# Patient Record
Sex: FEMALE | Race: BLACK OR AFRICAN AMERICAN | Employment: UNEMPLOYED | URBAN - METROPOLITAN AREA
[De-identification: names, ages, dates, MRNs, and addresses within clinical notes are randomized per-mention and may not be internally consistent; named-entity substitution may affect disease eponyms.]

---

## 2017-01-05 ENCOUNTER — TELEPHONE (OUTPATIENT)
Dept: PRIMARY CARE CLINIC | Age: 57
End: 2017-01-05

## 2017-02-03 ENCOUNTER — OFFICE VISIT (OUTPATIENT)
Dept: PRIMARY CARE CLINIC | Age: 57
End: 2017-02-03

## 2017-02-03 VITALS
DIASTOLIC BLOOD PRESSURE: 94 MMHG | OXYGEN SATURATION: 98 % | SYSTOLIC BLOOD PRESSURE: 156 MMHG | RESPIRATION RATE: 18 BRPM | HEART RATE: 78 BPM | TEMPERATURE: 98 F

## 2017-02-03 DIAGNOSIS — R04.0 EPISTAXIS: ICD-10-CM

## 2017-02-03 DIAGNOSIS — A04.8 H. PYLORI INFECTION: ICD-10-CM

## 2017-02-03 DIAGNOSIS — Z12.31 ENCOUNTER FOR SCREENING MAMMOGRAM FOR BREAST CANCER: ICD-10-CM

## 2017-02-03 DIAGNOSIS — I10 ESSENTIAL HYPERTENSION: Primary | ICD-10-CM

## 2017-02-03 LAB
A/G RATIO: 1.4 (ref 1.1–2.2)
ALBUMIN SERPL-MCNC: 4.6 G/DL (ref 3.4–5)
ALP BLD-CCNC: 86 U/L (ref 40–129)
ALT SERPL-CCNC: 10 U/L (ref 10–40)
ANION GAP SERPL CALCULATED.3IONS-SCNC: 12 MMOL/L (ref 3–16)
AST SERPL-CCNC: 13 U/L (ref 15–37)
BASOPHILS ABSOLUTE: 0.1 K/UL (ref 0–0.2)
BASOPHILS RELATIVE PERCENT: 2.4 %
BILIRUB SERPL-MCNC: 0.4 MG/DL (ref 0–1)
BUN BLDV-MCNC: 16 MG/DL (ref 7–20)
CALCIUM SERPL-MCNC: 9.6 MG/DL (ref 8.3–10.6)
CHLORIDE BLD-SCNC: 102 MMOL/L (ref 99–110)
CO2: 27 MMOL/L (ref 21–32)
CREAT SERPL-MCNC: 1 MG/DL (ref 0.6–1.1)
EOSINOPHILS ABSOLUTE: 0.2 K/UL (ref 0–0.6)
EOSINOPHILS RELATIVE PERCENT: 3 %
GFR AFRICAN AMERICAN: >60
GFR NON-AFRICAN AMERICAN: 58
GLOBULIN: 3.2 G/DL
GLUCOSE BLD-MCNC: 89 MG/DL (ref 70–99)
HCT VFR BLD CALC: 41 % (ref 36–48)
HEMOGLOBIN: 13.3 G/DL (ref 12–16)
LYMPHOCYTES ABSOLUTE: 1.9 K/UL (ref 1–5.1)
LYMPHOCYTES RELATIVE PERCENT: 32.4 %
MCH RBC QN AUTO: 28.1 PG (ref 26–34)
MCHC RBC AUTO-ENTMCNC: 32.4 G/DL (ref 31–36)
MCV RBC AUTO: 86.7 FL (ref 80–100)
MONOCYTES ABSOLUTE: 0.5 K/UL (ref 0–1.3)
MONOCYTES RELATIVE PERCENT: 9.3 %
NEUTROPHILS ABSOLUTE: 3 K/UL (ref 1.7–7.7)
NEUTROPHILS RELATIVE PERCENT: 52.9 %
PDW BLD-RTO: 14.1 % (ref 12.4–15.4)
PLATELET # BLD: 259 K/UL (ref 135–450)
PMV BLD AUTO: 9.3 FL (ref 5–10.5)
POTASSIUM SERPL-SCNC: 5 MMOL/L (ref 3.5–5.1)
RBC # BLD: 4.73 M/UL (ref 4–5.2)
SODIUM BLD-SCNC: 141 MMOL/L (ref 136–145)
TOTAL PROTEIN: 7.8 G/DL (ref 6.4–8.2)
TSH REFLEX FT4: 1.87 UIU/ML (ref 0.27–4.2)
WBC # BLD: 5.7 K/UL (ref 4–11)

## 2017-02-03 PROCEDURE — 99214 OFFICE O/P EST MOD 30 MIN: CPT | Performed by: INTERNAL MEDICINE

## 2017-02-03 RX ORDER — AMOXICILLIN 500 MG/1
1000 CAPSULE ORAL 2 TIMES DAILY
Qty: 40 CAPSULE | Refills: 0 | Status: SHIPPED | OUTPATIENT
Start: 2017-02-03 | End: 2017-02-13

## 2017-02-03 RX ORDER — CLARITHROMYCIN 500 MG/1
500 TABLET, COATED ORAL 2 TIMES DAILY
Qty: 20 TABLET | Refills: 0 | Status: SHIPPED | OUTPATIENT
Start: 2017-02-03 | End: 2017-02-13

## 2017-02-03 RX ORDER — OLMESARTAN MEDOXOMIL / AMLODIPINE BESYLATE / HYDROCHLOROTHIAZIDE 40; 10; 12.5 MG/1; MG/1; MG/1
1 TABLET, FILM COATED ORAL DAILY
Qty: 90 TABLET | Refills: 1 | Status: SHIPPED | OUTPATIENT
Start: 2017-02-03 | End: 2017-05-26 | Stop reason: SDUPTHER

## 2017-02-04 ASSESSMENT — ENCOUNTER SYMPTOMS
ABDOMINAL PAIN: 0
EYES NEGATIVE: 1
RESPIRATORY NEGATIVE: 1
SHORTNESS OF BREATH: 0
RECTAL PAIN: 0
DIARRHEA: 0
ABDOMINAL DISTENTION: 0
CONSTIPATION: 0
VOMITING: 0
NAUSEA: 0

## 2017-03-02 ENCOUNTER — OFFICE VISIT (OUTPATIENT)
Dept: PRIMARY CARE CLINIC | Age: 57
End: 2017-03-02

## 2017-03-02 VITALS
HEART RATE: 76 BPM | WEIGHT: 177.8 LBS | DIASTOLIC BLOOD PRESSURE: 82 MMHG | BODY MASS INDEX: 28.7 KG/M2 | OXYGEN SATURATION: 99 % | SYSTOLIC BLOOD PRESSURE: 120 MMHG

## 2017-03-02 DIAGNOSIS — R04.0 EPISTAXIS: ICD-10-CM

## 2017-03-02 DIAGNOSIS — B35.1 NAIL FUNGUS: ICD-10-CM

## 2017-03-02 DIAGNOSIS — I10 ESSENTIAL HYPERTENSION: ICD-10-CM

## 2017-03-02 DIAGNOSIS — K21.00 GASTROESOPHAGEAL REFLUX DISEASE WITH ESOPHAGITIS: Primary | ICD-10-CM

## 2017-03-02 PROCEDURE — 99213 OFFICE O/P EST LOW 20 MIN: CPT | Performed by: INTERNAL MEDICINE

## 2017-03-02 ASSESSMENT — ENCOUNTER SYMPTOMS
DIARRHEA: 0
ABDOMINAL PAIN: 0
RECTAL PAIN: 0
CONSTIPATION: 0
VOMITING: 0
RESPIRATORY NEGATIVE: 1
EYES NEGATIVE: 1
ABDOMINAL DISTENTION: 0
NAUSEA: 0
SHORTNESS OF BREATH: 0

## 2017-03-02 ASSESSMENT — PATIENT HEALTH QUESTIONNAIRE - PHQ9
1. LITTLE INTEREST OR PLEASURE IN DOING THINGS: 0
SUM OF ALL RESPONSES TO PHQ QUESTIONS 1-9: 0
SUM OF ALL RESPONSES TO PHQ9 QUESTIONS 1 & 2: 0
2. FEELING DOWN, DEPRESSED OR HOPELESS: 0

## 2017-03-17 ENCOUNTER — TELEPHONE (OUTPATIENT)
Dept: ENT CLINIC | Age: 57
End: 2017-03-17

## 2017-03-17 ENCOUNTER — OFFICE VISIT (OUTPATIENT)
Dept: PRIMARY CARE CLINIC | Age: 57
End: 2017-03-17

## 2017-03-17 ENCOUNTER — TELEPHONE (OUTPATIENT)
Dept: PRIMARY CARE CLINIC | Age: 57
End: 2017-03-17

## 2017-03-17 VITALS
HEIGHT: 66 IN | DIASTOLIC BLOOD PRESSURE: 79 MMHG | TEMPERATURE: 97.8 F | HEART RATE: 89 BPM | SYSTOLIC BLOOD PRESSURE: 125 MMHG | RESPIRATION RATE: 17 BRPM | WEIGHT: 180 LBS | BODY MASS INDEX: 28.93 KG/M2

## 2017-03-17 DIAGNOSIS — R04.0 BLEEDING NOSE: Primary | ICD-10-CM

## 2017-03-17 DIAGNOSIS — R51.9 SEVERE FRONTAL HEADACHES: ICD-10-CM

## 2017-03-17 PROCEDURE — 99213 OFFICE O/P EST LOW 20 MIN: CPT | Performed by: INTERNAL MEDICINE

## 2017-03-17 RX ORDER — AMOXICILLIN 500 MG/1
1 TABLET, FILM COATED ORAL 3 TIMES DAILY
Qty: 30 TABLET | Refills: 0 | Status: SHIPPED | OUTPATIENT
Start: 2017-03-17 | End: 2017-10-20

## 2017-03-17 ASSESSMENT — ENCOUNTER SYMPTOMS
ABDOMINAL PAIN: 0
CONSTIPATION: 0
EYES NEGATIVE: 1
NAUSEA: 0
RESPIRATORY NEGATIVE: 1
RECTAL PAIN: 0
DIARRHEA: 0
SHORTNESS OF BREATH: 0
VOMITING: 0
ABDOMINAL DISTENTION: 0

## 2017-03-17 ASSESSMENT — PATIENT HEALTH QUESTIONNAIRE - PHQ9
2. FEELING DOWN, DEPRESSED OR HOPELESS: 0
SUM OF ALL RESPONSES TO PHQ QUESTIONS 1-9: 0
1. LITTLE INTEREST OR PLEASURE IN DOING THINGS: 0
SUM OF ALL RESPONSES TO PHQ9 QUESTIONS 1 & 2: 0

## 2017-03-18 RX ORDER — ECHINACEA PURPUREA EXTRACT 125 MG
1 TABLET ORAL 4 TIMES DAILY
Qty: 1 BOTTLE | Refills: 3 | Status: SHIPPED | OUTPATIENT
Start: 2017-03-18 | End: 2017-10-20

## 2017-04-10 ENCOUNTER — TELEPHONE (OUTPATIENT)
Dept: PRIMARY CARE CLINIC | Age: 57
End: 2017-04-10

## 2017-05-16 ENCOUNTER — TELEPHONE (OUTPATIENT)
Dept: PRIMARY CARE CLINIC | Age: 57
End: 2017-05-16

## 2017-05-26 ENCOUNTER — OFFICE VISIT (OUTPATIENT)
Dept: PRIMARY CARE CLINIC | Age: 57
End: 2017-05-26

## 2017-05-26 VITALS
WEIGHT: 179 LBS | OXYGEN SATURATION: 96 % | HEART RATE: 85 BPM | SYSTOLIC BLOOD PRESSURE: 135 MMHG | RESPIRATION RATE: 18 BRPM | TEMPERATURE: 98.8 F | DIASTOLIC BLOOD PRESSURE: 82 MMHG | BODY MASS INDEX: 28.89 KG/M2

## 2017-05-26 DIAGNOSIS — Z12.11 COLON CANCER SCREENING: Primary | ICD-10-CM

## 2017-05-26 DIAGNOSIS — K27.9 PUD (PEPTIC ULCER DISEASE): ICD-10-CM

## 2017-05-26 DIAGNOSIS — Z11.59 NEED FOR HEPATITIS C SCREENING TEST: ICD-10-CM

## 2017-05-26 DIAGNOSIS — Z11.4 SCREENING FOR HIV (HUMAN IMMUNODEFICIENCY VIRUS): ICD-10-CM

## 2017-05-26 DIAGNOSIS — S33.5XXA LOW BACK SPRAIN, INITIAL ENCOUNTER: ICD-10-CM

## 2017-05-26 DIAGNOSIS — Z23 NEED FOR TDAP VACCINATION: ICD-10-CM

## 2017-05-26 DIAGNOSIS — E55.9 VITAMIN D DEFICIENCY: ICD-10-CM

## 2017-05-26 DIAGNOSIS — I10 ESSENTIAL HYPERTENSION: ICD-10-CM

## 2017-05-26 DIAGNOSIS — S80.01XD CONTUSION OF RIGHT KNEE, SUBSEQUENT ENCOUNTER: ICD-10-CM

## 2017-05-26 LAB
A/G RATIO: 1.4 (ref 1.1–2.2)
ALBUMIN SERPL-MCNC: 4.4 G/DL (ref 3.4–5)
ALP BLD-CCNC: 83 U/L (ref 40–129)
ALT SERPL-CCNC: 16 U/L (ref 10–40)
ANION GAP SERPL CALCULATED.3IONS-SCNC: 15 MMOL/L (ref 3–16)
AST SERPL-CCNC: 21 U/L (ref 15–37)
BASOPHILS ABSOLUTE: 0.1 K/UL (ref 0–0.2)
BASOPHILS RELATIVE PERCENT: 1.4 %
BILIRUB SERPL-MCNC: 0.5 MG/DL (ref 0–1)
BUN BLDV-MCNC: 15 MG/DL (ref 7–20)
CALCIUM SERPL-MCNC: 9.6 MG/DL (ref 8.3–10.6)
CHLORIDE BLD-SCNC: 102 MMOL/L (ref 99–110)
CO2: 24 MMOL/L (ref 21–32)
CREAT SERPL-MCNC: 0.9 MG/DL (ref 0.6–1.1)
EOSINOPHILS ABSOLUTE: 0.1 K/UL (ref 0–0.6)
EOSINOPHILS RELATIVE PERCENT: 2.4 %
GFR AFRICAN AMERICAN: >60
GFR NON-AFRICAN AMERICAN: >60
GLOBULIN: 3.2 G/DL
GLUCOSE BLD-MCNC: 97 MG/DL (ref 70–99)
HCT VFR BLD CALC: 42 % (ref 36–48)
HEMOGLOBIN: 13.2 G/DL (ref 12–16)
LYMPHOCYTES ABSOLUTE: 1.4 K/UL (ref 1–5.1)
LYMPHOCYTES RELATIVE PERCENT: 29.9 %
MCH RBC QN AUTO: 27.8 PG (ref 26–34)
MCHC RBC AUTO-ENTMCNC: 31.4 G/DL (ref 31–36)
MCV RBC AUTO: 88.5 FL (ref 80–100)
MONOCYTES ABSOLUTE: 0.5 K/UL (ref 0–1.3)
MONOCYTES RELATIVE PERCENT: 9.8 %
NEUTROPHILS ABSOLUTE: 2.6 K/UL (ref 1.7–7.7)
NEUTROPHILS RELATIVE PERCENT: 56.5 %
PDW BLD-RTO: 14.8 % (ref 12.4–15.4)
PLATELET # BLD: 298 K/UL (ref 135–450)
PMV BLD AUTO: 8.4 FL (ref 5–10.5)
POTASSIUM SERPL-SCNC: 4.7 MMOL/L (ref 3.5–5.1)
RBC # BLD: 4.74 M/UL (ref 4–5.2)
SODIUM BLD-SCNC: 141 MMOL/L (ref 136–145)
TOTAL PROTEIN: 7.6 G/DL (ref 6.4–8.2)
TSH REFLEX FT4: 1.36 UIU/ML (ref 0.27–4.2)
VITAMIN D 25-HYDROXY: 14.5 NG/ML
WBC # BLD: 4.6 K/UL (ref 4–11)

## 2017-05-26 PROCEDURE — 99213 OFFICE O/P EST LOW 20 MIN: CPT | Performed by: INTERNAL MEDICINE

## 2017-05-26 RX ORDER — IBUPROFEN 800 MG/1
800 TABLET ORAL
COMMUNITY
Start: 2017-05-25 | End: 2017-10-20

## 2017-05-26 RX ORDER — DEXLANSOPRAZOLE 60 MG/1
60 CAPSULE, DELAYED RELEASE ORAL DAILY
Qty: 30 CAPSULE | Refills: 2 | Status: SHIPPED | OUTPATIENT
Start: 2017-05-26 | End: 2017-10-20 | Stop reason: SDUPTHER

## 2017-05-26 RX ORDER — TRAMADOL HYDROCHLORIDE 50 MG/1
50 TABLET ORAL
COMMUNITY
Start: 2017-05-25 | End: 2017-10-20

## 2017-05-26 RX ORDER — OLMESARTAN MEDOXOMIL / AMLODIPINE BESYLATE / HYDROCHLOROTHIAZIDE 40; 10; 12.5 MG/1; MG/1; MG/1
1 TABLET, FILM COATED ORAL DAILY
Qty: 90 TABLET | Refills: 1 | Status: SHIPPED | OUTPATIENT
Start: 2017-05-26 | End: 2017-10-20

## 2017-05-26 RX ORDER — NEBIVOLOL 10 MG/1
10 TABLET ORAL DAILY
Qty: 30 TABLET | Refills: 5 | Status: SHIPPED | OUTPATIENT
Start: 2017-05-26 | End: 2017-10-20 | Stop reason: SDUPTHER

## 2017-05-26 ASSESSMENT — ENCOUNTER SYMPTOMS
VOMITING: 0
BOWEL INCONTINENCE: 0
ABDOMINAL DISTENTION: 0
RECTAL PAIN: 0
DIARRHEA: 0
ABDOMINAL PAIN: 0
BACK PAIN: 1
VISUAL CHANGE: 0
RESPIRATORY NEGATIVE: 1
SHORTNESS OF BREATH: 0
NAUSEA: 0
EYES NEGATIVE: 1
CONSTIPATION: 0

## 2017-05-27 DIAGNOSIS — E55.9 VITAMIN D DEFICIENCY: Primary | ICD-10-CM

## 2017-05-27 LAB — HEPATITIS C ANTIBODY INTERPRETATION: NORMAL

## 2017-05-29 LAB — HIV-1 AND HIV-2 ANTIBODIES: NEGATIVE

## 2017-06-08 ENCOUNTER — TELEPHONE (OUTPATIENT)
Dept: PRIMARY CARE CLINIC | Age: 57
End: 2017-06-08

## 2017-06-29 ENCOUNTER — TELEPHONE (OUTPATIENT)
Dept: PRIMARY CARE CLINIC | Age: 57
End: 2017-06-29

## 2017-10-20 ENCOUNTER — OFFICE VISIT (OUTPATIENT)
Dept: PRIMARY CARE CLINIC | Age: 57
End: 2017-10-20

## 2017-10-20 VITALS
HEART RATE: 70 BPM | DIASTOLIC BLOOD PRESSURE: 78 MMHG | BODY MASS INDEX: 26.07 KG/M2 | OXYGEN SATURATION: 100 % | TEMPERATURE: 98 F | RESPIRATION RATE: 18 BRPM | WEIGHT: 172 LBS | SYSTOLIC BLOOD PRESSURE: 120 MMHG | HEIGHT: 68 IN

## 2017-10-20 DIAGNOSIS — I10 ESSENTIAL HYPERTENSION: ICD-10-CM

## 2017-10-20 DIAGNOSIS — K27.9 PUD (PEPTIC ULCER DISEASE): ICD-10-CM

## 2017-10-20 DIAGNOSIS — E55.9 VITAMIN D DEFICIENCY: ICD-10-CM

## 2017-10-20 DIAGNOSIS — K21.00 GASTROESOPHAGEAL REFLUX DISEASE WITH ESOPHAGITIS: Primary | ICD-10-CM

## 2017-10-20 PROCEDURE — 99213 OFFICE O/P EST LOW 20 MIN: CPT | Performed by: INTERNAL MEDICINE

## 2017-10-20 RX ORDER — OLMESARTAN MEDOXOMIL AND HYDROCHLOROTHIAZIDE 20/12.5 20; 12.5 MG/1; MG/1
1 TABLET ORAL DAILY
Qty: 30 TABLET | Refills: 3 | Status: SHIPPED | OUTPATIENT
Start: 2017-10-20 | End: 2018-06-06 | Stop reason: SDUPTHER

## 2017-10-20 RX ORDER — NEBIVOLOL 5 MG/1
5 TABLET ORAL DAILY
Qty: 30 TABLET | Refills: 5 | Status: SHIPPED | OUTPATIENT
Start: 2017-10-20 | End: 2018-06-06 | Stop reason: SDUPTHER

## 2017-10-20 RX ORDER — DEXLANSOPRAZOLE 60 MG/1
60 CAPSULE, DELAYED RELEASE ORAL DAILY
Qty: 30 CAPSULE | Refills: 5 | Status: SHIPPED | OUTPATIENT
Start: 2017-10-20 | End: 2018-08-24 | Stop reason: SDUPTHER

## 2017-10-20 RX ORDER — ONDANSETRON 4 MG/1
4 TABLET, FILM COATED ORAL
COMMUNITY
Start: 2017-10-11 | End: 2017-10-20

## 2017-10-20 NOTE — PROGRESS NOTES
Subjective:      Patient ID: Zoey Fink is a 56 y.o. female. HPI  Patient presents for evaluation of the followin. Gastroesophageal reflux disease with esophagitis  and     PUD (peptic ulcer disease) history. Had been out of Dexalant for over a month and symptoms of epigastric burning daily returned. No melena or hematocheizia. 3. Essential hypertension Bllod pressure is controlled but has not had medication for a few days. She has has some episodes of dizziness with standing. Will need to reduce dosage of medication. BP Readings from Last 3 Encounters:   10/20/17 120/78   10/10/17 137/87   17 135/82       No associated CHF or renal disease. No RANKIN or shortness of breath. 4. Vitamin D deficiency not taking supplement. Will restart and monitor level in three months. Current Outpatient Prescriptions on File Prior to Visit   Medication Sig Dispense Refill    Cholecalciferol (VITAMIN D3) 5000 UNITS TBDP Take 1 each by mouth daily 30 tablet 5    cetirizine (ZYRTEC) 10 MG tablet Take 1 tablet by mouth daily 30 tablet 2    Blood Pressure Monitoring (BLOOD PRESSURE CUFF) MISC by Does not apply route. 1 each 0     No current facility-administered medications on file prior to visit. Patient Active Problem List   Diagnosis    Nail fungus    Menopause syndrome    Essential hypertension    Pre-ulcerative corn or callous    Microscopic hematuria    Gastroesophageal reflux disease with esophagitis     No Known Allergies    Review of Systems   Constitutional: Negative. Negative for fever. HENT: Negative for nosebleeds. Patient reports use over-the-counter nasal spray and has not had any other nosebleeds. Eyes: Negative. Respiratory: Negative. Negative for shortness of breath. Cardiovascular: Negative. Negative for chest pain and palpitations. Hypertension.    Gastrointestinal: Negative for abdominal distention, abdominal pain, constipation, diarrhea, nausea, rectal pain and vomiting. GERD symptoms resolved on Dexilant. Genitourinary: Negative. Negative for dysuria and hematuria. Musculoskeletal: Positive for back pain. Negative for arthralgias, myalgias and neck pain. Skin: Negative. Neurological: Negative. Negative for numbness and headaches. Psychiatric/Behavioral: Negative. Objective:   Physical Exam   Constitutional: She is oriented to person, place, and time. She appears well-developed and well-nourished. No distress. HENT:   Head: Normocephalic and atraumatic. Right Ear: External ear normal.   Left Ear: External ear normal.   Nose: Nose normal.   Mouth/Throat: Oropharynx is clear and moist. No oropharyngeal exudate. Eyes: Conjunctivae and EOM are normal. Pupils are equal, round, and reactive to light. Right eye exhibits no discharge. Left eye exhibits no discharge. No scleral icterus. Neck: Normal range of motion. Neck supple. No JVD present. No tracheal deviation present. No thyromegaly present. Cardiovascular: Normal rate, regular rhythm, normal heart sounds and intact distal pulses. Pulmonary/Chest: Effort normal and breath sounds normal. No respiratory distress. She has no wheezes. She has no rales. She exhibits no tenderness. Breast exam without masses. Abdominal: Soft. Bowel sounds are normal. She exhibits no distension and no mass. There is no tenderness. There is no rebound and no guarding. Musculoskeletal: She exhibits no edema or tenderness. Mild swelling and crepitus of right knee. Lymphadenopathy:     She has no cervical adenopathy. Neurological: She is alert and oriented to person, place, and time. She has normal reflexes. No cranial nerve deficit. She exhibits normal muscle tone. Coordination normal.   Skin: Skin is warm and dry. No rash noted. She is not diaphoretic. No erythema. No pallor. Psychiatric: She has a normal mood and affect.  Her behavior is normal. Judgment Patient is taking over the counter meds and discussed as to how they interact with prescription medications.

## 2017-10-29 ASSESSMENT — ENCOUNTER SYMPTOMS
ABDOMINAL DISTENTION: 0
CONSTIPATION: 0
VOMITING: 0
BACK PAIN: 1
SHORTNESS OF BREATH: 0
ABDOMINAL PAIN: 0
NAUSEA: 0
EYES NEGATIVE: 1
DIARRHEA: 0
RESPIRATORY NEGATIVE: 1
RECTAL PAIN: 0

## 2017-11-03 ENCOUNTER — TELEPHONE (OUTPATIENT)
Dept: PRIMARY CARE CLINIC | Age: 57
End: 2017-11-03

## 2017-11-09 ENCOUNTER — OFFICE VISIT (OUTPATIENT)
Dept: PRIMARY CARE CLINIC | Age: 57
End: 2017-11-09

## 2017-11-09 VITALS
HEIGHT: 68 IN | DIASTOLIC BLOOD PRESSURE: 88 MMHG | TEMPERATURE: 98.2 F | WEIGHT: 171.6 LBS | HEART RATE: 77 BPM | OXYGEN SATURATION: 97 % | SYSTOLIC BLOOD PRESSURE: 136 MMHG | BODY MASS INDEX: 26.01 KG/M2

## 2017-11-09 DIAGNOSIS — M17.11 ARTHRITIS OF RIGHT KNEE: ICD-10-CM

## 2017-11-09 DIAGNOSIS — M54.9 MUSCULOSKELETAL BACK PAIN: Primary | ICD-10-CM

## 2017-11-09 PROCEDURE — 99213 OFFICE O/P EST LOW 20 MIN: CPT | Performed by: INTERNAL MEDICINE

## 2017-11-09 RX ORDER — BACLOFEN 10 MG/1
10 TABLET ORAL 2 TIMES DAILY
Qty: 60 TABLET | Refills: 5 | Status: SHIPPED | OUTPATIENT
Start: 2017-11-09 | End: 2017-11-20 | Stop reason: SDUPTHER

## 2017-11-09 NOTE — PROGRESS NOTES
Subjective:      Patient ID: Michelle Barajas is a 56 y.o. female. HPI  Patient presents for evaluation of followin. Musculoskeletal back pain bilateral lower back. No radiation into the legs. Complain of back muscle spasm spasm noted on exam.  Pain is a 5 out of 10 with flexion and extension standing for more than 15 minutes. No complaint of fecal incontinence but does have urinary urge incontinence intermittently. Pain was initially radiating to the leg but no longer. Pain is improved 50% and patient feels she is able to return to work on the 15 as a . States that she can. Patient has been going to physical therapy. External Referral To Physical Therapy    diclofenac (VOLTAREN) 50 MG EC tablet    External Referral To Orthopedic Surgery   2. Arthritis of right knee with swelling and pain with ambulation. Sharp shooting aching pain and relieved with sitting down and nonweightbearing activities. Arthritis pain for the past several months. External Referral To Orthopedic Surgery     Current Outpatient Prescriptions on File Prior to Visit   Medication Sig Dispense Refill    dexlansoprazole (DEXILANT) 60 MG CPDR delayed release capsule Take 1 capsule by mouth daily Discontinue omeprazole 30 capsule 5    nebivolol (BYSTOLIC) 5 MG tablet Take 1 tablet by mouth daily 30 tablet 5    olmesartan-hydrochlorothiazide (BENICAR HCT) 20-12.5 MG per tablet Take 1 tablet by mouth daily Stop amlodipine/olmesartan/hactz 30 tablet 3    Cholecalciferol (VITAMIN D3) 5000 UNITS TBDP Take 1 each by mouth daily 30 tablet 5    cetirizine (ZYRTEC) 10 MG tablet Take 1 tablet by mouth daily 30 tablet 2    Blood Pressure Monitoring (BLOOD PRESSURE CUFF) MISC by Does not apply route. 1 each 0     No current facility-administered medications on file prior to visit.       No Known Allergies  Patient Active Problem List   Diagnosis    Nail fungus    Menopause syndrome    Essential hypertension    Pre-ulcerative corn or callous    Microscopic hematuria    Gastroesophageal reflux disease with esophagitis       Review of Systems   Constitutional: Negative. Negative for fever. HENT: Negative for nosebleeds. Patient reports use over-the-counter nasal spray and has not had any other nosebleeds. Eyes: Negative. Respiratory: Negative. Negative for shortness of breath. Cardiovascular: Negative. Negative for chest pain and palpitations. Hypertension. Gastrointestinal: Negative for abdominal distention, abdominal pain, constipation, diarrhea, nausea, rectal pain and vomiting. GERD symptoms resolved on Dexilant. Genitourinary: Negative. Negative for dysuria and hematuria. Musculoskeletal: Positive for back pain. Negative for arthralgias, myalgias and neck pain. Skin: Negative. Neurological: Negative. Negative for numbness and headaches. Psychiatric/Behavioral: Negative. Objective:   Physical Exam   Constitutional: She is oriented to person, place, and time. She appears well-developed and well-nourished. No distress. HENT:   Head: Normocephalic and atraumatic. Right Ear: External ear normal.   Left Ear: External ear normal.   Nose: Nose normal.   Mouth/Throat: Oropharynx is clear and moist. No oropharyngeal exudate. Eyes: Conjunctivae and EOM are normal. Pupils are equal, round, and reactive to light. Right eye exhibits no discharge. Left eye exhibits no discharge. No scleral icterus. Neck: Normal range of motion. Neck supple. No JVD present. No tracheal deviation present. No thyromegaly present. Cardiovascular: Normal rate, regular rhythm, normal heart sounds and intact distal pulses. Pulmonary/Chest: Effort normal and breath sounds normal. No respiratory distress. She has no wheezes. She has no rales. She exhibits no tenderness. Breast exam without masses. Abdominal: Soft. Bowel sounds are normal. She exhibits no distension and no mass.  There is no

## 2017-11-09 NOTE — LETTER
[address]  [address]  Phone: [phone]  Fax: [phone]    Deena Herman MD        November 9, 2017     Patient: Autumn Schultz   YOB: 1960   Date of Visit: 11/9/2017       To Whom It May Concern: It is my medical opinion that Katharine Barrientos was brought to office today for a sick visit by her  Veena Maza. Mr. Veena Maza will be late to work today . [place] If you have any questions or concerns, please don't hesitate to call.     Sincerely,        Deena Herman MD

## 2017-11-10 ASSESSMENT — ENCOUNTER SYMPTOMS
RESPIRATORY NEGATIVE: 1
DIARRHEA: 0
NAUSEA: 0
BACK PAIN: 1
RECTAL PAIN: 0
ABDOMINAL DISTENTION: 0
EYES NEGATIVE: 1
VOMITING: 0
ABDOMINAL PAIN: 0
CONSTIPATION: 0
SHORTNESS OF BREATH: 0

## 2017-11-20 ENCOUNTER — OFFICE VISIT (OUTPATIENT)
Dept: PRIMARY CARE CLINIC | Age: 57
End: 2017-11-20

## 2017-11-20 VITALS
WEIGHT: 170 LBS | TEMPERATURE: 98 F | BODY MASS INDEX: 25.85 KG/M2 | SYSTOLIC BLOOD PRESSURE: 130 MMHG | RESPIRATION RATE: 18 BRPM | OXYGEN SATURATION: 97 % | DIASTOLIC BLOOD PRESSURE: 80 MMHG | HEART RATE: 78 BPM

## 2017-11-20 DIAGNOSIS — M54.9 MUSCULOSKELETAL BACK PAIN: ICD-10-CM

## 2017-11-20 DIAGNOSIS — M25.561 RIGHT KNEE PAIN, UNSPECIFIED CHRONICITY: Primary | ICD-10-CM

## 2017-11-20 PROCEDURE — 99213 OFFICE O/P EST LOW 20 MIN: CPT | Performed by: INTERNAL MEDICINE

## 2017-11-20 RX ORDER — BACLOFEN 10 MG/1
10 TABLET ORAL 3 TIMES DAILY
Qty: 90 TABLET | Refills: 5 | Status: SHIPPED | OUTPATIENT
Start: 2017-11-20 | End: 2018-03-12

## 2017-11-20 ASSESSMENT — ENCOUNTER SYMPTOMS
NAUSEA: 0
ABDOMINAL DISTENTION: 0
ABDOMINAL PAIN: 0
VOMITING: 0
RESPIRATORY NEGATIVE: 1
EYES NEGATIVE: 1
DIARRHEA: 0
RECTAL PAIN: 0
BACK PAIN: 1
CONSTIPATION: 0
SHORTNESS OF BREATH: 0

## 2017-11-27 ENCOUNTER — TELEPHONE (OUTPATIENT)
Dept: PRIMARY CARE CLINIC | Age: 57
End: 2017-11-27

## 2017-11-27 NOTE — TELEPHONE ENCOUNTER
Pt has an open referral to see the orthopedic surgeon, Dr. Nathaneil Phoenix but the patient would like a referral to see an orthopedic surgeon at [facility] for her R knee. Pt would like to get this completed ASAP. Please advise. Thanks!   Pt's cb#: [phone]

## 2017-12-06 ENCOUNTER — TELEPHONE (OUTPATIENT)
Dept: PRIMARY CARE CLINIC | Age: 57
End: 2017-12-06

## 2017-12-14 ENCOUNTER — OFFICE VISIT (OUTPATIENT)
Dept: ORTHOPEDIC SURGERY | Age: 57
End: 2017-12-14

## 2017-12-14 VITALS
TEMPERATURE: 98 F | WEIGHT: 170 LBS | BODY MASS INDEX: 25.76 KG/M2 | SYSTOLIC BLOOD PRESSURE: 124 MMHG | RESPIRATION RATE: 16 BRPM | HEIGHT: 68 IN | DIASTOLIC BLOOD PRESSURE: 76 MMHG | HEART RATE: 67 BPM

## 2017-12-14 DIAGNOSIS — G89.29 CHRONIC PAIN OF RIGHT KNEE: Primary | ICD-10-CM

## 2017-12-14 DIAGNOSIS — M25.561 CHRONIC PAIN OF RIGHT KNEE: Primary | ICD-10-CM

## 2017-12-14 DIAGNOSIS — M17.11 PRIMARY LOCALIZED OSTEOARTHROSIS OF RIGHT LOWER LEG: ICD-10-CM

## 2017-12-14 PROCEDURE — 99203 OFFICE O/P NEW LOW 30 MIN: CPT | Performed by: PHYSICIAN ASSISTANT

## 2017-12-14 PROCEDURE — 20610 DRAIN/INJ JOINT/BURSA W/O US: CPT | Performed by: PHYSICIAN ASSISTANT

## 2017-12-14 RX ORDER — NAPROXEN 500 MG/1
500 TABLET ORAL 2 TIMES DAILY WITH MEALS
Qty: 60 TABLET | Refills: 3 | Status: CANCELLED | OUTPATIENT
Start: 2017-12-14

## 2017-12-14 NOTE — PATIENT INSTRUCTIONS
Patient Education   Patient Education          cortisone  Pronunciation:  DOMENICA landis  Brand:  Cortone Acetate  What is the most important information I should know about cortisone? You should not use this medication if you are allergic to cortisone, or if you have a fungal infection anywhere in your body. Before taking cortisone, tell your doctor about all of your medical conditions, and about all other medicines you are using. There are many other diseases that can be affected by steroid use, and many other medicines that can interact with steroids. Your dosage needs may change if you have surgery, are ill, are under stress, or have a fever or infection. Do not change your medication dose or schedule without your doctor's advice. Tell your doctor about any illness or infection you have had within the past several weeks. Avoid being near people who are sick or have infections. Call your doctor for preventive treatment if you are exposed to chicken pox or measles. These conditions can be serious or even fatal in people who are using a steroid. Do not receive a \"live\" vaccine while using cortisone. The vaccine may not work as well during this time, and may not fully protect you from disease. Do not stop using cortisone suddenly, or you could have unpleasant withdrawal symptoms. Talk to your doctor about how to avoid withdrawal symptoms when stopping the medication. Wear a medical alert tag or carry an ID card stating that you take cortisone. Any medical care provider who treats you should know that you take steroid medication. What is cortisone? Cortisone is a steroid that prevents the release of substances in the body that cause inflammation. Cortisone is used to treat many different conditions such as allergic disorders, skin conditions, ulcerative colitis, arthritis, lupus, psoriasis, or breathing disorders. Cortisone may also be used for purposes not listed in this medication guide.   What should I or infection you have had within the past several weeks. This medication can cause unusual results with certain medical tests. Tell any doctor who treats you that you are using cortisone. Do not stop using cortisone suddenly, or you could have unpleasant withdrawal symptoms. Talk to your doctor about how to avoid withdrawal symptoms when stopping the medication. Wear a medical alert tag or carry an ID card stating that you take cortisone. Any medical care provider who treats you should know that you take steroid medication. Store at room temperature away from moisture, heat, and light. What happens if I miss a dose? Take the missed dose as soon as you remember. Skip the missed dose if it is almost time for your next scheduled dose. Do not take extra medicine to make up the missed dose. What happens if I overdose? Seek emergency medical attention or call the [facility] Help line at [phone]. An overdose of cortisone is not expected to produce life threatening symptoms. However, long term use of high steroid doses can lead to symptoms such as thinning skin, easy bruising, changes in the shape or location of body fat (especially in your face, neck, back, and waist), increased acne or facial hair, menstrual problems, impotence, or loss of interest in sex. What should I avoid while taking cortisone? Avoid being near people who are sick or have infections. Call your doctor for preventive treatment if you are exposed to chicken pox or measles. These conditions can be serious or even fatal in people who are using a steroid. Do not receive a \"live\" vaccine while using cortisone. The vaccine may not work as well during this time, and may not fully protect you from disease. Live vaccines include measles, mumps, rubella (MMR), Bacillus Calmette-Guérin (BCG), oral polio, rotavirus, smallpox, typhoid, yellow fever, varicella (chickenpox), H1N1 influenza, and nasal flu vaccine.   Avoid drinking alcohol while you are Rifamate, Rimactane); or  · seizure medications such as phenytoin (Dilantin) or phenobarbital (Luminal, Solfoton). This list is not complete and there are many other drugs that can interact with cortisone. Tell your doctor about all medications you use. This includes prescription, over-the-counter, vitamin, and herbal products. Do not start a new medication without telling your doctor. Keep a list of all your medicines and show it to any healthcare provider who treats you. Where can I get more information? Your pharmacist can provide more information about cortisone. Remember, keep this and all other medicines out of the reach of children, never share your medicines with others, and use this medication only for the indication prescribed. Every effort has been made to ensure that the information provided by [address] is accurate, up-to-date, and complete, but no guarantee is made to that effect. Drug information contained herein may be time sensitive. [facility] information has been compiled for use by healthcare practitioners and consumers in the [place] and therefore [facility] does not warrant that uses outside of the [place] are appropriate, unless specifically indicated otherwise. [facility]'s drug information does not endorse drugs, diagnose patients or recommend therapy. [facility] drug information is an informational resource designed to assist licensed healthcare practitioners in caring for their patients and/or to serve consumers viewing this service as a supplement to, and not a substitute for, the expertise, skill, knowledge and judgment of healthcare practitioners. The absence of a warning for a given drug or drug combination in no way should be construed to indicate that the drug or drug combination is safe, effective or appropriate for any given patient.  [facility] does not assume any responsibility for any aspect of healthcare administered with the aid of information [facility] provides. The information contained herein is not intended to cover all possible uses, directions, precautions, warnings, drug interactions, allergic reactions, or adverse effects. If you have questions about the drugs you are taking, check with your doctor, nurse or pharmacist.  Copyright 9830-8947 [address] [place] [address] Avenue: 7.01. Revision date: 6/7/2011. Care instructions adapted under license by [facility] ([facility]). If you have questions about a medical condition or this instruction, always ask your healthcare professional. [place] [address] any warranty or liability for your use of this information. Meniscus Tear: Care Instructions  Your Care Instructions    The meniscus is rubbery tissue in the knee that acts as a shock absorber between the upper and lower leg bones. The meniscus also keeps your knee stable by spreading weight across it. Each knee has two menisci (plural of meniscus). You can tear a meniscus if you plant your foot and twist, or pivot. The meniscus also can wear down as you age, and it can tear from squatting or kneeling. Small tears may heal on their own with rest and some physical therapy. But a more serious tear may need surgery to repair it or to remove part of the meniscus. Your doctor may want you to see a doctor who specializes in bones and sports injuries. Follow-up care is a key part of your treatment and safety. Be sure to make and go to all appointments, and call your doctor if you are having problems. It's also a good idea to know your test results and keep a list of the medicines you take. How can you care for yourself at home? · Rest your knee when possible. · Do not squat or kneel. · Take pain medicines exactly as directed. ¨ If the doctor gave you a prescription medicine for pain, take it as prescribed. ¨ If you are not taking a prescription pain medicine, ask your doctor if you can take an over-the-counter medicine.   · Put ice or a cold pack on your knee for 10 to 20 minutes at a time. Try to do this every 1 to 2 hours for the next 3 days (when you are awake) or until the swelling goes down. Put a thin cloth between the ice and your skin. · Prop up the sore leg on a pillow when you ice your knee or any time you sit or lie down during the next 3 days. Try to keep your leg above the level of your heart. This will help reduce swelling. · Follow your doctor's directions for using crutches or a knee brace, if suggested. · Follow your doctor's directions for exercises to keep your knee mobile and your leg muscles strong. Here are a few exercises you can try if your doctor says it is okay. ¨ Quad sets: Lie down on the floor or the bed with your injured leg straight. Fully extend your leg-there should be no or little bend in your knee. Tighten the thigh (quadriceps) of your injured leg for 6 seconds. Do not lift your heel up. Relax your quadriceps for 10 seconds. Repeat this exercise 8 to 12 times several times during the day. ¨ Straight-leg raises: Lie down on the floor or the bed with your injured leg flat and your uninjured leg bent so that the bottom of your foot is on the floor or bed. Tighten the quadriceps of your injured leg. Keeping your knee as straight as possible, lift your injured leg off the bed until it is about 18 inches above the bed or floor. Lower your leg back down and relax for 5 seconds. Do 3 sets of 20 repetitions, or if you tire quickly, 3 sets of 8 to 12 repetitions. ¨ Heel raises: Stand with your feet a few inches apart. Rest your hands lightly on a counter or chair in front of you. Slowly raise your heels off the floor while keeping your knees straight. Hold for 3 seconds, then slowly lower your heels to the floor. Do 3 sets of 8 to 12 repetitions. ¨ Heel slides: Lie down on the floor or the bed with your leg flat. Slowly begin to slide your heel toward your rear end (buttocks), keeping your heel on the floor.  Your knee will begin to bend. Slide your heel and bend your knee until it becomes a little sore and you can feel a small amount of pressure inside your knee. Hold this position for 10 seconds. Slide your heel back down until your leg is straight on the floor. Relax for 10 seconds. Repeat this exercise 20 times. When should you call for help? Watch closely for changes in your health, and be sure to contact your doctor if:  ? · You have increasing knee pain or swelling or both. ? · Your knee is so sore or stiff that you cannot walk on it. ? · You do not get better as expected. Where can you learn more? Go to https://[employer]lerte[employer].skedge.me. org and sign in to your [employer] account. Enter Q279 in the [facility] box to learn more about \"Meniscus Tear: Care Instructions. \"     If you do not have an account, please click on the \"Sign Up Now\" link. Current as of: March 21, 2017  Content Version: 11.4  © 7431-0819 [employer]. Care instructions adapted under license by [facility] ([facility]). If you have questions about a medical condition or this instruction, always ask your healthcare professional. [place] [address] any warranty or liability for your use of this information.

## 2017-12-14 NOTE — PROGRESS NOTES
This dictation was done with [employer] dictation and may contain mechanical errors related to translation. Subjective:  Shyam Bernstein is a 56 y.o. who is here complaining of pain in her right knee. She was referred here by Dr. Jose Clark. She works in [employer] up at [employer] and hasn't been able to work for at least a month, mainly due to her back and knee. Combination of pain, swelling, restriction of motion. She comes here for evaluation of her knees. I sent her for x-rays, including standing AP and lateral. [place] view      Patient Active Problem List   Diagnosis    Nail fungus    Menopause syndrome    Essential hypertension    Pre-ulcerative corn or callous    Microscopic hematuria    Gastroesophageal reflux disease with esophagitis           Current Outpatient Prescriptions on File Prior to Visit   Medication Sig Dispense Refill    diclofenac (VOLTAREN) 50 MG EC tablet Take 1 tablet by mouth Daily with supper 30 tablet 0    baclofen (LIORESAL) 10 MG tablet Take 1 tablet by mouth 3 times daily 90 tablet 5    dexlansoprazole (DEXILANT) 60 MG CPDR delayed release capsule Take 1 capsule by mouth daily Discontinue omeprazole 30 capsule 5    nebivolol (BYSTOLIC) 5 MG tablet Take 1 tablet by mouth daily 30 tablet 5    olmesartan-hydrochlorothiazide (BENICAR HCT) 20-12.5 MG per tablet Take 1 tablet by mouth daily Stop amlodipine/olmesartan/hactz 30 tablet 3    Cholecalciferol (VITAMIN D3) 5000 UNITS TBDP Take 1 each by mouth daily 30 tablet 5    cetirizine (ZYRTEC) 10 MG tablet Take 1 tablet by mouth daily 30 tablet 2    Blood Pressure Monitoring (BLOOD PRESSURE CUFF) MISC by Does not apply route. 1 each 0     No current facility-administered medications on file prior to visit. Objective:   Blood pressure 124/76, pulse 67, temperature 98 °F (36.7 °C), temperature source Temporal, resp. rate 16, height 5' 8\" (1.727 m), weight 170 lb (77.1 kg), not currently breastfeeding.     On examination, is a very pleasant 61-year-old female stress. She is alert and oriented ×3. She has a decreased valgus deformity on the right compared to the left at approximate 16° Q angle. She is a negative for Lockman negative for varus valgus laxity but she does have a mildly positive Edin with crepitus during flexion and extension through the patellofemoral joint. Her distal pulses are intact with good dorsiflexion and plantar flexion strength. She is negative for straight leg raise. Has some tightness to her hamstrings and piriformis muscle, but decent quad tone good hip flexion and abduction strength. Good range of motion of 0-140° bilaterally  Neuro exam grossly intact both lower extremities. Intact sensation to light touch. Motor exam 4+ to 5/5 in all major motor groups. Negative Bazan's sign. Skin is warm, dry and intact with out erythema or significant increased temperature around the knee joint(s). There are no cutaneous lesions or lymphadenopathy present. X-RAYS:  X-rays taken the office today show subchondral sclerosis and joint space narrowing osteoarthritis in all 3 compartments. There is no fractures or significant bone abnormalities are noted. There is a slight valgus alignment seen in this right knee      Assessment:  Right knee osteoarthritis/medial meniscus injury    Plan:  During today's visit, there was approximately 30 minutes of face-to-face discussion in regards to the patient's current condition and treatment options. More than 50 % of the time was counseling and coordination of care. We talked about short and long-term options for treatment. She will need physical therapy. I do not think she is capable of working full retirement duties. Right now she states that there is no light duty available. Therefore, with her consent she was injected with cortisone.  I will get her into formal physical therapy and see her back in 2-3 weeks hoping to return her to work early next year      PROCEDURE NOTE:  After a discussion of the multiple options, they consented to a cortisone shot. 1 ml of 40mg/ml Kenalog and 2 ml's of 0.25%Marcaine were injected into the right knee joint space. The leg was slightly flexed and injected just lateral to the patella tendon to under the patella. This was done with sterile technique and they tolerated it well.           They will schedule a follow up in 6-8 weeks

## 2017-12-14 NOTE — LETTER
[facility]  [address]  [address] [facility] [address]  Phone: [phone]  Fax: [phone]    Theron Meier MD        December 14, 2017     Patient: Autumn Schultz   YOB: 1960   Date of Visit: 12/14/2017       To Whom It May Concern: It is my medical opinion that Katharine Barrientos may return to work on [employer] 4th 2017. If you have any questions or concerns, please don't hesitate to call.     Sincerely,        Theron Meier MD

## 2018-01-09 ENCOUNTER — TELEPHONE (OUTPATIENT)
Dept: ORTHOPEDIC SURGERY | Age: 58
End: 2018-01-09

## 2018-01-10 ENCOUNTER — OFFICE VISIT (OUTPATIENT)
Dept: PRIMARY CARE CLINIC | Age: 58
End: 2018-01-10

## 2018-01-10 VITALS
BODY MASS INDEX: 26 KG/M2 | RESPIRATION RATE: 18 BRPM | OXYGEN SATURATION: 100 % | TEMPERATURE: 98.6 F | WEIGHT: 171 LBS | SYSTOLIC BLOOD PRESSURE: 109 MMHG | DIASTOLIC BLOOD PRESSURE: 69 MMHG | HEART RATE: 70 BPM

## 2018-01-10 DIAGNOSIS — M54.31 SCIATICA, RIGHT SIDE: ICD-10-CM

## 2018-01-10 DIAGNOSIS — M54.9 MUSCULOSKELETAL BACK PAIN: ICD-10-CM

## 2018-01-10 DIAGNOSIS — M17.11 PRIMARY OSTEOARTHRITIS OF RIGHT KNEE: Primary | ICD-10-CM

## 2018-01-10 DIAGNOSIS — S83.241S ACUTE MEDIAL MENISCUS TEAR, RIGHT, SEQUELA: ICD-10-CM

## 2018-01-10 PROCEDURE — 99213 OFFICE O/P EST LOW 20 MIN: CPT | Performed by: INTERNAL MEDICINE

## 2018-01-11 ENCOUNTER — OFFICE VISIT (OUTPATIENT)
Dept: ORTHOPEDIC SURGERY | Age: 58
End: 2018-01-11

## 2018-01-11 VITALS
DIASTOLIC BLOOD PRESSURE: 74 MMHG | WEIGHT: 170 LBS | SYSTOLIC BLOOD PRESSURE: 126 MMHG | BODY MASS INDEX: 25.76 KG/M2 | HEART RATE: 81 BPM | TEMPERATURE: 97.8 F | HEIGHT: 68 IN

## 2018-01-11 DIAGNOSIS — M17.11 PRIMARY LOCALIZED OSTEOARTHROSIS OF RIGHT LOWER LEG: Primary | ICD-10-CM

## 2018-01-11 DIAGNOSIS — S83.241A ACUTE MEDIAL MENISCAL TEAR, RIGHT, INITIAL ENCOUNTER: ICD-10-CM

## 2018-01-11 PROCEDURE — 99213 OFFICE O/P EST LOW 20 MIN: CPT | Performed by: PHYSICIAN ASSISTANT

## 2018-01-12 ENCOUNTER — TELEPHONE (OUTPATIENT)
Dept: ORTHOPEDIC SURGERY | Age: 58
End: 2018-01-12

## 2018-01-12 NOTE — TELEPHONE ENCOUNTER
Pt having MRI R knee on 1-16-18 at 11:00am at [facility].    Pt informed/order faxed/f/u appt made

## 2018-01-12 NOTE — PROGRESS NOTES
Subjective:      Patient ID: Karina Garcia is a 56 y.o.  female. Chief Complaint   Patient presents with    Knee Pain     Right knee        HPI: She is here for follow up on right knee(s). She states symptoms had improved 50% with the recent cortisone injection performed on 17. Pain is on average 6/10. Pain is worse with increased activity/ weight bearing. Pain improves with rest/ elevation. She is experiencing some mechanical symptoms of locking and catching. Pain is worse with squatting or twisting. Review of Systems:   Negative for fever or chills. Negative for significant numbness or tingling in lower extremity. Past Medical History:   Diagnosis Date    Depression 2011    Headache(784.0) 6-7 months    pain in forehead    HTN (hypertension) 10/10/2011    Musculoskeletal back pain 1/10/2018    Primary osteoarthritis of right knee 1/10/2018       Family History   Problem Relation Age of Onset    Cancer Father     Diabetes Mother     High Blood Pressure Mother     Stroke Mother        Past Surgical History:   Procedure Laterality Date     SECTION  1990    COLONOSCOPY      problems with duodenum    HERNIA REPAIR      TUBAL LIGATION      VENTRAL HERNIA REPAIR  2012    VENTRAL HERNIA REPAIR WITH MESH              Social History     Occupational History    Not on file.      Social History Main Topics    Smoking status: Former Smoker     Packs/day: 0.25     Years: 37.00     Types: Cigarettes     Quit date: 2012    Smokeless tobacco: Never Used      Comment: smoke when nerves get bad    Alcohol use 0.0 oz/week      Comment: 2x a week    Drug use: No    Sexual activity: Yes     Partners: Male       Current Outpatient Prescriptions   Medication Sig Dispense Refill    diclofenac (VOLTAREN) 50 MG EC tablet Take 1 tablet by mouth Daily with supper 30 tablet 0    baclofen (LIORESAL) 10 MG tablet Take 1 tablet by mouth 3 times daily 90 tablet

## 2018-01-16 ENCOUNTER — TELEPHONE (OUTPATIENT)
Dept: ORTHOPEDIC SURGERY | Age: 58
End: 2018-01-16

## 2018-01-17 ASSESSMENT — ENCOUNTER SYMPTOMS
VOMITING: 0
SHORTNESS OF BREATH: 0
DIARRHEA: 0
RESPIRATORY NEGATIVE: 1
BACK PAIN: 1
NAUSEA: 0
RECTAL PAIN: 0
ABDOMINAL PAIN: 0
EYES NEGATIVE: 1
CONSTIPATION: 0
ABDOMINAL DISTENTION: 0

## 2018-01-17 ASSESSMENT — PATIENT HEALTH QUESTIONNAIRE - PHQ9
2. FEELING DOWN, DEPRESSED OR HOPELESS: 1
SUM OF ALL RESPONSES TO PHQ QUESTIONS 1-9: 2
SUM OF ALL RESPONSES TO PHQ9 QUESTIONS 1 & 2: 2
1. LITTLE INTEREST OR PLEASURE IN DOING THINGS: 1

## 2018-01-17 NOTE — PROGRESS NOTES
cetirizine (ZYRTEC) 10 MG tablet Take 1 tablet by mouth daily 30 tablet 2    Blood Pressure Monitoring (BLOOD PRESSURE CUFF) MISC by Does not apply route. 1 each 0     No current facility-administered medications on file prior to visit. Patient Active Problem List   Diagnosis    Nail fungus    Menopause syndrome    Essential hypertension    Pre-ulcerative corn or callous    Microscopic hematuria    Gastroesophageal reflux disease with esophagitis    Acute medial meniscal tear, right, initial encounter    Primary osteoarthritis of right knee    Musculoskeletal back pain     No Known Allergies    Review of Systems   Constitutional: Negative. Negative for fever. HENT: Negative for nosebleeds. Patient reports use over-the-counter nasal spray and has not had any other nosebleeds. Eyes: Negative. Respiratory: Negative. Negative for shortness of breath. Cardiovascular: Negative. Negative for chest pain and palpitations. Hypertension. Gastrointestinal: Negative for abdominal distention, abdominal pain, constipation, diarrhea, nausea, rectal pain and vomiting. GERD symptoms resolved on Dexilant. Genitourinary: Negative. Negative for dysuria and hematuria. Musculoskeletal: Positive for back pain. Negative for arthralgias, myalgias and neck pain. Bilateral knee pain. Skin: Negative. Neurological: Negative. Negative for numbness and headaches. Psychiatric/Behavioral: Negative. Objective:   Physical Exam   Constitutional: She is oriented to person, place, and time. She appears well-developed and well-nourished. No distress. HENT:   Head: Normocephalic and atraumatic. Right Ear: External ear normal.   Left Ear: External ear normal.   Nose: Nose normal.   Mouth/Throat: Oropharynx is clear and moist. No oropharyngeal exudate. Eyes: Conjunctivae and EOM are normal. Pupils are equal, round, and reactive to light. Right eye exhibits no discharge.

## 2018-01-25 ENCOUNTER — TELEPHONE (OUTPATIENT)
Dept: PRIMARY CARE CLINIC | Age: 58
End: 2018-01-25

## 2018-01-25 NOTE — TELEPHONE ENCOUNTER
Patient was supposed to be having a MRI done of the right knee at [facility] in [facility] [address] they were trying to charge her 400 dollars out of pocket.  'Patient called to request a release  to go back to work because she cant afford the 400 out of pocket   Phone: [phone]

## 2018-02-01 ENCOUNTER — TELEPHONE (OUTPATIENT)
Dept: PRIMARY CARE CLINIC | Age: 58
End: 2018-02-01

## 2018-02-01 NOTE — TELEPHONE ENCOUNTER
Patient in the office wanting to have paperwork completed. Patient unable to pay copay. And wants to reschedule her appt, patient requesting a phone call regarding returning to work and knee pain.     Call back number: [phone]  Please advise

## 2018-02-21 ENCOUNTER — TELEPHONE (OUTPATIENT)
Dept: PRIMARY CARE CLINIC | Age: 58
End: 2018-02-21

## 2018-02-21 NOTE — TELEPHONE ENCOUNTER
Patient has a scheduled appointment on 03/09/18 with Dr. Lita Richter. Patient is requesting to see Dr. Lita Richter as soon as possible for FMLA, knee pain, go over MRI results, discuss returning to work. Please follow up with the patient.

## 2018-02-23 ENCOUNTER — TELEPHONE (OUTPATIENT)
Dept: PRIMARY CARE CLINIC | Age: 58
End: 2018-02-23

## 2018-02-23 ENCOUNTER — OFFICE VISIT (OUTPATIENT)
Dept: PRIMARY CARE CLINIC | Age: 58
End: 2018-02-23

## 2018-02-23 VITALS
SYSTOLIC BLOOD PRESSURE: 118 MMHG | RESPIRATION RATE: 14 BRPM | OXYGEN SATURATION: 93 % | HEART RATE: 85 BPM | HEIGHT: 66 IN | BODY MASS INDEX: 28.28 KG/M2 | WEIGHT: 176 LBS | TEMPERATURE: 97.9 F | DIASTOLIC BLOOD PRESSURE: 77 MMHG

## 2018-02-23 DIAGNOSIS — M51.36 LUMBAR DEGENERATIVE DISC DISEASE: ICD-10-CM

## 2018-02-23 DIAGNOSIS — Z02.89 ENCOUNTER FOR COMPLETION OF FORM WITH PATIENT: Primary | ICD-10-CM

## 2018-02-23 DIAGNOSIS — M17.11 PRIMARY OSTEOARTHRITIS OF RIGHT KNEE: ICD-10-CM

## 2018-02-23 PROBLEM — M51.369 LUMBAR DEGENERATIVE DISC DISEASE: Status: ACTIVE | Noted: 2018-02-23

## 2018-02-23 PROCEDURE — 99213 OFFICE O/P EST LOW 20 MIN: CPT | Performed by: INTERNAL MEDICINE

## 2018-02-23 ASSESSMENT — ENCOUNTER SYMPTOMS
RECTAL PAIN: 0
SHORTNESS OF BREATH: 0
ABDOMINAL PAIN: 0
CONSTIPATION: 0
BACK PAIN: 1
ABDOMINAL DISTENTION: 0
EYES NEGATIVE: 1
DIARRHEA: 0
NAUSEA: 0
VOMITING: 0
RESPIRATORY NEGATIVE: 1

## 2018-02-23 NOTE — LETTER
[facility]  [address]  [address]  Phone: [phone]  Fax: [phone]    Caryle Iron, MD        February 23, 2018     Patient: Jake Alvarez   YOB: 1960   Date of Visit: 2/23/2018       To [facility]: Thank you for evaluating Minh Michaud for back pain and sciatica. Please fax me copy of MRI report and consultation report. Please direct patient concerning her limitations for work. Orders for back therapy and braces will need to come from your office, because you have the most current information and would know the best treatment. If you have any questions or concerns, please don't hesitate to call.     Sincerely,        Caryle Iron, MD

## 2018-02-23 NOTE — PROGRESS NOTES
disease     3. Primary osteoarthritis of right knee           Plan:      Alban Plascencia received counseling on the following healthy behaviors: medication adherence    Patient given educational materials on after visit summary with diagnosis and treatment plan    I have instructed Alban Plascencia to complete a self tracking handout on Blood Pressures  and Weights and instructed them to bring it with them to her next appointment. Discussed use, benefit, and side effects of prescribed medications. Barriers to medication compliance addressed. All patient questions answered. Pt voiced understanding.

## 2018-03-06 ENCOUNTER — TELEPHONE (OUTPATIENT)
Dept: PRIMARY CARE CLINIC | Age: 58
End: 2018-03-06

## 2018-03-06 NOTE — TELEPHONE ENCOUNTER
NOTE:   Patient called me back and I told her Dr. Kimber Savage was not here today. She made an appt to come in tomorrow for the work excuse.

## 2018-03-12 ENCOUNTER — OFFICE VISIT (OUTPATIENT)
Dept: PRIMARY CARE CLINIC | Age: 58
End: 2018-03-12

## 2018-03-12 VITALS
WEIGHT: 176.2 LBS | TEMPERATURE: 97.3 F | HEART RATE: 72 BPM | DIASTOLIC BLOOD PRESSURE: 95 MMHG | BODY MASS INDEX: 28.32 KG/M2 | SYSTOLIC BLOOD PRESSURE: 180 MMHG | OXYGEN SATURATION: 100 % | HEIGHT: 66 IN

## 2018-03-12 DIAGNOSIS — M25.561 RIGHT KNEE PAIN, UNSPECIFIED CHRONICITY: ICD-10-CM

## 2018-03-12 DIAGNOSIS — Z02.89 ENCOUNTER FOR COMPLETION OF FORM WITH PATIENT: ICD-10-CM

## 2018-03-12 DIAGNOSIS — M48.061 NEURAL FORAMINAL STENOSIS OF LUMBAR SPINE: Primary | ICD-10-CM

## 2018-03-12 PROCEDURE — 99213 OFFICE O/P EST LOW 20 MIN: CPT | Performed by: INTERNAL MEDICINE

## 2018-03-12 RX ORDER — METHYLPREDNISOLONE 4 MG/1
TABLET ORAL
Qty: 1 KIT | Refills: 0 | Status: SHIPPED | OUTPATIENT
Start: 2018-03-12 | End: 2018-03-18

## 2018-03-12 RX ORDER — GABAPENTIN 300 MG/1
300 CAPSULE ORAL 3 TIMES DAILY
Qty: 90 CAPSULE | Refills: 3 | Status: SHIPPED | OUTPATIENT
Start: 2018-03-12 | End: 2018-05-14 | Stop reason: SDUPTHER

## 2018-03-12 ASSESSMENT — ENCOUNTER SYMPTOMS
ABDOMINAL DISTENTION: 0
RESPIRATORY NEGATIVE: 1
EYES NEGATIVE: 1
DIARRHEA: 0
NAUSEA: 0
VOMITING: 0
CONSTIPATION: 0
SHORTNESS OF BREATH: 0
BOWEL INCONTINENCE: 0
ABDOMINAL PAIN: 0
BACK PAIN: 1
RECTAL PAIN: 0

## 2018-04-17 ENCOUNTER — OFFICE VISIT (OUTPATIENT)
Dept: ORTHOPEDIC SURGERY | Age: 58
End: 2018-04-17

## 2018-04-17 VITALS
WEIGHT: 176.15 LBS | SYSTOLIC BLOOD PRESSURE: 139 MMHG | DIASTOLIC BLOOD PRESSURE: 89 MMHG | HEIGHT: 66 IN | HEART RATE: 80 BPM | BODY MASS INDEX: 28.31 KG/M2

## 2018-04-17 DIAGNOSIS — M51.36 DDD (DEGENERATIVE DISC DISEASE), LUMBAR: Primary | ICD-10-CM

## 2018-04-17 PROCEDURE — 99214 OFFICE O/P EST MOD 30 MIN: CPT | Performed by: PHYSICIAN ASSISTANT

## 2018-04-23 ENCOUNTER — TELEPHONE (OUTPATIENT)
Dept: ORTHOPEDIC SURGERY | Age: 58
End: 2018-04-23

## 2018-05-14 ENCOUNTER — OFFICE VISIT (OUTPATIENT)
Dept: PRIMARY CARE CLINIC | Age: 58
End: 2018-05-14

## 2018-05-14 VITALS
WEIGHT: 178 LBS | TEMPERATURE: 98 F | RESPIRATION RATE: 18 BRPM | OXYGEN SATURATION: 97 % | DIASTOLIC BLOOD PRESSURE: 80 MMHG | BODY MASS INDEX: 28.74 KG/M2 | HEART RATE: 79 BPM | SYSTOLIC BLOOD PRESSURE: 130 MMHG

## 2018-05-14 DIAGNOSIS — I10 ESSENTIAL HYPERTENSION: Primary | ICD-10-CM

## 2018-05-14 DIAGNOSIS — M51.36 LUMBAR DEGENERATIVE DISC DISEASE: ICD-10-CM

## 2018-05-14 DIAGNOSIS — M54.50 LOW BACK PAIN AT MULTIPLE SITES: ICD-10-CM

## 2018-05-14 DIAGNOSIS — M48.061 NEURAL FORAMINAL STENOSIS OF LUMBAR SPINE: ICD-10-CM

## 2018-05-14 PROCEDURE — 99213 OFFICE O/P EST LOW 20 MIN: CPT | Performed by: INTERNAL MEDICINE

## 2018-05-14 RX ORDER — GABAPENTIN 300 MG/1
300 CAPSULE ORAL 3 TIMES DAILY
Qty: 90 CAPSULE | Refills: 3 | Status: SHIPPED | OUTPATIENT
Start: 2018-05-14 | End: 2018-06-06 | Stop reason: SDUPTHER

## 2018-05-14 RX ORDER — DULOXETIN HYDROCHLORIDE 30 MG/1
30 CAPSULE, DELAYED RELEASE ORAL DAILY
Qty: 30 CAPSULE | Refills: 3 | Status: SHIPPED | OUTPATIENT
Start: 2018-05-14 | End: 2018-06-06 | Stop reason: SDUPTHER

## 2018-05-15 ASSESSMENT — ENCOUNTER SYMPTOMS
RESPIRATORY NEGATIVE: 1
SHORTNESS OF BREATH: 0
NAUSEA: 0
DIARRHEA: 0
ABDOMINAL PAIN: 0
CONSTIPATION: 0
VOMITING: 0
RECTAL PAIN: 0
ABDOMINAL DISTENTION: 0
BACK PAIN: 1
EYES NEGATIVE: 1

## 2018-05-29 ENCOUNTER — TELEPHONE (OUTPATIENT)
Dept: PRIMARY CARE CLINIC | Age: 58
End: 2018-05-29

## 2018-05-31 ENCOUNTER — TELEPHONE (OUTPATIENT)
Dept: PRIMARY CARE CLINIC | Age: 58
End: 2018-05-31

## 2018-06-06 ENCOUNTER — OFFICE VISIT (OUTPATIENT)
Dept: PRIMARY CARE CLINIC | Age: 58
End: 2018-06-06

## 2018-06-06 VITALS
DIASTOLIC BLOOD PRESSURE: 94 MMHG | WEIGHT: 180 LBS | HEART RATE: 69 BPM | TEMPERATURE: 98 F | SYSTOLIC BLOOD PRESSURE: 150 MMHG | BODY MASS INDEX: 29.07 KG/M2 | RESPIRATION RATE: 18 BRPM | OXYGEN SATURATION: 100 %

## 2018-06-06 DIAGNOSIS — I10 ESSENTIAL HYPERTENSION: Primary | ICD-10-CM

## 2018-06-06 DIAGNOSIS — M54.32 SCIATICA, LEFT SIDE: ICD-10-CM

## 2018-06-06 PROCEDURE — 99213 OFFICE O/P EST LOW 20 MIN: CPT | Performed by: INTERNAL MEDICINE

## 2018-06-06 RX ORDER — NEBIVOLOL 5 MG/1
5 TABLET ORAL DAILY
Qty: 30 TABLET | Refills: 5 | Status: SHIPPED | OUTPATIENT
Start: 2018-06-06 | End: 2018-08-24 | Stop reason: SDUPTHER

## 2018-06-06 RX ORDER — METHYLPREDNISOLONE 4 MG/1
TABLET ORAL
Qty: 1 KIT | Refills: 0 | Status: SHIPPED | OUTPATIENT
Start: 2018-06-06 | End: 2018-06-12

## 2018-06-06 RX ORDER — DULOXETINE 40 MG/1
40 CAPSULE, DELAYED RELEASE ORAL DAILY
Qty: 30 CAPSULE | Refills: 5 | Status: SHIPPED | OUTPATIENT
Start: 2018-06-06 | End: 2020-02-14

## 2018-06-06 RX ORDER — OLMESARTAN MEDOXOMIL AND HYDROCHLOROTHIAZIDE 20/12.5 20; 12.5 MG/1; MG/1
1 TABLET ORAL DAILY
Qty: 30 TABLET | Refills: 3 | Status: SHIPPED | OUTPATIENT
Start: 2018-06-06 | End: 2018-08-24 | Stop reason: SDUPTHER

## 2018-06-06 RX ORDER — GABAPENTIN 300 MG/1
300 CAPSULE ORAL 3 TIMES DAILY
Qty: 90 CAPSULE | Refills: 5 | Status: SHIPPED | OUTPATIENT
Start: 2018-06-06 | End: 2018-08-24 | Stop reason: SDUPTHER

## 2018-06-06 ASSESSMENT — PATIENT HEALTH QUESTIONNAIRE - PHQ9
1. LITTLE INTEREST OR PLEASURE IN DOING THINGS: 1
SUM OF ALL RESPONSES TO PHQ QUESTIONS 1-9: 2
2. FEELING DOWN, DEPRESSED OR HOPELESS: 1
SUM OF ALL RESPONSES TO PHQ9 QUESTIONS 1 & 2: 2

## 2018-06-10 ASSESSMENT — ENCOUNTER SYMPTOMS
BACK PAIN: 1
ABDOMINAL DISTENTION: 0
BLURRED VISION: 0
NAUSEA: 0
BOWEL INCONTINENCE: 0
RESPIRATORY NEGATIVE: 1
SHORTNESS OF BREATH: 0
RECTAL PAIN: 0
CONSTIPATION: 0
DIARRHEA: 0
EYES NEGATIVE: 1
ABDOMINAL PAIN: 0
VOMITING: 0

## 2018-07-03 ENCOUNTER — OFFICE VISIT (OUTPATIENT)
Dept: ORTHOPEDIC SURGERY | Age: 58
End: 2018-07-03

## 2018-07-03 VITALS — BODY MASS INDEX: 29.32 KG/M2 | HEIGHT: 65 IN | WEIGHT: 176 LBS

## 2018-07-03 DIAGNOSIS — M51.36 LUMBAR DEGENERATIVE DISC DISEASE: Primary | ICD-10-CM

## 2018-07-03 PROCEDURE — 99213 OFFICE O/P EST LOW 20 MIN: CPT | Performed by: ORTHOPAEDIC SURGERY

## 2018-07-03 NOTE — PROGRESS NOTES
History of present illness:   Ms. Chaz Balderas  is a pleasant 66 y.o. female with a PMH of HTN and depression kindly referred by Dr. Luisito Daigle for consultation regarding her LBP and infrequent right leg pain. She states her pain began insidiously about 1 year ago. Her pain has steadily increased since then. She rates her back pain 4/10 and leg pain 8/10. She describes the pain as aching and stabbing. The leg pain infrequently radiates down the anterior aspect of her leg to her right shin. She notes intermittent numbness and tingling in her right anterior leg. She denies weakness of her legs and denies bowel or bladder dysfunction. She states she can sit as long as she likes and stand for a maximum of 10 minutes. The pain moderately disrupts her sleep. She sleeps in a recliner. She has tried oral steroids with mild relief and a right hip injection. She takes Gabapentin. Past medical history:  Her past medical history has been reviewed and is significant for HTN and depression. Past surgical history:  Her past surgical history has been reviewed and is non-contributory to her present illness. Her medications and allergies were reviewed. Social history:  Her social history has been reviewed and she is a former smoker. Review of symptoms:  Patient's review of symptoms was reviewed and is significant for back pain and negative for recent weight loss, fatigue, chills, visual disturbances, blood in stool or urine, recent infection, chest pain, or shortness of breath. All other ROS were negative. Physical examination:    General exam:  She is well-developed and well-nourished, is in obvious pain and alert and oriented to person, place, and time. She demonstrates appropriate mood and affect. Her skin is warm and dry. Her gait is normal and she walks heel to toe without limp or instability. Back:  She stands with slight lumbar flexion.  Her lumbar flexion, extension and lateral bending are moderately reduced with pain. She has mild tenderness over her lumbar spine without obvious muscle spasm. The skin over her lumbar spine is normal without a surgical scar. Lower extremities:  She has 5/5 motor strength of her left lower extremity and 5-/5 pain related weakness of the muscle groups of her right lower extremity . She has a negative straight leg raise, bilaterally. Deep tendon reflexes at knees and achilles are 2+. Sensation is intact to light touch L3 to S1 bilaterally. She has no clonus. Hip range of motion painless. Abdomen:  Non-tender and non-distended. Abdomen is not obese. No rash. Imaging:  Lumbar radiology report from 6/26/17 showing AP and lateral views notes moderate degenerative disc changes at L4-5. I reviewed MRI images of her lumbar spine the office today. They show degenerative disc disease from L3 to the sacrum with a left paracentral disc herniation L4-L5. Assessment:  Lumbar spondylosis with radiculopathy    Plan:  We discussed treatment options including observation, additional oral steroids, physical therapy, and epidural injections. I recommended she see Dr. Do Ballesteros  for possible injections.

## 2018-07-16 ENCOUNTER — TELEPHONE (OUTPATIENT)
Dept: ADMINISTRATIVE | Age: 58
End: 2018-07-16

## 2018-07-20 ENCOUNTER — OFFICE VISIT (OUTPATIENT)
Dept: ORTHOPEDIC SURGERY | Age: 58
End: 2018-07-20

## 2018-07-20 VITALS
WEIGHT: 176 LBS | SYSTOLIC BLOOD PRESSURE: 135 MMHG | DIASTOLIC BLOOD PRESSURE: 74 MMHG | HEIGHT: 65 IN | BODY MASS INDEX: 29.32 KG/M2

## 2018-07-20 DIAGNOSIS — M51.36 DDD (DEGENERATIVE DISC DISEASE), LUMBAR: ICD-10-CM

## 2018-07-20 DIAGNOSIS — M51.26 LUMBAR DISC HERNIATION: Primary | ICD-10-CM

## 2018-07-20 DIAGNOSIS — M54.16 LUMBAR RADICULOPATHY: ICD-10-CM

## 2018-07-20 PROCEDURE — 99243 OFF/OP CNSLTJ NEW/EST LOW 30: CPT | Performed by: PHYSICAL MEDICINE & REHABILITATION

## 2018-07-20 RX ORDER — METHYLPREDNISOLONE 4 MG/1
TABLET ORAL
Qty: 1 KIT | Refills: 0 | Status: SHIPPED | OUTPATIENT
Start: 2018-07-20 | End: 2018-07-26

## 2018-07-20 NOTE — PROGRESS NOTES
New Patient: SPINE    Referring Provider:  Monika Mckenna MD    CHIEF COMPLAINT:    Chief Complaint   Patient presents with    Lower Back Pain     intermittent pain x6-8 months, no recent injury, states her current job duites include a lot of bending / lifting / walking which increase her symptoms    Leg Pain     right, down to the foot, also has a numbness and burning sensation       HISTORY OF PRESENT ILLNESS:      · The patient is being sent at the request of Monika Mckenna MD in consultation as a new spine patient for low back pain and right leg pain. The patient is a 66 y.o. female whom reports he has had low back pain for the past 6 months. She can identify any inciting event or any injury. 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 She has also developed pain radiating to her right foot. She was seen 1st at the [facility] and decided she does not want surgery. She then saw Dr. Panda Jamison who recommended maximizing her nonsurgical options. She states she is nervous about proceeding with any type of interventions.     Pain Assessment  Location of Pain: Back  Location Modifiers: Posterior, Right  Severity of Pain: 6  Quality of Pain: Aching, Sharp (Numbness / Burning)  Duration of Pain: Persistent  Frequency of Pain: Intermittent  Aggravating Factors: Bending, Walking (Lifting)  Limiting Behavior: Yes  Relieving Factors: Rest  Result of Injury: No  Work-Related Injury: No  Are there other pain locations you wish to document?: No      Associated signs and symptoms:   Neurogenic bowel or bladder symptoms:  no   Perceived weakness:  no   Difficulty walking:  yes    Recent Imaging (within past one year)   Xrays: yes   MRI or CT of spine: yes    Current/Past Treatment:   · Physical Therapy:  none  · Chiropractic:  none  · Injection:  none  · Medications:   NSAIDS:  yes   Muscle relaxer:  none   Steriods:  none   Neuropathic medications: neurontin   Opioids:  none  · Previous surgery:  no  · Previous surgical consult:  yes                Past Medical History:   Past Medical History:   Diagnosis Date    Depression 12/17/2011    Headache(784.0) 6-7 months    pain in forehead    HTN (hypertension) 10/10/2011    Musculoskeletal back pain 1/10/2018    Primary osteoarthritis of right knee 1/10/2018      Past Surgical History:     Past Surgical History:   Procedure Laterality Date   [place], 1990    COLONOSCOPY  2003    problems with duodenum    HERNIA REPAIR      TUBAL LIGATION  1990    VENTRAL HERNIA REPAIR  12/03/2012    VENTRAL HERNIA REPAIR WITH MESH            Current Medications:     Current Outpatient Prescriptions:     methylPREDNISolone (MEDROL, SONI,) 4 MG tablet, Take by mouth., Disp: 1 kit, Rfl: 0    nebivolol (BYSTOLIC) 5 MG tablet, Take 1 tablet by mouth daily, Disp: 30 tablet, Rfl: 5    olmesartan-hydrochlorothiazide (BENICAR HCT) 20-12.5 MG per tablet, Take 1 tablet by mouth daily Stop amlodipine/olmesartan/hactz, Disp: 30 tablet, Rfl: 3    DULoxetine 40 MG CPEP, Take 40 mg by mouth daily, Disp: 30 capsule, Rfl: 5    gabapentin (NEURONTIN) 300 MG capsule, Take 1 capsule by mouth 3 times daily. ., Disp: 90 capsule, Rfl: 5    dexlansoprazole (DEXILANT) 60 MG CPDR delayed release capsule, Take 1 capsule by mouth daily Discontinue omeprazole, Disp: 30 capsule, Rfl: 5    Cholecalciferol (VITAMIN D3) 5000 UNITS TBDP, Take 1 each by mouth daily, Disp: 30 tablet, Rfl: 5    cetirizine (ZYRTEC) 10 MG tablet, Take 1 tablet by mouth daily, Disp: 30 tablet, Rfl: 2    Blood Pressure Monitoring (BLOOD PRESSURE CUFF) MISC, by Does not apply route., Disp: 1 each, Rfl: 0  Allergies:  Patient has no known allergies. Social History:    reports that she quit smoking about 6 years ago. Her smoking use included Cigarettes. She has a 9.25 pack-year smoking history.  She has never used smokeless tobacco. She reports that she drinks alcohol. She reports that she does not use drugs. Family History:   Family History   Problem Relation Age of Onset    Cancer Father     Diabetes Mother     High Blood Pressure Mother     Stroke Mother          REVIEW OF SYSTEMS: Full ROS noted & scanned          PHYSICAL EXAM:    Vitals: Blood pressure 135/74, height 5' 5\" (1.651 m), weight 176 lb (79.8 kg), not currently breastfeeding. GENERAL EXAM:  · General Apparence: Patient is adequately groomed with no evidence of malnutrition. · Psychiatric: Orientation: The patient is oriented to time, place and person. The patient's mood and affect are appropriate   · Vascular: Examination reveals no swelling and palpation reveals no tenderness in upper or lower extremities. Good capillary refill. Sensation is intact without deficit in the upper and lower extremities to light touch and pinprick  · Coordination of the upper and lower extremities are normal.      LUMBAR/SACRAL EXAMINATION:  · Inspection: Local inspection shows no step-off or bruising. Lumbar alignment is normal. No instability is noted. · Palpation:   No evidence of tenderness at the midline. Positive for tenderness over the L5-S1 paraspinals  There is no paraspinal spasm. · Range of Motion: limited by 50% in all planes due to pain  · Strength:   Strength testing is 5/5 in all muscle groups tested. · Special Tests:   Straight leg raise is positive on the right side and crossed SLR negative. Ian's testing is negative bilaterally. FADIR's testing is negative bilaterally. · Skin: There are no rashes, ulcerations or lesions. · Reflexes: Reflexes are symmetrically 1+ at the patellar and ankle tendons. Clonus absent bilaterally at the feet. · Gait & station: antalgic on the right  · Additional Examinations:  · RIGHT LOWER EXTREMITY: Inspection/examination of the right lower extremity does not show any tenderness, deformity or injury. Range of motion is unremarkable.  There is no gross instability. There are no rashes, ulcerations or lesions. Strength and tone are normal. No atrophy or abnormal movements are noted. · LEFT LOWER EXTREMITY:  Inspection/examination of the left lower extremity does not show any tenderness, deformity or injury. Range of motion is unremarkable. There is no gross instability. There are no rashes, ulcerations or lesions. Strength and tone are normal. No atrophy or abnormal movements are noted. Diagnostic Testing:    Xrays:   Lumbar spine films from 6/26/2017 show L4 5 moderate degenerative disc disease  MRI or CT:  MRI lumbar spine to 2/21/2018 show multilevel degenerative disc disease with disc bulging L2 through S1  EMG:  None  Results for orders placed or performed in visit on 05/26/17   Vitamin D 25 Hydroxy   Result Value Ref Range    Vit D, 25-Hydroxy 14.5 (L) >=30 ng/mL       Impression (Medical Decision Making):       1. Lumbar disc herniation    2. DDD (degenerative disc disease), lumbar    3. Lumbar radiculopathy        Plan (Medical Decision Making):    1. Medications:  I will prescribe a medrol dose pack to help with the inflammatory component of pain. Risks, benefits and alternatives were discussed. Recommended to stop any NSAIDs to reduce risk of GI bleed during the course of the dose pack. 2. PT:  I will start the patient on a trial of PT to work on a lumbar stabilization program to focus on core strengthening, core stabilizing, lumbar stretches, hamstring flexibility, modalities as indicated for 6-8 visits over the next 4-6 weeks. 3. Further studies:  No further studies at this time  4. Interventional:  At this point she is very nervous about proceeding with any interventions. We will hold off  5. Healthy Lifestyle Measures:  Patient education material - Anatomic drawings, healthy lifestyle education, osteoporosis prevention, back and neck pain educational information, and advanced imaging preparedness were distributed to the patient.  Posture

## 2018-08-24 ENCOUNTER — TELEPHONE (OUTPATIENT)
Dept: PRIMARY CARE CLINIC | Age: 58
End: 2018-08-24

## 2018-08-24 ENCOUNTER — OFFICE VISIT (OUTPATIENT)
Dept: PRIMARY CARE CLINIC | Age: 58
End: 2018-08-24

## 2018-08-24 VITALS
RESPIRATION RATE: 18 BRPM | DIASTOLIC BLOOD PRESSURE: 80 MMHG | OXYGEN SATURATION: 99 % | SYSTOLIC BLOOD PRESSURE: 130 MMHG | HEART RATE: 82 BPM | BODY MASS INDEX: 29.45 KG/M2 | TEMPERATURE: 97.5 F | WEIGHT: 177 LBS

## 2018-08-24 DIAGNOSIS — M25.512 ACUTE PAIN OF LEFT SHOULDER: ICD-10-CM

## 2018-08-24 DIAGNOSIS — M54.32 SCIATICA, LEFT SIDE: ICD-10-CM

## 2018-08-24 DIAGNOSIS — S20.212A CONTUSION OF LEFT CHEST WALL, INITIAL ENCOUNTER: ICD-10-CM

## 2018-08-24 DIAGNOSIS — S80.02XA CONTUSION OF LEFT KNEE, INITIAL ENCOUNTER: ICD-10-CM

## 2018-08-24 DIAGNOSIS — I10 ESSENTIAL HYPERTENSION: ICD-10-CM

## 2018-08-24 DIAGNOSIS — K27.9 PUD (PEPTIC ULCER DISEASE): ICD-10-CM

## 2018-08-24 DIAGNOSIS — E55.9 VITAMIN D DEFICIENCY: ICD-10-CM
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
## 2018-08-24 PROCEDURE — 99213 OFFICE O/P EST LOW 20 MIN: CPT | Performed by: INTERNAL MEDICINE

## 2018-08-24 RX ORDER — DEXLANSOPRAZOLE 60 MG/1
60 CAPSULE, DELAYED RELEASE ORAL DAILY
Qty: 30 CAPSULE | Refills: 5 | Status: SHIPPED | OUTPATIENT
Start: 2018-08-24 | End: 2020-02-14 | Stop reason: SDUPTHER

## 2018-08-24 RX ORDER — NEBIVOLOL 5 MG/1
5 TABLET ORAL DAILY
Qty: 30 TABLET | Refills: 5 | Status: SHIPPED | OUTPATIENT
Start: 2018-08-24 | End: 2020-02-14

## 2018-08-24 RX ORDER — ONDANSETRON 4 MG/1
4 TABLET, ORALLY DISINTEGRATING ORAL
COMMUNITY
Start: 2018-07-11 | End: 2020-02-14

## 2018-08-24 RX ORDER — GABAPENTIN 300 MG/1
300 CAPSULE ORAL 3 TIMES DAILY
Qty: 90 CAPSULE | Refills: 5 | Status: SHIPPED | OUTPATIENT
Start: 2018-08-24 | End: 2020-02-14 | Stop reason: SDUPTHER

## 2018-08-24 RX ORDER — OLMESARTAN MEDOXOMIL AND HYDROCHLOROTHIAZIDE 20/12.5 20; 12.5 MG/1; MG/1
1 TABLET ORAL DAILY
Qty: 30 TABLET | Refills: 3 | Status: SHIPPED | OUTPATIENT
Start: 2018-08-24 | End: 2020-02-14 | Stop reason: SDUPTHER

## 2018-08-24 ASSESSMENT — ENCOUNTER SYMPTOMS
ABDOMINAL PAIN: 0
CONSTIPATION: 0
DIARRHEA: 0
RECTAL PAIN: 0
EYES NEGATIVE: 1
SHORTNESS OF BREATH: 0
BOWEL INCONTINENCE: 0
VOMITING: 0
RESPIRATORY NEGATIVE: 1
BACK PAIN: 1
NAUSEA: 0
VISUAL CHANGE: 0
ABDOMINAL DISTENTION: 0

## 2018-08-24 NOTE — TELEPHONE ENCOUNTER
[facility] does not have disintegrating Vitamin D. Will regular tabs be OK?     Radha Caldera:  [phone]

## 2018-08-24 NOTE — LETTER
[address]  [address]  Phone: [phone]  Fax: [phone]    Farzad Banegas MD        August 24, 2018     Patient: Silvia Pickett   YOB: 1960   Date of Visit: 8/24/2018       To Whom It May Concern: It is my medical opinion that Carlie David is unable to work from 8/21/18 until 9/2/18. May return on 9/3/18 without restrictions. .    If you have any questions or concerns, please don't hesitate to call.     Sincerely,          Farzad Banegas MD

## 2018-08-28 ENCOUNTER — TELEPHONE (OUTPATIENT)
Dept: PRIMARY CARE CLINIC | Age: 58
End: 2018-08-28

## 2018-08-28 ASSESSMENT — PATIENT HEALTH QUESTIONNAIRE - PHQ9
SUM OF ALL RESPONSES TO PHQ QUESTIONS 1-9: 0
SUM OF ALL RESPONSES TO PHQ9 QUESTIONS 1 & 2: 0
2. FEELING DOWN, DEPRESSED OR HOPELESS: 0
1. LITTLE INTEREST OR PLEASURE IN DOING THINGS: 0
SUM OF ALL RESPONSES TO PHQ QUESTIONS 1-9: 0

## 2018-08-29 ENCOUNTER — TELEPHONE (OUTPATIENT)
Dept: PRIMARY CARE CLINIC | Age: 58
End: 2018-08-29

## 2018-08-29 NOTE — TELEPHONE ENCOUNTER
Jonathan Gonzalez wanted to stop by today to  a return to work form as she is feeling better and wants to return to work today. She works at 4:00 PM today. Please call Jonathan Gonzalez when ready.     [phone]

## 2018-09-12 ENCOUNTER — HOSPITAL ENCOUNTER (OUTPATIENT)
Dept: OTHER | Age: 58
Discharge: HOME | End: 2018-09-12
Attending: INTERNAL MEDICINE | Admitting: INTERNAL MEDICINE

## 2018-09-12 DIAGNOSIS — M25.512 ACUTE PAIN OF LEFT SHOULDER: ICD-10-CM

## 2018-09-12 DIAGNOSIS — S20.212A CONTUSION OF LEFT CHEST WALL, INITIAL ENCOUNTER: ICD-10-CM

## 2018-09-12 DIAGNOSIS — S80.02XA CONTUSION OF LEFT KNEE, INITIAL ENCOUNTER: ICD-10-CM

## 2018-10-22 ENCOUNTER — TELEPHONE (OUTPATIENT)
Dept: PRIMARY CARE CLINIC | Age: 58
End: 2018-10-22

## 2018-10-23 ENCOUNTER — TELEPHONE (OUTPATIENT)
Dept: PRIMARY CARE CLINIC | Age: 58
End: 2018-10-23

## 2019-03-25 ENCOUNTER — OFFICE VISIT (OUTPATIENT)
Dept: PRIMARY CARE CLINIC | Age: 59
End: 2019-03-25
Payer: COMMERCIAL

## 2019-03-25 VITALS
BODY MASS INDEX: 27.94 KG/M2 | HEIGHT: 67 IN | SYSTOLIC BLOOD PRESSURE: 122 MMHG | DIASTOLIC BLOOD PRESSURE: 69 MMHG | HEART RATE: 78 BPM | TEMPERATURE: 98.4 F | RESPIRATION RATE: 16 BRPM | OXYGEN SATURATION: 98 % | WEIGHT: 178 LBS

## 2019-03-25 DIAGNOSIS — J02.9 SORE THROAT: Primary | ICD-10-CM

## 2019-03-25 DIAGNOSIS — J06.9 VIRAL URI WITH COUGH: ICD-10-CM

## 2019-03-25 DIAGNOSIS — R60.0 PEDAL EDEMA: ICD-10-CM

## 2019-03-25 LAB — S PYO AG THROAT QL: NORMAL

## 2019-03-25 PROCEDURE — 87880 STREP A ASSAY W/OPTIC: CPT | Performed by: FAMILY MEDICINE

## 2019-03-25 PROCEDURE — 99213 OFFICE O/P EST LOW 20 MIN: CPT | Performed by: FAMILY MEDICINE

## 2019-03-25 ASSESSMENT — PATIENT HEALTH QUESTIONNAIRE - PHQ9
SUM OF ALL RESPONSES TO PHQ QUESTIONS 1-9: 0
SUM OF ALL RESPONSES TO PHQ9 QUESTIONS 1 & 2: 0
SUM OF ALL RESPONSES TO PHQ QUESTIONS 1-9: 0
1. LITTLE INTEREST OR PLEASURE IN DOING THINGS: 0
SUM OF ALL RESPONSES TO PHQ9 QUESTIONS 1 & 2: 0
1. LITTLE INTEREST OR PLEASURE IN DOING THINGS: 0
SUM OF ALL RESPONSES TO PHQ QUESTIONS 1-9: 0
SUM OF ALL RESPONSES TO PHQ QUESTIONS 1-9: 0
2. FEELING DOWN, DEPRESSED OR HOPELESS: 0
2. FEELING DOWN, DEPRESSED OR HOPELESS: 0

## 2020-02-14 ENCOUNTER — OFFICE VISIT (OUTPATIENT)
Dept: PRIMARY CARE CLINIC | Age: 60
End: 2020-02-14
Payer: COMMERCIAL

## 2020-02-14 VITALS
HEART RATE: 63 BPM | WEIGHT: 169 LBS | DIASTOLIC BLOOD PRESSURE: 88 MMHG | RESPIRATION RATE: 18 BRPM | TEMPERATURE: 97.9 F | BODY MASS INDEX: 26.53 KG/M2 | OXYGEN SATURATION: 97 % | HEIGHT: 67 IN | SYSTOLIC BLOOD PRESSURE: 136 MMHG

## 2020-02-14 PROCEDURE — 99396 PREV VISIT EST AGE 40-64: CPT | Performed by: INTERNAL MEDICINE

## 2020-02-14 RX ORDER — DEXLANSOPRAZOLE 60 MG/1
60 CAPSULE, DELAYED RELEASE ORAL DAILY
Qty: 30 CAPSULE | Refills: 5 | Status: SHIPPED | OUTPATIENT
Start: 2020-02-14 | End: 2020-04-09 | Stop reason: SDUPTHER

## 2020-02-14 RX ORDER — OLMESARTAN MEDOXOMIL AND HYDROCHLOROTHIAZIDE 20/12.5 20; 12.5 MG/1; MG/1
1 TABLET ORAL DAILY
Qty: 30 TABLET | Refills: 11 | Status: SHIPPED | OUTPATIENT
Start: 2020-02-14 | End: 2020-04-09

## 2020-02-14 RX ORDER — METHYLPREDNISOLONE 4 MG/1
TABLET ORAL
Qty: 1 KIT | Refills: 0 | Status: SHIPPED | OUTPATIENT
Start: 2020-02-14 | End: 2020-02-20

## 2020-02-14 RX ORDER — GABAPENTIN 300 MG/1
300 CAPSULE ORAL 3 TIMES DAILY
Qty: 90 CAPSULE | Refills: 5 | Status: SHIPPED | OUTPATIENT
Start: 2020-02-14 | End: 2020-07-20 | Stop reason: SDUPTHER

## 2020-02-14 ASSESSMENT — ENCOUNTER SYMPTOMS
NAUSEA: 0
EYES NEGATIVE: 1
VOMITING: 0
BACK PAIN: 1
RESPIRATORY NEGATIVE: 1
ABDOMINAL DISTENTION: 0
RECTAL PAIN: 0
SHORTNESS OF BREATH: 0
BOWEL INCONTINENCE: 0
DIARRHEA: 0
ABDOMINAL PAIN: 0
CONSTIPATION: 0

## 2020-02-14 ASSESSMENT — PATIENT HEALTH QUESTIONNAIRE - PHQ9
2. FEELING DOWN, DEPRESSED OR HOPELESS: 0
DEPRESSION UNABLE TO ASSESS: FUNCTIONAL CAPACITY MOTIVATION LIMITS ACCURACY
SUM OF ALL RESPONSES TO PHQ9 QUESTIONS 1 & 2: 0
SUM OF ALL RESPONSES TO PHQ QUESTIONS 1-9: 0
SUM OF ALL RESPONSES TO PHQ QUESTIONS 1-9: 0
1. LITTLE INTEREST OR PLEASURE IN DOING THINGS: 0

## 2020-02-14 NOTE — PROGRESS NOTES
2020    Jonas Schmitz (:  1960) is a 64 y.o. female, here for a preventive medicine evaluation. Back Pain   The current episode started more than 1 month ago. The problem occurs constantly. The quality of the pain is described as aching. The pain radiates to the left thigh and left foot. The pain is severe. The symptoms are aggravated by bending and standing. Stiffness is present all day. Associated symptoms include bladder incontinence, leg pain and tingling. Pertinent negatives include no abdominal pain, bowel incontinence, chest pain, dysuria, fever, headaches, numbness, paresis, paresthesias, pelvic pain, perianal numbness, weakness or weight loss. (Intermittent bladder incontinence) Risk factors: no recent trauma or history of steroids. She has tried nothing for the symptoms. The treatment provided no relief. 2017  X-ray Lumbar spine 2 or 3-views2017   Health  Result Impression   IMPRESSION:  Moderate degenerative disc disease at L4-5 as described. Report Verified by: Divya Mathew M.D. at 2017 3:07 PM EDT   Result Narrative   EXAM: XR LUMBAR SPINE 2 OR 3-VIEWS dated 2017 1:08 PM EDT    CLINICAL HISTORY: Low back pain;     COMPARISON: None    FINDINGS:  5 lumbar type vertebral bodies are identified. The vertebral alignment is intact. Vertebral body heights are maintained. Moderate disc space narrowing is present at L4-5 with vacuum phenomena. The remainder of the disc spaces are preserved. Anterior hypertrophic spurring is present at several levels. Pelvic phleboliths are present. Other Result Information   Interface, Results In - 2017  3:09 PM EDT  EXAM: XR LUMBAR SPINE 2 OR 3-VIEWS dated 2017 1:08 PM EDT    CLINICAL HISTORY: Low back pain;     COMPARISON: None    FINDINGS:  5 lumbar type vertebral bodies are identified. The vertebral alignment is intact. Vertebral body heights are maintained.     Moderate disc space narrowing is Content: Thought content normal.         Judgment: Judgment normal.         No flowsheet data found. Lab Results   Component Value Date    CHOL 206 11/29/2012    CHOL 186 12/16/2011    TRIG 86 12/16/2011    HDL 72 12/16/2011    LDLCALC 97 12/16/2011    GLUCOSE 97 05/26/2017    LABA1C 5.3 10/20/2011       The ASCVD Risk score (Krista Ruiz, et al., 2013) failed to calculate for the following reasons:    Cannot find a previous HDL lab    Cannot find a previous total cholesterol lab    Immunization History   Administered Date(s) Administered    Influenza, Intradermal, Preservative free 10/03/2016       Health Maintenance   Topic Date Due    DTaP/Tdap/Td vaccine (1 - Tdap) 03/21/1973    Shingles Vaccine (1 of 2) 03/21/2012    Colon cancer screen colonoscopy  03/21/2012    Cervical cancer screen  12/23/2014    Lipid screen  11/29/2017    Potassium monitoring  05/26/2018    Creatinine monitoring  05/26/2018    Breast cancer screen  02/24/2019    Flu vaccine (1) 09/01/2019    Hepatitis C screen  Completed    HIV screen  Completed    Hepatitis A vaccine  Aged Out    Hepatitis B vaccine  Aged Out    Hib vaccine  Aged Out    Meningococcal (ACWY) vaccine  Aged Out    Pneumococcal 0-64 years Vaccine  Aged Out       ASSESSMENT/PLAN:   Diagnosis Orders   1. Encounter for preventative adult health care exam with abnormal findings  TSH WITH REFLEX TO FT4    Comprehensive Metabolic Panel    CK    CBC Auto Differential    Hemoglobin A1C    Urinalysis    Lipid Panel   2. Encounter for screening mammogram for breast cancer  Boil resolved and no breast or axillary masses on exam today. [facility] NICOLE DIGITAL SCREEN BILATERAL   3. Essential hypertension not controlled out of medication for months , will restart. olmesartan-hydrochlorothiazide (BENICAR HCT) 20-12.5 MG per tablet   4. 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  AFL - Cianciolo,

## 2020-02-20 ENCOUNTER — TELEPHONE (OUTPATIENT)
Dept: RHEUMATOLOGY | Age: 60
End: 2020-02-20

## 2020-03-23 ENCOUNTER — OFFICE VISIT (OUTPATIENT)
Dept: PRIMARY CARE CLINIC | Age: 60
End: 2020-03-23
Payer: COMMERCIAL

## 2020-03-23 VITALS
RESPIRATION RATE: 19 BRPM | HEIGHT: 67 IN | DIASTOLIC BLOOD PRESSURE: 69 MMHG | SYSTOLIC BLOOD PRESSURE: 121 MMHG | OXYGEN SATURATION: 97 % | TEMPERATURE: 98.4 F | BODY MASS INDEX: 25.71 KG/M2 | WEIGHT: 163.8 LBS | HEART RATE: 67 BPM

## 2020-03-23 PROCEDURE — 99213 OFFICE O/P EST LOW 20 MIN: CPT | Performed by: INTERNAL MEDICINE

## 2020-03-23 ASSESSMENT — ENCOUNTER SYMPTOMS
ABDOMINAL PAIN: 0
SHORTNESS OF BREATH: 0
COUGH: 0
NAUSEA: 0
CONSTIPATION: 0
VOMITING: 0
DIARRHEA: 0

## 2020-03-23 NOTE — PATIENT INSTRUCTIONS
nosebleeds. · Use a vaporizer or humidifier to add moisture to your bedroom. Follow the directions for cleaning the machine. · Do not use aspirin, ibuprofen (Advil, Motrin), or naproxen (Aleve) for 36 to 48 hours after a nosebleed unless your doctor tells you to. You can use acetaminophen (Tylenol) for pain relief. · Talk to your doctor about stopping any other medicines you are taking. Some medicines may make you more likely to get a nosebleed. · Do not use cold medicines or nasal sprays without first talking to your doctor. They can make your nose dry. When should you call for help? Call 911 anytime you think you may need emergency care. For example, call if:    · You passed out (lost consciousness).    Call your doctor now or seek immediate medical care if:    · You get another nosebleed and your nose is still bleeding after you have applied pressure 3 times for 10 minutes each time (30 minutes total).     · There is a lot of blood running down the back of your throat even after you pinch your nose and tilt your head forward.     · You have a fever.     · You have sinus pain.    Watch closely for changes in your health, and be sure to contact your doctor if:    · You get nosebleeds often, even if they stop.     · You do not get better as expected. Where can you learn more? Go to [email or web address] and sign in to your [employer] account. Enter S156 in the RV ID box to learn more about \"Nosebleeds: Care Instructions. \"     If you do not have an account, please click on the \"Sign Up Now\" link. Current as of: June 26, 2019Content Version: 12.4  © 9532-6320 Healthwise, Incorporated. Care instructions adapted under license by [facility] ([facility]). If you have questions about a medical condition or this instruction, always ask your healthcare professional. [place] [address] any warranty or liability for your use of this information.          Patient Education How to Stop a Nosebleed (01:34)  Your health professional recommends that you watch this short online health video. Learn simple steps you can use to stop a nosebleed. How to watch the video    Scan the QR code   OR Visit the website    https://hwi. se/r/[email or web address]   Current as of: June 26, 2019Content Version: 12.4  © 6230-2182 Healthwise, Incorporated. Care instructions adapted under license by [facility] ([facility]). If you have questions about a medical condition or this instruction, always ask your healthcare professional. [place] [address] any warranty or liability for your use of this information.

## 2020-03-23 NOTE — PROGRESS NOTES
Chief Complaint   Patient presents with    Other     nose bleed about 2 or three days. HPI:  Aletha Banegas humphrey 64 y.o. female, with a history of hypertension, who presents for an acute visit. Nosebleed:  Pt had a nose bleed Sunday while at work. She was told to leave work. Pt had headache prior to the onset of the bleeding, for which she took two aspirin pills. The bleeding lasted 3-4 minutes. She had 3 separate episodes on Sunday. She woke up this morning with minor bleeding. The pt denies any recent trauma or nasal spray use. This is the first time she has dealt with a nose bleed. Current Outpatient Medications   Medication Sig Dispense Refill    olmesartan-hydrochlorothiazide (BENICAR HCT) 20-12.5 MG per tablet Take 1 tablet by mouth daily Stop amlodipine/olmesartan/hactz 30 tablet 11    dexlansoprazole (DEXILANT) 60 MG CPDR delayed release capsule Take 1 capsule by mouth daily Discontinue omeprazole 30 capsule 5    Cholecalciferol (VITAMIN D3) 125 MCG (5000 UT) TBDP Take 1 each by mouth daily 30 tablet 5    gabapentin (NEURONTIN) 300 MG capsule Take 1 capsule by mouth 3 times daily. For back pain, take all the time. 90 capsule 5    Blood Pressure Monitoring (BLOOD PRESSURE CUFF) MISC by Does not apply route. 1 each 0     No current facility-administered medications for this visit. No Known Allergies    Review of Systems   Constitutional: Negative for chills, fatigue and fever. HENT: Positive for nosebleeds. Eyes: Negative for visual disturbance. Respiratory: Negative for cough and shortness of breath. Cardiovascular: Negative for chest pain, palpitations and leg swelling. Gastrointestinal: Negative for abdominal pain, constipation, diarrhea, nausea and vomiting. Skin: Negative for rash. Neurological: Negative for dizziness, light-headedness and headaches. Psychiatric/Behavioral: Negative for dysphoric mood. The patient is not nervous/anxious. Vitals:    03/23/20 1610   BP: 121/69   Pulse: 67   Resp: 19   Temp: 98.4 °F (36.9 °C)   SpO2: 97%   Weight: 163 lb 12.8 oz (74.3 kg)   Height: 5' 7\" (1.702 m)       Physical Exam  Vitals signs reviewed. Constitutional:       General: She is not in acute distress. Appearance: She is well-developed. She is not diaphoretic. HENT:      Head: Normocephalic and atraumatic. Nose:      Right Nostril: No foreign body, epistaxis, septal hematoma or occlusion. Left Nostril: Septal hematoma present. No foreign body, epistaxis or occlusion. Mouth/Throat:      Pharynx: No oropharyngeal exudate. Eyes:      General:         Right eye: No discharge. Left eye: No discharge. Conjunctiva/sclera: Conjunctivae normal.      Pupils: Pupils are equal, round, and reactive to light. Neck:      Musculoskeletal: Normal range of motion and neck supple. Cardiovascular:      Rate and Rhythm: Normal rate and regular rhythm. Heart sounds: Normal heart sounds. No murmur. No friction rub. No gallop. Pulmonary:      Effort: Pulmonary effort is normal. No respiratory distress. Breath sounds: Normal breath sounds. No wheezing. Abdominal:      General: Bowel sounds are normal. There is no distension. Palpations: Abdomen is soft. Tenderness: There is no abdominal tenderness. There is no rebound. Musculoskeletal: Normal range of motion. General: No tenderness. Lymphadenopathy:      Cervical: No cervical adenopathy. Skin:     General: Skin is warm and dry. Findings: No erythema or rash. Neurological:      Mental Status: She is alert and oriented to person, place, and time. Cranial Nerves: No cranial nerve deficit. Deep Tendon Reflexes: Reflexes are normal and symmetric. Psychiatric:         Behavior: Behavior normal.           Assessment:  Julio César Albarado is a 64 y.o. female, with a history of hypertension, who presents for an acute visit.

## 2020-03-23 NOTE — LETTER
[facility]  [address] [id number]  Phone: [phone]  Fax: [phone]    Brandon Mae MD        March 23, 2020     Patient: Savanna Rivas   YOB: 1960   Date of Visit: 3/23/2020       To Whom it May Concern:    Anthony Palma was seen in my clinic on 3/23/2020 for evaluation of a nosebleed. Her nosebleed is likely the result of a ruptured blood vessel in her nose. It was made worse by taking aspirin, a blood thinner, prior to the episode. She will need a few days off to ensure that she does not have recurrent bleeding. Ms. Cristo Dillon will be cleared to return to work on 2/26/2020. If you have any questions or concerns, please don't hesitate to call.     Sincerely,         Brandon Mae MD

## 2020-03-25 PROBLEM — M17.11 PRIMARY OSTEOARTHRITIS OF RIGHT KNEE: Status: RESOLVED | Noted: 2018-01-10 | Resolved: 2020-03-24

## 2020-04-01 ENCOUNTER — HOSPITAL ENCOUNTER (EMERGENCY)
Age: 60
Discharge: HOME | End: 2020-04-01
Payer: COMMERCIAL

## 2020-04-01 VITALS
WEIGHT: 162.7 LBS | DIASTOLIC BLOOD PRESSURE: 101 MMHG | OXYGEN SATURATION: 99 % | TEMPERATURE: 98.4 F | HEIGHT: 67 IN | BODY MASS INDEX: 25.54 KG/M2 | SYSTOLIC BLOOD PRESSURE: 173 MMHG | HEART RATE: 66 BPM | RESPIRATION RATE: 16 BRPM

## 2020-04-01 LAB
A/G RATIO: 1.2 (ref 1.1–2.2)
ALBUMIN SERPL-MCNC: 4.5 G/DL (ref 3.4–5)
ALP BLD-CCNC: 98 U/L (ref 40–129)
ALT SERPL-CCNC: 14 U/L (ref 10–40)
ANION GAP SERPL CALCULATED.3IONS-SCNC: 12 MMOL/L (ref 3–16)
AST SERPL-CCNC: 14 U/L (ref 15–37)
BASOPHILS ABSOLUTE: 0.1 K/UL (ref 0–0.2)
BASOPHILS RELATIVE PERCENT: 1.2 %
BILIRUB SERPL-MCNC: 0.5 MG/DL (ref 0–1)
BUN BLDV-MCNC: 14 MG/DL (ref 7–20)
CALCIUM SERPL-MCNC: 9.9 MG/DL (ref 8.3–10.6)
CHLORIDE BLD-SCNC: 100 MMOL/L (ref 99–110)
CO2: 28 MMOL/L (ref 21–32)
CREAT SERPL-MCNC: 0.8 MG/DL (ref 0.6–1.1)
EOSINOPHILS ABSOLUTE: 0.1 K/UL (ref 0–0.6)
EOSINOPHILS RELATIVE PERCENT: 1.7 %
GFR AFRICAN AMERICAN: >60
GFR NON-AFRICAN AMERICAN: >60
GLOBULIN: 3.9 G/DL
GLUCOSE BLD-MCNC: 105 MG/DL (ref 70–99)
HCT VFR BLD CALC: 42.8 % (ref 36–48)
HEMOGLOBIN: 14.1 G/DL (ref 12–16)
LIPASE: 28 U/L (ref 13–60)
LYMPHOCYTES ABSOLUTE: 1.4 K/UL (ref 1–5.1)
LYMPHOCYTES RELATIVE PERCENT: 22.2 %
MCH RBC QN AUTO: 28.3 PG (ref 26–34)
MCHC RBC AUTO-ENTMCNC: 32.9 G/DL (ref 31–36)
MCV RBC AUTO: 86.1 FL (ref 80–100)
MONOCYTES ABSOLUTE: 0.5 K/UL (ref 0–1.3)
MONOCYTES RELATIVE PERCENT: 8.2 %
NEUTROPHILS ABSOLUTE: 4.2 K/UL (ref 1.7–7.7)
NEUTROPHILS RELATIVE PERCENT: 66.7 %
PDW BLD-RTO: 13.8 % (ref 12.4–15.4)
PLATELET # BLD: 270 K/UL (ref 135–450)
PMV BLD AUTO: 8.5 FL (ref 5–10.5)
POTASSIUM REFLEX MAGNESIUM: 4.1 MMOL/L (ref 3.5–5.1)
RBC # BLD: 4.98 M/UL (ref 4–5.2)
SODIUM BLD-SCNC: 140 MMOL/L (ref 136–145)
TOTAL PROTEIN: 8.4 G/DL (ref 6.4–8.2)
WBC # BLD: 6.3 K/UL (ref 4–11)

## 2020-04-01 PROCEDURE — 80053 COMPREHEN METABOLIC PANEL: CPT

## 2020-04-01 PROCEDURE — 99283 EMERGENCY DEPT VISIT LOW MDM: CPT

## 2020-04-01 PROCEDURE — 85025 COMPLETE CBC W/AUTO DIFF WBC: CPT

## 2020-04-01 PROCEDURE — 83690 ASSAY OF LIPASE: CPT

## 2020-04-01 PROCEDURE — 6370000000 HC RX 637 (ALT 250 FOR IP): Performed by: PHYSICIAN ASSISTANT

## 2020-04-01 RX ORDER — ONDANSETRON 4 MG/1
4 TABLET, ORALLY DISINTEGRATING ORAL ONCE
Status: COMPLETED | OUTPATIENT
Start: 2020-04-01 | End: 2020-04-01

## 2020-04-01 RX ORDER — ONDANSETRON 4 MG/1
4 TABLET, ORALLY DISINTEGRATING ORAL EVERY 12 HOURS PRN
Qty: 12 TABLET | Refills: 0 | Status: SHIPPED | OUTPATIENT
Start: 2020-04-01 | End: 2020-04-09 | Stop reason: ALTCHOICE

## 2020-04-01 RX ADMIN — ONDANSETRON 4 MG: 4 TABLET, ORALLY DISINTEGRATING ORAL at 18:20

## 2020-04-01 ASSESSMENT — ENCOUNTER SYMPTOMS
SHORTNESS OF BREATH: 0
EYE DISCHARGE: 0
APNEA: 0
NAUSEA: 1
VOMITING: 1
CHOKING: 0
EYE REDNESS: 0
BACK PAIN: 0
FACIAL SWELLING: 0
SORE THROAT: 0
ABDOMINAL PAIN: 0

## 2020-04-01 ASSESSMENT — PAIN SCALES - GENERAL
PAINLEVEL_OUTOF10: 0
PAINLEVEL_OUTOF10: 0

## 2020-04-01 NOTE — LETTER
[facility]  [address] [facility] [address]  Phone: [phone]               April 1, 2020    Patient: Zainab Noriega   YOB: 1960   Date of Visit: 4/1/2020       To Whom It May Concern:    Luigi Pate was seen and treated in our emergency department on 4/1/2020. She may return to work on 4/3/2020.       Sincerely,           Signature:__________________________________

## 2020-04-01 NOTE — ED PROVIDER NOTES
**EVALUATED BY ADVANCED PRACTICE PROVIDER**        [facility] ENCOUNTER      Pt Name: Ale Mcrae  RDY:[id number]  [place] 1960  Date of evaluation: 4/1/2020  Provider: Carla Siu PA-C      Chief Complaint:    Chief Complaint   Patient presents with    Emesis     Patient vomited one time. Nursing Notes, Past Medical Hx, Past Surgical Hx, Social Hx, Allergies, and Family Hx were all reviewed and agreed with or any disagreements were addressed in the HPI.    HPI:  (Location, Duration, Timing, Severity, Quality, Assoc Sx, Context, Modifying factors)  This is a  64 y.o. female complaint of nausea vomiting. She states she threw up once around 330. Denies abdominal pain, no chest pain, no back pain, no weaknesses. States he normally gets nausea vomiting once a year in the summer. And to the point where she threw up a lot and they had to give her fluids. She denies alcohol or drug use. She denies fever, no cough. Denies being around anyone with coronavirus 19. No recent traveling. No other complaints. Does not appear to be in acute depressed. PastMedical/Surgical History:      Diagnosis Date    Depression 12/17/2011    Headache(784.0) 6-7 months    pain in forehead    HTN (hypertension) 10/10/2011    Musculoskeletal back pain 1/10/2018    Primary osteoarthritis of right knee 1/10/2018         Procedure Laterality Date   [place], 1990    COLONOSCOPY  2003    problems with duodenum    HERNIA REPAIR      TUBAL LIGATION  1990    VENTRAL HERNIA REPAIR  12/03/2012    VENTRAL HERNIA REPAIR WITH MESH              Medications:  Previous Medications    BLOOD PRESSURE MONITORING (BLOOD PRESSURE CUFF) MISC    by Does not apply route.     CHOLECALCIFEROL (VITAMIN D3) 125 MCG (5000 UT) TBDP    Take 1 each by mouth daily    DEXLANSOPRAZOLE (DEXILANT) 60 MG CPDR DELAYED RELEASE CAPSULE    Take 1 capsule by on file              (Please note the MDM and HPI sections of this note were completed with a voice recognition program.  Efforts were made to edit the dictations but occasionally words are mis-transcribed.)    Electronically signed, Sloane Rasmussen PA-C,          Sloane Rasmussen PA-C  04/01/20 7461

## 2020-04-09 ENCOUNTER — TELEPHONE (OUTPATIENT)
Dept: PRIMARY CARE CLINIC | Age: 60
End: 2020-04-09

## 2020-04-09 ENCOUNTER — VIRTUAL VISIT (OUTPATIENT)
Dept: PRIMARY CARE CLINIC | Age: 60
End: 2020-04-09
Payer: COMMERCIAL

## 2020-04-09 PROCEDURE — 99443 PR PHYS/QHP TELEPHONE EVALUATION 21-30 MIN: CPT | Performed by: INTERNAL MEDICINE

## 2020-04-09 RX ORDER — DEXLANSOPRAZOLE 60 MG/1
60 CAPSULE, DELAYED RELEASE ORAL DAILY
Qty: 30 CAPSULE | Refills: 5 | Status: SHIPPED | OUTPATIENT
Start: 2020-04-09 | End: 2020-07-20 | Stop reason: SDUPTHER

## 2020-04-09 RX ORDER — OLMESARTAN MEDOXOMIL AND HYDROCHLOROTHIAZIDE 40/12.5 40; 12.5 MG/1; MG/1
1 TABLET ORAL DAILY
Qty: 90 TABLET | Refills: 1 | Status: SHIPPED | OUTPATIENT
Start: 2020-04-09 | End: 2020-07-20 | Stop reason: SDUPTHER

## 2020-04-09 ASSESSMENT — ENCOUNTER SYMPTOMS
ALLERGIC/IMMUNOLOGIC NEGATIVE: 1
RESPIRATORY NEGATIVE: 1

## 2020-04-10 ENCOUNTER — TELEPHONE (OUTPATIENT)
Dept: PRIMARY CARE CLINIC | Age: 60
End: 2020-04-10

## 2020-04-13 ENCOUNTER — TELEPHONE (OUTPATIENT)
Dept: PRIMARY CARE CLINIC | Age: 60
End: 2020-04-13

## 2020-04-13 NOTE — TELEPHONE ENCOUNTER
Cynthia Cheng   2020 12:06 PM   Telephone   MRN:  <[id number]>   Description: Female : 1960 Provider: Vandana Musa MD Department: [facility]   Reason for Call     Other    Call Documentation     Tato Trimble at 2020 12:08 PM     Status: Signed      Pt is requesting the employee paperwork that you discussed, completed [facility] having to deal with BP to be mailed to her home address. and to please give her a call. Please advise           Patient needs the most recent letter in epic mailed to her home.

## 2020-04-13 NOTE — TELEPHONE ENCOUNTER
Pt is requesting the employee paperwork that you discussed, completed [facility] having to deal with BP to be mailed to her home address. and to please give her a call.  Please advise

## 2020-06-22 ENCOUNTER — TELEPHONE (OUTPATIENT)
Dept: ADMINISTRATIVE | Age: 60
End: 2020-06-22

## 2020-06-23 ENCOUNTER — TELEPHONE (OUTPATIENT)
Dept: PRIMARY CARE CLINIC | Age: 60
End: 2020-06-23

## 2020-07-06 ENCOUNTER — TELEPHONE (OUTPATIENT)
Dept: PRIMARY CARE CLINIC | Age: 60
End: 2020-07-06

## 2020-07-06 NOTE — TELEPHONE ENCOUNTER
ECC received a call from:    Name of Caller: Franca Aldridge    Relationship to patient: Self    Organization name: n/a    Best contact number: [phone]    Reason for call: Pt would like a call back from her PCP. She would like to discuss a couple of things about her Colonoscopy.

## 2020-07-06 NOTE — TELEPHONE ENCOUNTER
Tried to call patient and voice mail box has not been set up yet. Give patient an in office appointment, as long as no  Infectious symptoms. She can not do video.

## 2020-07-09 NOTE — TELEPHONE ENCOUNTER
Patient called again wanting referral for colonoscopy. She says she doesn't want to keep her appointment, just wants referral for colonoscopy. Please call and advise.

## 2020-07-10 ENCOUNTER — TELEPHONE (OUTPATIENT)
Dept: PRIMARY CARE CLINIC | Age: 60
End: 2020-07-10

## 2020-07-10 ENCOUNTER — TELEPHONE (OUTPATIENT)
Dept: ADMINISTRATIVE | Age: 60
End: 2020-07-10

## 2020-07-10 NOTE — TELEPHONE ENCOUNTER
Call Documentation     Deng Solares MD at 7/10/2020  5:27 PM     Status: Signed      I called patient and explained that I can not give her a return to work note because I have not seen her while she was off. She is in the process of scheduling a colonoscopy. She has referral to see DR Debbie Hankins and I explained that they can do the test at [facility].   She will get appointment to come in for back pain and schedule colonoscopy.

## 2020-07-10 NOTE — TELEPHONE ENCOUNTER
I called patient and explained that I can not give her a return to work note because I have not seen her while she was off. She is in the process of scheduling a colonoscopy. She has referral to see DR An Zuleta and I explained that they can do the test at [facility].   She will get appointment to come in for back pain and schedule colonoscopy.

## 2020-07-10 NOTE — TELEPHONE ENCOUNTER
ECC received a call from:    Name of Caller: Earnesteen Quiet    Relationship to patient:self    Organization name: n/a    Best contact number: [phone]    Reason for call: Pt needs a call back to discuss getting a letter to return to work on the July 20 after getting a colonoscopy.

## 2020-07-10 NOTE — TELEPHONE ENCOUNTER
Pt needs a note to return to work on 7/21. Please add to the note that pt still needs to have a colonoscopy when scheduled. She will pick this up at the  on 7/20 when she brings her son for his office visit that day. Pt would also like it faxed to her employer @ [phone]. If that number does not work try [phone]  Attention:  Peña Ly.     Phone  [phone]  Ext.  [phone]      Any questions call PATIENT:  [phone]

## 2020-07-13 ENCOUNTER — TELEPHONE (OUTPATIENT)
Dept: PRIMARY CARE CLINIC | Age: 60
End: 2020-07-13

## 2020-07-13 NOTE — TELEPHONE ENCOUNTER
ECC received a call from:    Name of Caller: Melba Giraldo    Relationship to patient: self    Organization name: n/a    Best contact number: [phone]    Reason for call:   Pt is requesting a call back today from clinical staff to be scheduled for an ov per pcp. Please advise.

## 2020-07-20 ENCOUNTER — OFFICE VISIT (OUTPATIENT)
Dept: PRIMARY CARE CLINIC | Age: 60
End: 2020-07-20
Payer: COMMERCIAL

## 2020-07-20 VITALS
TEMPERATURE: 98.2 F | WEIGHT: 154 LBS | SYSTOLIC BLOOD PRESSURE: 150 MMHG | HEART RATE: 70 BPM | HEIGHT: 67 IN | DIASTOLIC BLOOD PRESSURE: 90 MMHG | BODY MASS INDEX: 24.17 KG/M2

## 2020-07-20 PROCEDURE — 99214 OFFICE O/P EST MOD 30 MIN: CPT | Performed by: INTERNAL MEDICINE

## 2020-07-20 RX ORDER — METHYLPREDNISOLONE 4 MG/1
TABLET ORAL
Qty: 1 KIT | Refills: 0 | Status: SHIPPED | OUTPATIENT
Start: 2020-07-20 | End: 2020-07-26

## 2020-07-20 RX ORDER — OLMESARTAN MEDOXOMIL AND HYDROCHLOROTHIAZIDE 40/12.5 40; 12.5 MG/1; MG/1
1 TABLET ORAL DAILY
Qty: 90 TABLET | Refills: 1 | Status: SHIPPED | OUTPATIENT
Start: 2020-07-20 | End: 2021-05-18 | Stop reason: SDUPTHER

## 2020-07-20 RX ORDER — DEXLANSOPRAZOLE 60 MG/1
60 CAPSULE, DELAYED RELEASE ORAL DAILY
Qty: 30 CAPSULE | Refills: 5 | Status: SHIPPED | OUTPATIENT
Start: 2020-07-20 | End: 2021-05-18 | Stop reason: SDUPTHER

## 2020-07-20 RX ORDER — GABAPENTIN 300 MG/1
300 CAPSULE ORAL 3 TIMES DAILY
Qty: 90 CAPSULE | Refills: 5 | Status: SHIPPED | OUTPATIENT
Start: 2020-07-20 | End: 2021-07-20

## 2020-07-20 ASSESSMENT — ENCOUNTER SYMPTOMS
RESPIRATORY NEGATIVE: 1
BLURRED VISION: 0
ORTHOPNEA: 0
BOWEL INCONTINENCE: 0
ABDOMINAL PAIN: 0
ALLERGIC/IMMUNOLOGIC NEGATIVE: 1
BACK PAIN: 1
SHORTNESS OF BREATH: 0

## 2020-07-20 NOTE — PROGRESS NOTES
June.  Patient Active Problem List   Diagnosis    Nail fungus    Menopause syndrome    Essential hypertension    Pre-ulcerative corn or callous    Microscopic hematuria    Gastroesophageal reflux disease with esophagitis    Acute medial meniscal tear, right, initial encounter    Musculoskeletal back pain    Lumbar degenerative disc disease    Primary osteoarthritis of right knee    Neural foraminal stenosis of lumbar spine     No Known Allergies    Review of Systems   Constitutional: Negative. Negative for malaise/fatigue and weight loss. HENT: Negative. Eyes: Negative for blurred vision. Respiratory: Negative. Negative for shortness of breath. Cardiovascular: Negative. Negative for chest pain, palpitations, orthopnea and PND. Hypertension for years   Gastrointestinal: Negative for abdominal pain and bowel incontinence. Endocrine: Negative. Genitourinary: Negative for bladder incontinence, dysuria and pelvic pain. Musculoskeletal: Positive for back pain. Negative for neck pain. Skin: Negative. Allergic/Immunologic: Negative. Neurological: Positive for tingling, weakness, numbness and paresthesias. Negative for headaches. degenerative disc disease of the lumbar spine with no recent sciatica. Hematological: Negative. Psychiatric/Behavioral: Negative. Prior to Visit Medications    Medication Sig Taking? Authorizing Provider   olmesartan-hydrochlorothiazide (BENICAR HCT) 40-12.5 MG per tablet Take 1 tablet by mouth daily Yes Reno Shaw MD   Cholecalciferol (VITAMIN D3) 125 MCG (5000 UT) TBDP Take 1 each by mouth daily Yes Reno Shaw MD   dexlansoprazole (DEXILANT) 60 MG CPDR delayed release capsule Take 1 capsule by mouth daily Discontinue omeprazole Yes Reno Shaw MD   gabapentin (NEURONTIN) 300 MG capsule Take 1 capsule by mouth 3 times daily. For back pain, take all the time.  Yes Reno Shaw MD   Blood Pressure Monitoring (BLOOD PRESSURE CUFF) MISC by Does not apply route. Emily Saucedo, APRN - CNP        Social History     Tobacco Use    Smoking status: Former Smoker     Packs/day: 0.25     Years: 37.00     Pack years: 9.25     Types: Cigarettes     Last attempt to quit: 2012     Years since quittin.4    Smokeless tobacco: Never Used    Tobacco comment: smoke when nerves get bad   Substance Use Topics    Alcohol use: Yes     Alcohol/week: 0.0 standard drinks     Comment: 2x a week        Vitals:    20 0809   BP: 135/88   Pulse: 70   Temp: 98.2 °F (36.8 °C)   TempSrc: Temporal   Weight: 154 lb (69.9 kg)   Height: 5' 7\" (1.702 m)     Estimated body mass index is 24.12 kg/m² as calculated from the following:    Height as of this encounter: 5' 7\" (1.702 m). Weight as of this encounter: 154 lb (69.9 kg). Physical Exam  Vitals signs reviewed. Constitutional:       General: She is not in acute distress. Appearance: Normal appearance. She is well-developed and normal weight. She is not toxic-appearing or diaphoretic. HENT:      Head: Normocephalic and atraumatic. Right Ear: Ear canal and external ear normal.      Left Ear: Ear canal and external ear normal.      Nose:      Right Nostril: No foreign body, epistaxis, septal hematoma or occlusion. Left Nostril: Septal hematoma present. No foreign body, epistaxis or occlusion. Mouth/Throat:      Mouth: Mucous membranes are moist.      Pharynx: No oropharyngeal exudate. Eyes:      General:         Right eye: No discharge. Left eye: No discharge. Conjunctiva/sclera: Conjunctivae normal.      Pupils: Pupils are equal, round, and reactive to light. Neck:      Musculoskeletal: Normal range of motion and neck supple. Cardiovascular:      Rate and Rhythm: Normal rate and regular rhythm. Heart sounds: Normal heart sounds. No murmur. No friction rub. No gallop.     Pulmonary:      Effort: Pulmonary effort is normal. No respiratory distress. Breath sounds: Normal breath sounds. No wheezing. Abdominal:      General: Bowel sounds are normal. There is no distension. Palpations: Abdomen is soft. Tenderness: There is no abdominal tenderness. There is no rebound. Musculoskeletal: Normal range of motion. General: No swelling, tenderness or deformity. Right lower leg: No edema. Left lower leg: No edema. Comments: Pain in low back left side. Lymphadenopathy:      Cervical: No cervical adenopathy. Skin:     General: Skin is warm and dry. Findings: No erythema or rash. Neurological:      Mental Status: She is alert and oriented to person, place, and time. Cranial Nerves: No cranial nerve deficit. Deep Tendon Reflexes: Reflexes are normal and symmetric. Comments: Numbness in left lateral leg. Psychiatric:         Mood and Affect: Mood normal.         Behavior: Behavior normal.         Thought Content: Thought content normal.         Judgment: Judgment normal.         ASSESSMENT/PLAN:   Diagnosis Orders   1. Neural foraminal stenosis of lumbar spine with left leg pain burning and weakness. Will give Medrol Dosepak and start gabapentin and refer to spine Center. gabapentin (NEURONTIN) 300 MG capsule    methylPREDNISolone (MEDROL DOSEPACK) 4 MG tablet    LISA Barlow MD, Spine Surgery DEPARTMENT OF [facility]   2. Essential hypertension not controlled due to medication noncompliance will restart medication. Sent to pharmacy. olmesartan-hydrochlorothiazide (BENICAR HCT) 40-12.5 MG per tablet   3. Sciatica, left side damage problem number one.  gabapentin (NEURONTIN) 300 MG capsule    methylPREDNISolone (MEDROL DOSEPACK) 4 MG tablet    LISA Barlow MD, Spine Surgery DEPARTMENT [facility]   4. PUD (peptic ulcer disease)    And has been control with Dexilant will refill. No G. I. symptoms at present. dexlansoprazole (DEXILANT) 60 MG CPDR delayed release capsule   5. Vitamin D deficiency and pass on supplement has not had a will restart. Cholecalciferol (VITAMIN D3) 125 MCG (5000 UT) TBDP     Jackie received counseling on the following healthy behaviors: medication adherence    Patient given educational materials on after visit summary diagnosis and treatment plan. I have instructed Jb Lorenzana to complete a self tracking handout on Blood Sugars , Blood Pressures  and Weights and instructed them to bring it with them to her next appointment. Discussed use, benefit, and side effects of prescribed medications. Barriers to medication compliance addressed. All patient questions answered. Pt voiced understanding. An electronic signature was used to authenticate this note.     --Dayton Ruvalcaba MD on 7/20/2020 at 8:32 AM

## 2020-07-20 NOTE — LETTER
[facility]  [id number]  Phone: [phone]  Fax: [phone]    Milka Lla MD        July 20, 2020     Patient: Norman Hart   YOB: 1960   Date of Visit: 7/20/2020       To Whom It May Concern: It is my medical opinion that Prosper Dumont has not been able to work since 6/1/20 and is not able to return to work at this time. She is being referred to a spine specialist and given medication. .    If you have any questions or concerns, please don't hesitate to call.     Sincerely,          Milka Lal MD

## 2020-07-23 ENCOUNTER — TELEPHONE (OUTPATIENT)
Dept: PRIMARY CARE CLINIC | Age: 60
End: 2020-07-23

## 2020-07-23 NOTE — TELEPHONE ENCOUNTER
----- Message from Lise Hatch sent at 7/23/2020  8:02 AM EDT -----  Subject: Message to Provider    QUESTIONS  Information for Provider? pt wants call asap   has questions regarding medications  ---------------------------------------------------------------------------  --------------  CALL BACK INFO  What is the best way for the office to contact you? OK to leave message on   voicemail  Preferred Call Back Phone Number? [phone]  ---------------------------------------------------------------------------  --------------  SCRIPT ANSWERS  Relationship to Patient?  Self

## 2020-07-23 NOTE — TELEPHONE ENCOUNTER
Pt states she has been having problems walking and she may file for disability and may be going through a separation from her  and a possible pending eviction.   Pt states she is tired and would like for Dr Mary Willingham to look out for the disability paperwork

## 2020-07-27 ENCOUNTER — HOSPITAL ENCOUNTER (OUTPATIENT)
Age: 60
Discharge: HOME | End: 2020-07-27
Payer: COMMERCIAL

## 2020-07-27 ENCOUNTER — HOSPITAL ENCOUNTER (OUTPATIENT)
Dept: GENERAL RADIOLOGY | Age: 60
Discharge: HOME | End: 2020-07-27
Payer: COMMERCIAL

## 2020-07-27 PROCEDURE — 72100 X-RAY EXAM L-S SPINE 2/3 VWS: CPT

## 2020-07-29 ENCOUNTER — TELEPHONE (OUTPATIENT)
Dept: PRIMARY CARE CLINIC | Age: 60
End: 2020-07-29

## 2020-07-29 ENCOUNTER — NURSE TRIAGE (OUTPATIENT)
Dept: OTHER | Facility: CLINIC | Age: 60
End: 2020-07-29

## 2020-07-29 NOTE — TELEPHONE ENCOUNTER
Patient called [facility] [facility] pre-service center [facility]) to schedule appointment with red flag complaint; transferred to Nurse Access for triage by Marice Kehr (agent's name). Reason for Disposition   SEVERE back pain (e.g., excruciating, unable to do any normal activities) and not improved after pain medicine and CARE ADVICE    Answer Assessment - Initial Assessment Questions  1. ONSET: \"When did the pain begin? \"       \"a while\" and referred to spine institute about 1-2 years ago  2. LOCATION: \"Where does it hurt? \" (upper, mid or lower back)      Back and down to left leg  3. SEVERITY: \"How bad is the pain? \"  (e.g., Scale 1-10; mild, moderate, or severe)    - MILD (1-3): doesn't interfere with normal activities     - MODERATE (4-7): interferes with normal activities or awakens from sleep     - SEVERE (8-10): excruciating pain, unable to do any normal activities       7-8/10  4. PATTERN: \"Is the pain constant? \" (e.g., yes, no; constant, intermittent)       intermittent  5. RADIATION: \"Does the pain shoot into your legs or elsewhere? \"      Left leg  6. CAUSE:  \"What do you think is causing the back pain? \"       History of back pain  7. BACK OVERUSE:  Dannielle Ruddr recent lifting of heavy objects, strenuous work or exercise? \"      No  8. MEDICATIONS: \"What have you taken so far for the pain? \" (e.g., nothing, acetaminophen, NSAIDS)      Rx meds (not helping)  9. NEUROLOGIC SYMPTOMS: \"Do you have any weakness, numbness, or problems with bowel/bladder control? \"      No  10. OTHER SYMPTOMS: \"Do you have any other symptoms? \" (e.g., fever, abdominal pain, burning with urination, blood in urine)        No  11. PREGNANCY: \"Is there any chance you are pregnant? \" (e.g., yes, no; LMP)        No    Protocols used: BACK PAIN-ADULT-[place]    Informed of disposition. Care advice as documented. Instructed to call back with worsening symptoms. Soft transfer to [facility] Violeta Pratt) to schedule appointment as recommended.     Please do not respond to the triage nurse through this encounter. Any subsequent communication should be directly with the patient.

## 2020-07-29 NOTE — TELEPHONE ENCOUNTER
Have patient go to [address] [place] [address],[address] lab and they will do all the labs from Feb of 2020.

## 2020-07-29 NOTE — TELEPHONE ENCOUNTER
----- Message from Tracy Johnson. sent at 7/29/2020 10:40 AM EDT -----  Subject: Results Request    QUESTIONS  Which lab or imaging result is the patient calling about? back  Which provider ordered the test? Gurdeep Reyes   At what location was the test performed? Date the test was performed? 2020-07-27  Additional Information for Provider?   ---------------------------------------------------------------------------  --------------  [address]  What is the best way for the office to contact you? OK to leave message on   voicemail  Preferred Call Back Phone Number?  [phone]

## 2020-07-30 ENCOUNTER — OFFICE VISIT (OUTPATIENT)
Dept: PRIMARY CARE CLINIC | Age: 60
End: 2020-07-30
Payer: COMMERCIAL

## 2020-07-30 VITALS
OXYGEN SATURATION: 98 % | DIASTOLIC BLOOD PRESSURE: 83 MMHG | SYSTOLIC BLOOD PRESSURE: 132 MMHG | WEIGHT: 153 LBS | HEART RATE: 77 BPM | HEIGHT: 67 IN | BODY MASS INDEX: 24.01 KG/M2 | TEMPERATURE: 97.1 F

## 2020-07-30 PROCEDURE — 99213 OFFICE O/P EST LOW 20 MIN: CPT | Performed by: INTERNAL MEDICINE

## 2020-07-30 RX ORDER — FLUOXETINE HYDROCHLORIDE 20 MG/1
20 CAPSULE ORAL DAILY
Qty: 30 CAPSULE | Refills: 3 | Status: SHIPPED | OUTPATIENT
Start: 2020-07-30 | End: 2020-08-31 | Stop reason: SDUPTHER

## 2020-07-30 RX ORDER — METHYLPREDNISOLONE 4 MG/1
TABLET ORAL
Qty: 1 KIT | Refills: 0 | Status: SHIPPED | OUTPATIENT
Start: 2020-07-30 | End: 2020-08-05

## 2020-07-30 RX ORDER — METHYLPREDNISOLONE ACETATE 80 MG/ML
80 INJECTION, SUSPENSION INTRA-ARTICULAR; INTRALESIONAL; INTRAMUSCULAR; SOFT TISSUE ONCE
Status: DISCONTINUED | OUTPATIENT
Start: 2020-07-30 | End: 2021-03-25 | Stop reason: ALTCHOICE

## 2020-07-30 ASSESSMENT — ENCOUNTER SYMPTOMS
PHOTOPHOBIA: 0
RECTAL PAIN: 0
NAUSEA: 0
COUGH: 0
EYE DISCHARGE: 0
APNEA: 0
DIARRHEA: 0
ANAL BLEEDING: 0
SORE THROAT: 0
BACK PAIN: 1
CHOKING: 0
EYE PAIN: 0
TROUBLE SWALLOWING: 0
SHORTNESS OF BREATH: 0
VOMITING: 0
CONSTIPATION: 0
STRIDOR: 0
EYE ITCHING: 0
COLOR CHANGE: 0
BOWEL INCONTINENCE: 0
CHEST TIGHTNESS: 0
EYE REDNESS: 0
ABDOMINAL DISTENTION: 0
ABDOMINAL PAIN: 0
BLOOD IN STOOL: 0
RHINORRHEA: 0
VOICE CHANGE: 0
WHEEZING: 0

## 2020-07-30 ASSESSMENT — PATIENT HEALTH QUESTIONNAIRE - PHQ9
SUM OF ALL RESPONSES TO PHQ QUESTIONS 1-9: 2
1. LITTLE INTEREST OR PLEASURE IN DOING THINGS: 1
2. FEELING DOWN, DEPRESSED OR HOPELESS: 1
SUM OF ALL RESPONSES TO PHQ QUESTIONS 1-9: 2
SUM OF ALL RESPONSES TO PHQ9 QUESTIONS 1 & 2: 2

## 2020-07-30 NOTE — PROGRESS NOTES
2020     Dang Dacosta (:  1960) is a 64 y.o. female, here for evaluation of the following medical concerns:    Back Pain   This is a recurrent problem. The current episode started more than 1 month ago. The problem occurs constantly. The problem has been gradually worsening since onset. The pain is present in the lumbar spine. The quality of the pain is described as aching and burning. The pain radiates to the left foot and left thigh. The pain is at a severity of 8/10. The pain is severe. The pain is the same all the time. The symptoms are aggravated by bending and standing. Stiffness is present all day. Associated symptoms include leg pain, paresis, paresthesias, tingling and weakness. Pertinent negatives include no abdominal pain, bladder incontinence, bowel incontinence, chest pain, dysuria, fever, headaches, numbness, pelvic pain or perianal numbness. (Left hip pain with walking and weakness of left leg. I reviewed the recent lumbar spine x-ray to show a progression of the degenerative disc disease in the lower lumbar spine compared with a few years ago. Patient has referral to see the spine Center at [facility] but has not called for appointment yet. Referral information reprinted and given to patient.) Risk factors include menopause and lack of exercise. Treatments tried: gabapentin 300 mg tid causing drowsiness but not relief. The treatment provided no relief. Patient Active Problem List   Diagnosis    Nail fungus    Menopause syndrome    Essential hypertension    Pre-ulcerative corn or callous    Microscopic hematuria    Gastroesophageal reflux disease with esophagitis    Acute medial meniscal tear, right, initial encounter    Musculoskeletal back pain    Lumbar degenerative disc disease    Primary osteoarthritis of right knee    Neural foraminal stenosis of lumbar spine   No Known Allergies    Review of Systems   Constitutional: Negative.   Negative for activity change, appetite change, chills, diaphoresis, fatigue and fever. HENT: Negative for dental problem, ear pain, hearing loss, mouth sores, nosebleeds, postnasal drip, rhinorrhea, sore throat, trouble swallowing and voice change. Eyes: Negative for photophobia, pain, discharge, redness, itching and visual disturbance. Respiratory: Negative for apnea, cough, choking, chest tightness, shortness of breath, wheezing and stridor. Cardiovascular: Negative for chest pain, palpitations and leg swelling. Hypertension   Gastrointestinal: Negative for abdominal distention, abdominal pain, anal bleeding, blood in stool, bowel incontinence, constipation, diarrhea, nausea, rectal pain and vomiting. Astral esophageal reflux disease stable. Genitourinary: Negative for bladder incontinence, dysuria, flank pain, frequency, hematuria and pelvic pain. Musculoskeletal: Positive for back pain. Negative for arthralgias, gait problem, joint swelling, myalgias, neck pain and neck stiffness. Skin: Negative for color change, pallor, rash and wound. Neurological: Positive for tingling, weakness and paresthesias. Negative for dizziness, tremors, seizures, syncope, facial asymmetry, speech difficulty, light-headedness, numbness and headaches. Hematological: Negative for adenopathy. Does not bruise/bleed easily. Psychiatric/Behavioral: Negative for agitation, behavioral problems, confusion, decreased concentration, dysphoric mood, hallucinations, self-injury, sleep disturbance and suicidal ideas. The patient is not nervous/anxious and is not hyperactive. Prior to Visit Medications    Medication Sig Taking? Authorizing Provider   olmesartan-hydrochlorothiazide (BENICAR HCT) 40-12.5 MG per tablet Take 1 tablet by mouth daily Yes Wilber De Anda MD   gabapentin (NEURONTIN) 300 MG capsule Take 1 capsule by mouth 3 times daily. For back pain, take all the time.  Yes Wilber De Anda MD dexlansoprazole (DEXILANT) 60 MG CPDR delayed release capsule Take 1 capsule by mouth daily Discontinue omeprazole Yes Lucia Mays MD   Cholecalciferol (VITAMIN D3) 125 MCG (5000 UT) TBDP Take 1 each by mouth daily Yes Lucia Mays MD   Blood Pressure Monitoring (BLOOD PRESSURE CUFF) MISC by Does not apply route. Yes Keylacecilia Healdey, APRN - CNP        Social History     Tobacco Use    Smoking status: Former Smoker     Packs/day: 0.25     Years: 37.00     Pack years: 9.25     Types: Cigarettes     Last attempt to quit: 2012     Years since quittin.4    Smokeless tobacco: Never Used    Tobacco comment: smoke when nerves get bad   Substance Use Topics    Alcohol use: Yes     Alcohol/week: 0.0 standard drinks     Comment: 2x a week        Vitals:    20 1139   BP: 132/83   Pulse: 77   Temp: 97.1 °F (36.2 °C)   TempSrc: Oral   SpO2: 98%   Weight: 153 lb (69.4 kg)   Height: 5' 7\" (1.702 m)     Estimated body mass index is 23.96 kg/m² as calculated from the following:    Height as of this encounter: 5' 7\" (1.702 m). Weight as of this encounter: 153 lb (69.4 kg). Physical Exam  Constitutional:       General: She is not in acute distress. Appearance: She is well-developed. She is not diaphoretic. HENT:      Head: Normocephalic and atraumatic. Right Ear: External ear normal.      Left Ear: External ear normal.      Nose: Nose normal.      Mouth/Throat:      Pharynx: No oropharyngeal exudate. Eyes:      General: No scleral icterus. Right eye: No discharge. Left eye: No discharge. Extraocular Movements: Extraocular movements intact. Conjunctiva/sclera: Conjunctivae normal.      Pupils: Pupils are equal, round, and reactive to light. Neck:      Musculoskeletal: Normal range of motion and neck supple. Thyroid: No thyromegaly. Vascular: No JVD. Trachea: No tracheal deviation.    Cardiovascular:      Rate and Rhythm: Normal children. Depression is also contributing to pain. Mozart fluoxetine 20 mg daily  XR HIP LEFT (2-3 VIEWS)    methylPREDNISolone acetate (DEPO-MEDROL) injection 80 mg, not given,      methylPREDNISolone (MEDROL DOSEPACK) 4 MG tablet   2. Left hip pain most likely all coming from nerve root compression but will rule out arthritis of the hip. XR HIP LEFT (2-3 VIEWS)    methylPREDNISolone acetate (DEPO-MEDROL) injection 80 mg       An electronic signature was used to authenticate this note.     --Elissa Lee MD on 2020 at 11:52 AM

## 2020-07-30 NOTE — TELEPHONE ENCOUNTER
Let patient know that the xray shows the bulging disc in the lower back are worse than before. I gave her a referral to see Dr. Amy Clinton at [facility].  Give there the phone number on the referral.  Lorena Hikes her to call today to arrange for an appointment , as soon as they can see her. . They will treat the pain. They will decide the best treatment.

## 2020-08-03 ENCOUNTER — HOSPITAL ENCOUNTER (OUTPATIENT)
Dept: GENERAL RADIOLOGY | Age: 60
Discharge: HOME | End: 2020-08-03
Payer: COMMERCIAL

## 2020-08-03 ENCOUNTER — HOSPITAL ENCOUNTER (OUTPATIENT)
Age: 60
Discharge: HOME | End: 2020-08-03
Payer: COMMERCIAL

## 2020-08-03 PROCEDURE — 73502 X-RAY EXAM HIP UNI 2-3 VIEWS: CPT

## 2020-08-10 ENCOUNTER — TELEPHONE (OUTPATIENT)
Dept: ORTHOPEDIC SURGERY | Age: 60
End: 2020-08-10

## 2020-08-11 ENCOUNTER — OFFICE VISIT (OUTPATIENT)
Dept: PRIMARY CARE CLINIC | Age: 60
End: 2020-08-11

## 2020-08-11 RX ORDER — CYCLOBENZAPRINE HCL 5 MG
5 TABLET ORAL NIGHTLY PRN
Qty: 30 TABLET | Refills: 0 | Status: SHIPPED | OUTPATIENT
Start: 2020-08-11 | End: 2020-08-11

## 2020-08-14 ENCOUNTER — OFFICE VISIT (OUTPATIENT)
Dept: PRIMARY CARE CLINIC | Age: 60
End: 2020-08-14
Payer: COMMERCIAL

## 2020-08-14 VITALS
TEMPERATURE: 98.6 F | WEIGHT: 156 LBS | BODY MASS INDEX: 24.48 KG/M2 | HEIGHT: 67 IN | SYSTOLIC BLOOD PRESSURE: 137 MMHG | OXYGEN SATURATION: 97 % | HEART RATE: 79 BPM | DIASTOLIC BLOOD PRESSURE: 84 MMHG

## 2020-08-14 PROCEDURE — 99214 OFFICE O/P EST MOD 30 MIN: CPT | Performed by: INTERNAL MEDICINE

## 2020-08-14 ASSESSMENT — ENCOUNTER SYMPTOMS
EYE ITCHING: 0
EYE REDNESS: 0
CHOKING: 0
SORE THROAT: 0
BACK PAIN: 1
RECTAL PAIN: 0
VOMITING: 0
COUGH: 0
BLOOD IN STOOL: 0
EYE DISCHARGE: 0
SHORTNESS OF BREATH: 0
COLOR CHANGE: 0
CHEST TIGHTNESS: 0
DIARRHEA: 0
ABDOMINAL PAIN: 0
NAUSEA: 0
STRIDOR: 0
WHEEZING: 0
RHINORRHEA: 0
TROUBLE SWALLOWING: 0
ANAL BLEEDING: 0
APNEA: 0
EYE PAIN: 0
CONSTIPATION: 0
ABDOMINAL DISTENTION: 0
VOICE CHANGE: 0
PHOTOPHOBIA: 0

## 2020-08-14 NOTE — PROGRESS NOTES
2020    Maya Chanel (:  1960) is a 64 y.o. female, here for  A disability evaluation. Sciatica of the left leg with limited mobility, unable to lift , push , pull, climb. Can not sit without changing position for more than 15-20 minutes. Can not stand for more than 15 minutes. Has problems washing dishes. Patient Active Problem List   Diagnosis    Nail fungus    Menopause syndrome    Essential hypertension    Pre-ulcerative corn or callous    Microscopic hematuria    Gastroesophageal reflux disease with esophagitis    Acute medial meniscal tear, right, initial encounter    Musculoskeletal back pain    Lumbar degenerative disc disease    Primary osteoarthritis of right knee    Neural foraminal stenosis of lumbar spine       Review of Systems   Constitutional: Negative. Negative for activity change, appetite change, chills, diaphoresis, fatigue and fever. HENT: Negative for dental problem, ear pain, hearing loss, mouth sores, nosebleeds, postnasal drip, rhinorrhea, sore throat, trouble swallowing and voice change. Eyes: Negative for photophobia, pain, discharge, redness, itching and visual disturbance. Respiratory: Negative for apnea, cough, choking, chest tightness, shortness of breath, wheezing and stridor. Cardiovascular: Negative for chest pain, palpitations and leg swelling. Hypertension   Gastrointestinal: Negative for abdominal distention, abdominal pain, anal bleeding, blood in stool, constipation, diarrhea, nausea, rectal pain and vomiting. Astral esophageal reflux disease stable. Genitourinary: Negative for dysuria, flank pain, frequency, hematuria and pelvic pain. Musculoskeletal: Positive for back pain. Negative for arthralgias, gait problem, joint swelling, myalgias, neck pain and neck stiffness. Skin: Negative for color change, pallor, rash and wound. Neurological: Positive for weakness.  Negative for dizziness, tremors, seizures, syncope, facial asymmetry, speech difficulty, light-headedness, numbness and headaches. Hematological: Negative for adenopathy. Does not bruise/bleed easily. Psychiatric/Behavioral: Negative for agitation, behavioral problems, confusion, decreased concentration, dysphoric mood, hallucinations, self-injury, sleep disturbance and suicidal ideas. The patient is not nervous/anxious and is not hyperactive. Prior to Visit Medications    Medication Sig Taking? Authorizing Provider   FLUoxetine (PROZAC) 20 MG capsule Take 1 capsule by mouth daily Yes Schuyler Portillo MD   olmesartan-hydrochlorothiazide (BENICAR HCT) 40-12.5 MG per tablet Take 1 tablet by mouth daily Yes Schuyler Portillo MD   gabapentin (NEURONTIN) 300 MG capsule Take 1 capsule by mouth 3 times daily. For back pain, take all the time. Yes Schuyler Portillo MD   dexlansoprazole (DEXILANT) 60 MG CPDR delayed release capsule Take 1 capsule by mouth daily Discontinue omeprazole Yes Schuyler Portillo MD   Cholecalciferol (VITAMIN D3) 125 MCG (5000 UT) TBDP Take 1 each by mouth daily Yes Schuyler Portillo MD   Blood Pressure Monitoring (BLOOD PRESSURE CUFF) MISC by Does not apply route.  Yes VERONICA Morse - CNP        No Known Allergies    Past Medical History:   Diagnosis Date    Depression 12/17/2011    Headache(784.0) 6-7 months    pain in forehead    HTN (hypertension) 10/10/2011    Musculoskeletal back pain 1/10/2018    Primary osteoarthritis of right knee 1/10/2018       Past Surgical History:   Procedure Laterality Date   [address] [address], [facility]    COLONOSCOPY  2003    problems with duodenum    HERNIA REPAIR      TUBAL LIGATION  1990    VENTRAL HERNIA REPAIR  12/03/2012    VENTRAL HERNIA REPAIR WITH MESH              Social History     Socioeconomic History    Marital status:      Spouse name: Not on file    Number of children: Not on file    Years of education: Not on (70.8 kg). Physical Exam  Constitutional:       General: She is not in acute distress. Appearance: She is well-developed. She is not diaphoretic. HENT:      Head: Normocephalic and atraumatic. Right Ear: External ear normal.      Left Ear: External ear normal.      Nose: Nose normal.      Mouth/Throat:      Pharynx: No oropharyngeal exudate. Eyes:      General: No scleral icterus. Right eye: No discharge. Left eye: No discharge. Extraocular Movements: Extraocular movements intact. Conjunctiva/sclera: Conjunctivae normal.      Pupils: Pupils are equal, round, and reactive to light. Neck:      Musculoskeletal: Normal range of motion and neck supple. Thyroid: No thyromegaly. Vascular: No JVD. Trachea: No tracheal deviation. Cardiovascular:      Rate and Rhythm: Normal rate and regular rhythm. Pulses:           Carotid pulses are 0 on the right side and 0 on the left side. Heart sounds: Normal heart sounds. No murmur. No gallop. Pulmonary:      Effort: Pulmonary effort is normal. No respiratory distress. Breath sounds: Normal breath sounds. No wheezing or rales. Chest:      Chest wall: No tenderness. Abdominal:      General: Bowel sounds are normal. There is no distension. Palpations: Abdomen is soft. There is no mass. Tenderness: There is no abdominal tenderness. There is no guarding or rebound. Musculoskeletal: Normal range of motion. General: No tenderness. Comments: Unable to walk on left heels and toes. Unable to flex forward at the lumbar spine. Pain with palpation and rotation of the left hip. No swelling. Lymphadenopathy:      Cervical: No cervical adenopathy. Comments: No adenopathy of cervical, supraclavicular, axillary or inguinal nodes. Skin:     General: Skin is warm and dry. Coloration: Skin is not pale. Findings: No erythema or rash.    Neurological:      Mental Status: She is alert and oriented to person, place, and time. Cranial Nerves: No cranial nerve deficit. Motor: Weakness present. No abnormal muscle tone. Gait: Gait abnormal.      Deep Tendon Reflexes: Reflexes abnormal.      Comments: Gait is slow with weakness of left leg. Psychiatric:         Behavior: Behavior normal.         Thought Content: Thought content normal.         Judgment: Judgment normal.         No flowsheet data found. Lab Results   Component Value Date    CHOL 206 11/29/2012    CHOL 186 12/16/2011    TRIG 86 12/16/2011    HDL 72 12/16/2011    LDLCALC 97 12/16/2011    GLUCOSE 105 04/01/2020    LABA1C 5.3 10/20/2011       The ASCVD Risk score (Alyssa Morataya., et al., 2013) failed to calculate for the following reasons:    Cannot find a previous HDL lab    Cannot find a previous total cholesterol lab    Immunization History   Administered Date(s) Administered    Influenza, Intradermal, Preservative free 10/03/2016       Health Maintenance   Topic Date Due    DTaP/Tdap/Td vaccine (1 - Tdap) 03/21/1981    Shingles Vaccine (1 of 2) 03/21/2012    Colon cancer screen colonoscopy  03/21/2012    Cervical cancer screen  12/23/2014    Lipid screen  11/29/2017    Breast cancer screen  02/24/2019    Flu vaccine (1) 09/01/2020    Potassium monitoring  04/01/2021    Creatinine monitoring  04/01/2021    Hepatitis C screen  Completed    HIV screen  Completed    Hepatitis A vaccine  Aged Out    Hepatitis B vaccine  Aged Out    Hib vaccine  Aged Out    Meningococcal (ACWY) vaccine  Aged Out    Pneumococcal 0-64 years Vaccine  Aged Out       ASSESSMENT/PLAN:   Diagnosis Orders   1. Sciatica of left side  AFL - Felecia Lara MD, Spine Surgery DEPARTMENT [facility]     Still having pain and limited mobility , taking gabapentin 300 mg tid, . Unable to work, will fax paper work to disability. An electronic signature was used to authenticate this note.     --Rachel Fernandes MD on

## 2020-08-18 ENCOUNTER — TELEPHONE (OUTPATIENT)
Dept: PRIMARY CARE CLINIC | Age: 60
End: 2020-08-18

## 2020-08-18 NOTE — TELEPHONE ENCOUNTER
Nonda Boas   2020 3:27 PM   Telephone   MRN:  <[id number]>   Description: Female : 1960  Provider: Parvez Santamaria MD  Department: [facility]    Reason for Call     Other     Call Documentation     Ivis Kaylie at 2020  3:27 PM     Status: Signed       ----- Message from [facility] sent at 2020  1:39 PM EDT -----  Subject: Referral Request     QUESTIONS   Reason for referral request? Patient went to have xrays taken as requested   by the doctor and they would not see her because there was no   authorization . pt. is gettting knee   back and spine need to be xrayed. Has the physician seen you for this condition before? No   Preferred Specialist (if applicable)? Do you already have an appointment scheduled? No  Additional Information for Provider? patient does not want to go back   unless they have authorization for xtay.   ---------------------------------------------------------------------------  --------------  CALL BACK INFO  What is the best way for the office to contact you? OK to leave message on   voicemail  Preferred Call Back Phone Number?  [phone]              Dang Bronson will you find out why they would not perform the routine x-rays on placement when ordering

## 2020-08-19 ENCOUNTER — TELEPHONE (OUTPATIENT)
Dept: PRIMARY CARE CLINIC | Age: 60
End: 2020-08-19

## 2020-08-19 NOTE — TELEPHONE ENCOUNTER
----- Message from [facility] sent at 8/18/2020  1:39 PM EDT -----  Subject: Referral Request    QUESTIONS   Reason for referral request? Patient went to have xrays taken as requested   by the doctor and they would not see her because there was no   authorization . pt. is gettting knee   back and spine need to be xrayed. Has the physician seen you for this condition before? No   Preferred Specialist (if applicable)? Do you already have an appointment scheduled? No  Additional Information for Provider? patient does not want to go back   unless they have authorization for xtay.   ---------------------------------------------------------------------------  --------------  CALL BACK INFO  What is the best way for the office to contact you? OK to leave message on   voicemail  Preferred Call Back Phone Number?  [phone]

## 2020-08-21 ENCOUNTER — TELEPHONE (OUTPATIENT)
Dept: PRIMARY CARE CLINIC | Age: 60
End: 2020-08-21

## 2020-08-21 NOTE — TELEPHONE ENCOUNTER
----- Message from Nabeel Plaza sent at 8/19/2020  1:48 PM EDT -----  Subject: Message to Provider    QUESTIONS  Information for Provider? Patient needs to see Dr. Janak Whelan to get a paper   filled out in order to receive her disability benefits   as soon as possible.  ---------------------------------------------------------------------------  --------------  CALL BACK INFO  What is the best way for the office to contact you? OK to leave message on   voicemail  Preferred Call Back Phone Number? [phone]  ---------------------------------------------------------------------------  --------------  SCRIPT ANSWERS  Relationship to Patient?  Self

## 2020-08-24 ENCOUNTER — OFFICE VISIT (OUTPATIENT)
Dept: ORTHOPEDIC SURGERY | Age: 60
End: 2020-08-24
Payer: COMMERCIAL

## 2020-08-24 VITALS — BODY MASS INDEX: 20.89 KG/M2 | HEIGHT: 66 IN | TEMPERATURE: 98.8 F | WEIGHT: 130 LBS

## 2020-08-24 PROBLEM — M25.562 CHRONIC PAIN OF LEFT KNEE: Status: ACTIVE | Noted: 2020-08-24

## 2020-08-24 PROBLEM — M25.551 PAIN OF BOTH HIP JOINTS: Status: ACTIVE | Noted: 2020-08-24

## 2020-08-24 PROBLEM — M70.61 TROCHANTERIC BURSITIS OF BOTH HIPS: Status: ACTIVE | Noted: 2020-08-24

## 2020-08-24 PROBLEM — G89.29 CHRONIC PAIN OF LEFT KNEE: Status: ACTIVE | Noted: 2020-08-24

## 2020-08-24 PROBLEM — M25.552 PAIN OF BOTH HIP JOINTS: Status: ACTIVE | Noted: 2020-08-24

## 2020-08-24 PROBLEM — M70.62 TROCHANTERIC BURSITIS OF BOTH HIPS: Status: ACTIVE | Noted: 2020-08-24

## 2020-08-24 PROBLEM — M25.561 CHRONIC PAIN OF RIGHT KNEE: Status: ACTIVE | Noted: 2020-08-24

## 2020-08-24 PROCEDURE — 99203 OFFICE O/P NEW LOW 30 MIN: CPT | Performed by: PHYSICIAN ASSISTANT

## 2020-08-25 NOTE — PROGRESS NOTES
Subjective:      Patient ID: Grupo Schultz is a 64 y.o. female. Chief Complaint   Patient presents with    Knee Pain     Bilateral knee. left hip pain         HPI:   She is here for an initial evaluation of a new problem. Bilateral lower extremity hip and knee pain. Symptoms have been going on for several years. Symptoms have progressively worsened. She denies any history of traumatic event. She does have a history of low back pain and has been worked up for her back years ago. Pain Scale   Left knee pain 8/10 VAS. Right knee pain 6/10 VAS. Bilateral hip pain 8/10 VAS. Pain is worse with activities. Pain improves with rest.   Previous treatments have included therapy for her back in the past.  No treatment for the hip or knees. .     Review Of Systems:   A 14 point review of systems and history form completed by the patient has been reviewed. This scanned in the media tab of the patient's chart under today's date. As outlined in the HPI. Negative for fever or chills. Positive for joint pain, swelling and stiffness. Positive for numbness or tingling.      Past Medical History:   Diagnosis Date    Depression 2011    Headache(784.0) 6-7 months    pain in forehead    HTN (hypertension) 10/10/2011    Musculoskeletal back pain 1/10/2018    Primary osteoarthritis of right knee 1/10/2018       Family History   Problem Relation Age of Onset    Cancer Father     Diabetes Mother     High Blood Pressure Mother     Stroke Mother        Past Surgical History:   Procedure Laterality Date     SECTION  ,     COLONOSCOPY      problems with duodenum    HERNIA REPAIR      TUBAL LIGATION      VENTRAL HERNIA REPAIR  2012    VENTRAL HERNIA REPAIR WITH MESH              Social History     Occupational History    Not on file   Tobacco Use    Smoking status: Former Smoker     Packs/day: 0.25     Years: 37.00     Pack years: 9.25     Types: Cigarettes     Last attempt to quit: 2012     Years since quittin.5    Smokeless tobacco: Never Used    Tobacco comment: smoke when nerves get bad   Substance and Sexual Activity    Alcohol use: Yes     Alcohol/week: 0.0 standard drinks     Comment: 2x a week    Drug use: No    Sexual activity: Yes     Partners: Male       Current Outpatient Medications   Medication Sig Dispense Refill    FLUoxetine (PROZAC) 20 MG capsule Take 1 capsule by mouth daily 30 capsule 3    olmesartan-hydrochlorothiazide (BENICAR HCT) 40-12.5 MG per tablet Take 1 tablet by mouth daily 90 tablet 1    gabapentin (NEURONTIN) 300 MG capsule Take 1 capsule by mouth 3 times daily. For back pain, take all the time. 90 capsule 5    dexlansoprazole (DEXILANT) 60 MG CPDR delayed release capsule Take 1 capsule by mouth daily Discontinue omeprazole 30 capsule 5    Cholecalciferol (VITAMIN D3) 125 MCG (5000 UT) TBDP Take 1 each by mouth daily 30 tablet 5    Blood Pressure Monitoring (BLOOD PRESSURE CUFF) MISC by Does not apply route. 1 each 0     Current Facility-Administered Medications   Medication Dose Route Frequency Provider Last Rate Last Dose    methylPREDNISolone acetate (DEPO-MEDROL) injection 80 mg  80 mg Intramuscular Once Tj Gómez MD             Objective:     She is alert, oriented x 3, pleasant, well nourished, developed and in no   acute distress. Temp 98.8 °F (37.1 °C)   Ht 5' 6\" (1.676 m)   Wt 130 lb (59 kg)   BMI 20.98 kg/m²      HIP EXAM:  Examination of the left and right hip shows: There is not deformity. There is not erythema. There is no pain with internal and external rotation. There is no pain with flexion and extension. There is no pain with active SLR. ROM diminished range of motion. Leg lengths: Equal  Trochanteric region is  tender to palpation. Sacral Iliac is not tender to palpation. There is no pain with weight bearing. Examination of the left and right knee:    The alignment of the compartments. No acute fractures or dislocations noted. Additional Tests reviewed: None. Additional Outside Records reviewed: None. Diagnosis:       ICD-10-CM    1. Pain of both hip joints  M25.551 CANCELED: XR HIP 3-4 VW W PELVIS BILATERAL    M25.552    2. Chronic pain of right knee  M25.561 XR KNEE BILATERAL STANDING    G89.29 XR KNEE RIGHT (1-2 VIEWS)     Ambulatory referral to Physical Therapy   3. Chronic pain of left knee  M25.562 XR KNEE LEFT (1-2 VIEWS)    G89.29 Ambulatory referral to Physical Therapy   4. Trochanteric bursitis of both hips  M70.61 Ambulatory referral to Physical Therapy    M70.62         Assessment and Plan:       Trochanteric bursitis of the left and right hip and mild degenerative arthritis of left and right knee. The natural history of the patient's diagnosis as well as the treatment options were discussed in full and questions were answered. Risks and benefits of the treatment options also reviewed in detail. A home exercise program was instructed today including ROM exercises and strengthening exercises. The patient verbalized understanding of these exercises as well as the importance of the exercise program to promote return of normal function. If pain intensifies or other problems arise you are to notify the office. PT Rx provided for hip and knee. Cortisone injection  PROCEDURE NOTE:  Pre op Diagnosis:  Trochanteric Bursitis  Post op Diagnosis: Same  With her  permission,  The left and right hips were prepped  in standard sterile fashion with  Alcohol and 2 cc of 0.25% Marcaine and 1 cc of Kenalog 40 mg was injected into the left and right lateral trochanteric region without difficulty. She tolerated this well without difficulty. A band-aid was applied. She   was advised to ice the hip for 15-20 minutes to relieve any injection site related pain.     Rest, Ice, Compression and Elevation  OTC NSAID'S discussed to be taken in appropriate  therapeutic

## 2020-08-27 ENCOUNTER — OFFICE VISIT (OUTPATIENT)
Dept: PRIMARY CARE CLINIC | Age: 60
End: 2020-08-27
Payer: COMMERCIAL

## 2020-08-27 VITALS
DIASTOLIC BLOOD PRESSURE: 81 MMHG | BODY MASS INDEX: 24.01 KG/M2 | HEART RATE: 73 BPM | TEMPERATURE: 97.2 F | SYSTOLIC BLOOD PRESSURE: 133 MMHG | HEIGHT: 67 IN | OXYGEN SATURATION: 99 % | WEIGHT: 153 LBS

## 2020-08-27 PROBLEM — M54.32 SCIATICA OF LEFT SIDE: Status: ACTIVE | Noted: 2020-08-27

## 2020-08-27 PROCEDURE — G0444 DEPRESSION SCREEN ANNUAL: HCPCS | Performed by: INTERNAL MEDICINE

## 2020-08-27 PROCEDURE — 99214 OFFICE O/P EST MOD 30 MIN: CPT | Performed by: INTERNAL MEDICINE

## 2020-08-27 ASSESSMENT — PATIENT HEALTH QUESTIONNAIRE - PHQ9
10. IF YOU CHECKED OFF ANY PROBLEMS, HOW DIFFICULT HAVE THESE PROBLEMS MADE IT FOR YOU TO DO YOUR WORK, TAKE CARE OF THINGS AT HOME, OR GET ALONG WITH OTHER PEOPLE: 2
8. MOVING OR SPEAKING SO SLOWLY THAT OTHER PEOPLE COULD HAVE NOTICED. OR THE OPPOSITE, BEING SO FIGETY OR RESTLESS THAT YOU HAVE BEEN MOVING AROUND A LOT MORE THAN USUAL: 2
3. TROUBLE FALLING OR STAYING ASLEEP: 2
4. FEELING TIRED OR HAVING LITTLE ENERGY: 2
SUM OF ALL RESPONSES TO PHQ QUESTIONS 1-9: 14
9. THOUGHTS THAT YOU WOULD BE BETTER OFF DEAD, OR OF HURTING YOURSELF: 0
6. FEELING BAD ABOUT YOURSELF - OR THAT YOU ARE A FAILURE OR HAVE LET YOURSELF OR YOUR FAMILY DOWN: 2
7. TROUBLE CONCENTRATING ON THINGS, SUCH AS READING THE NEWSPAPER OR WATCHING TELEVISION: 2
SUM OF ALL RESPONSES TO PHQ QUESTIONS 1-9: 14
1. LITTLE INTEREST OR PLEASURE IN DOING THINGS: 2
5. POOR APPETITE OR OVEREATING: 2

## 2020-08-27 ASSESSMENT — ENCOUNTER SYMPTOMS
BOWEL INCONTINENCE: 0
BACK PAIN: 1

## 2020-08-27 NOTE — PROGRESS NOTES
Blood Pressure Monitoring (BLOOD PRESSURE CUFF) MISC by Does not apply route. Yes VERONICA Cross - CNP        Social History     Tobacco Use    Smoking status: Former Smoker     Packs/day: 0.25     Years: 37.00     Pack years: 9.25     Types: Cigarettes     Last attempt to quit: 2012     Years since quittin.5    Smokeless tobacco: Never Used    Tobacco comment: smoke when nerves get bad   Substance Use Topics    Alcohol use: Yes     Alcohol/week: 0.0 standard drinks     Comment: 2x a week        Vitals:    20 0957   BP: 133/81   Pulse: 73   Temp: 97.2 °F (36.2 °C)   TempSrc: Oral   SpO2: 99%   Weight: 153 lb (69.4 kg)   Height: 5' 7\" (1.702 m)     Estimated body mass index is 23.96 kg/m² as calculated from the following:    Height as of this encounter: 5' 7\" (1.702 m). Weight as of this encounter: 153 lb (69.4 kg). Review of Systems   Constitutional: Negative. Negative for activity change, appetite change, chills, diaphoresis, fatigue and fever. HENT: Negative for dental problem, ear pain, hearing loss, mouth sores, nosebleeds, postnasal drip, rhinorrhea, sore throat, trouble swallowing and voice change. Eyes: Negative for photophobia, pain, discharge, redness, itching and visual disturbance. Respiratory: Negative for apnea, cough, choking, chest tightness, shortness of breath, wheezing and stridor. Cardiovascular: Negative for chest pain, palpitations and leg swelling. Hypertension   Gastrointestinal: Negative for abdominal distention, abdominal pain, anal bleeding, blood in stool, bowel incontinence, constipation, diarrhea, nausea, rectal pain and vomiting. Astral esophageal reflux disease stable. Genitourinary: Negative for bladder incontinence, dysuria, flank pain, frequency, hematuria and pelvic pain. Musculoskeletal: Positive for back pain. Negative for arthralgias, gait problem, joint swelling, myalgias, neck pain and neck stiffness.    Skin:

## 2020-08-29 ASSESSMENT — ENCOUNTER SYMPTOMS
SHORTNESS OF BREATH: 0
EYE DISCHARGE: 0
EYE ITCHING: 0
CHOKING: 0
COLOR CHANGE: 0
VOICE CHANGE: 0
APNEA: 0
CONSTIPATION: 0
SORE THROAT: 0
RECTAL PAIN: 0
PHOTOPHOBIA: 0
EYE REDNESS: 0
EYE PAIN: 0
DIARRHEA: 0
COUGH: 0
CHEST TIGHTNESS: 0
STRIDOR: 0
RHINORRHEA: 0
ABDOMINAL PAIN: 0
WHEEZING: 0
BLOOD IN STOOL: 0
TROUBLE SWALLOWING: 0
VOMITING: 0
NAUSEA: 0
ANAL BLEEDING: 0
ABDOMINAL DISTENTION: 0

## 2020-08-31 ENCOUNTER — TELEPHONE (OUTPATIENT)
Dept: PRIMARY CARE CLINIC | Age: 60
End: 2020-08-31

## 2020-08-31 RX ORDER — FLUOXETINE HYDROCHLORIDE 20 MG/1
20 CAPSULE ORAL DAILY
Qty: 30 CAPSULE | Refills: 3 | Status: SHIPPED | OUTPATIENT
Start: 2020-08-31 | End: 2021-02-23

## 2020-09-01 ENCOUNTER — OFFICE VISIT (OUTPATIENT)
Dept: PRIMARY CARE CLINIC | Age: 60
End: 2020-09-01
Payer: COMMERCIAL

## 2020-09-01 VITALS
TEMPERATURE: 98.1 F | SYSTOLIC BLOOD PRESSURE: 119 MMHG | OXYGEN SATURATION: 98 % | HEIGHT: 67 IN | DIASTOLIC BLOOD PRESSURE: 78 MMHG | WEIGHT: 152 LBS | HEART RATE: 81 BPM | BODY MASS INDEX: 23.86 KG/M2

## 2020-09-01 PROBLEM — S39.012A LUMBAR STRAIN: Status: ACTIVE | Noted: 2017-06-26

## 2020-09-01 PROCEDURE — 99213 OFFICE O/P EST LOW 20 MIN: CPT | Performed by: INTERNAL MEDICINE

## 2020-09-01 ASSESSMENT — ENCOUNTER SYMPTOMS
BACK PAIN: 1
VOICE CHANGE: 0
BLOOD IN STOOL: 0
DIARRHEA: 0
PHOTOPHOBIA: 0
SORE THROAT: 0
VOMITING: 0
ABDOMINAL PAIN: 0
CHEST TIGHTNESS: 0
APNEA: 0
WHEEZING: 0
EYE DISCHARGE: 0
ABDOMINAL DISTENTION: 0
EYE REDNESS: 0
EYE PAIN: 0
SHORTNESS OF BREATH: 0
RECTAL PAIN: 0
COLOR CHANGE: 0
CHOKING: 0
STRIDOR: 0
ANAL BLEEDING: 0
TROUBLE SWALLOWING: 0
RHINORRHEA: 0
BOWEL INCONTINENCE: 0
NAUSEA: 0
COUGH: 0
EYE ITCHING: 0
CONSTIPATION: 0

## 2020-09-01 NOTE — PROGRESS NOTES
2020     Agus Siddiqui (:  1960) is a 64 y.o. female, here for evaluation of the following medical concerns:    Back Pain   This is a chronic problem. The current episode started more than 1 year ago. The problem occurs constantly. The pain is present in the lumbar spine. The quality of the pain is described as aching and burning. Pain scale: Patient had epidural steroid this month and pain has decreased 50% The symptoms are aggravated by bending, twisting and standing. Associated symptoms include leg pain, numbness and weakness. Pertinent negatives include no abdominal pain, bladder incontinence, bowel incontinence, chest pain, dysuria, fever, headaches or pelvic pain. Treatments tried: epidural steroid injection. The treatment provided moderate relief. depression:  Will start prozac. Patient Active Problem List   Diagnosis    Nail fungus    Menopause syndrome    Essential hypertension    Pre-ulcerative corn or callous    Microscopic hematuria    Gastroesophageal reflux disease with esophagitis    Acute medial meniscal tear, right, initial encounter    Musculoskeletal back pain    Lumbar degenerative disc disease    Primary osteoarthritis of right knee    Neural foraminal stenosis of lumbar spine    Chronic pain of right knee    Pain of both hip joints    Trochanteric bursitis of both hips    Sciatica of left side     No Known Allergies      Review of Systems   Constitutional: Negative. Negative for activity change, appetite change, chills, diaphoresis, fatigue and fever. HENT: Negative for dental problem, ear pain, hearing loss, mouth sores, nosebleeds, postnasal drip, rhinorrhea, sore throat, trouble swallowing and voice change. Eyes: Negative for photophobia, pain, discharge, redness, itching and visual disturbance. Respiratory: Negative for apnea, cough, choking, chest tightness, shortness of breath, wheezing and stridor.     Cardiovascular: Negative for chest pain, palpitations and leg swelling. Hypertension   Gastrointestinal: Negative for abdominal distention, abdominal pain, anal bleeding, blood in stool, bowel incontinence, constipation, diarrhea, nausea, rectal pain and vomiting. Astral esophageal reflux disease stable. Genitourinary: Negative for bladder incontinence, dysuria, flank pain, frequency, hematuria and pelvic pain. Musculoskeletal: Positive for back pain. Negative for arthralgias, gait problem, joint swelling, myalgias, neck pain and neck stiffness. Skin: Negative for color change, pallor, rash and wound. Neurological: Positive for weakness and numbness. Negative for dizziness, tremors, seizures, syncope, facial asymmetry, speech difficulty, light-headedness and headaches. Hematological: Negative for adenopathy. Does not bruise/bleed easily. Psychiatric/Behavioral: Positive for decreased concentration, dysphoric mood and sleep disturbance. Negative for agitation, behavioral problems, confusion, hallucinations, self-injury and suicidal ideas. The patient is not nervous/anxious and is not hyperactive. Prior to Visit Medications    Medication Sig Taking? Authorizing Provider   FLUoxetine (PROZAC) 20 MG capsule Take 1 capsule by mouth daily Yes Ravi Morales MD   olmesartan-hydrochlorothiazide (BENICAR HCT) 40-12.5 MG per tablet Take 1 tablet by mouth daily Yes Ravi Morales MD   gabapentin (NEURONTIN) 300 MG capsule Take 1 capsule by mouth 3 times daily. For back pain, take all the time. Yes Ravi Morales MD   dexlansoprazole (DEXILANT) 60 MG CPDR delayed release capsule Take 1 capsule by mouth daily Discontinue omeprazole Yes Ravi Morales MD   Cholecalciferol (VITAMIN D3) 125 MCG (5000 UT) TBDP Take 1 each by mouth daily Yes Ravi Morales MD   Blood Pressure Monitoring (BLOOD PRESSURE CUFF) MISC by Does not apply route.  Yes VERONICA Jaime - CNP        Social History     Tobacco Use    Smoking status: Former Smoker     Packs/day: 0.25     Years: 37.00     Pack years: 9.25     Types: Cigarettes     Last attempt to quit: 2012     Years since quittin.5    Smokeless tobacco: Never Used    Tobacco comment: smoke when nerves get bad   Substance Use Topics    Alcohol use: Yes     Alcohol/week: 0.0 standard drinks     Comment: 2x a week        Vitals:    20 1131   BP: 119/78   Pulse: 81   Temp: 98.1 °F (36.7 °C)   TempSrc: Oral   SpO2: 98%   Weight: 152 lb (68.9 kg)   Height: 5' 7\" (1.702 m)     Estimated body mass index is 23.81 kg/m² as calculated from the following:    Height as of this encounter: 5' 7\" (1.702 m). Weight as of this encounter: 152 lb (68.9 kg). Physical Exam  Constitutional:       General: She is not in acute distress. Appearance: She is well-developed. She is not diaphoretic. HENT:      Head: Normocephalic and atraumatic. Right Ear: External ear normal.      Left Ear: External ear normal.      Nose: Nose normal.      Mouth/Throat:      Pharynx: No oropharyngeal exudate. Eyes:      General: No scleral icterus. Right eye: No discharge. Left eye: No discharge. Extraocular Movements: Extraocular movements intact. Conjunctiva/sclera: Conjunctivae normal.      Pupils: Pupils are equal, round, and reactive to light. Neck:      Musculoskeletal: Normal range of motion and neck supple. Thyroid: No thyromegaly. Vascular: No JVD. Trachea: No tracheal deviation. Cardiovascular:      Rate and Rhythm: Normal rate and regular rhythm. Pulses:           Carotid pulses are 0 on the right side and 0 on the left side. Heart sounds: Normal heart sounds. No murmur. No gallop. Pulmonary:      Effort: Pulmonary effort is normal. No respiratory distress. Breath sounds: Normal breath sounds. No wheezing or rales. Chest:      Chest wall: No tenderness.    Abdominal:      General: Bowel sounds are normal. There is no distension. Palpations: Abdomen is soft. There is no mass. Tenderness: There is no abdominal tenderness. There is no guarding or rebound. Musculoskeletal: Normal range of motion. General: No tenderness. Lymphadenopathy:      Cervical: No cervical adenopathy. Comments: No adenopathy of cervical, supraclavicular, axillary or inguinal nodes. Skin:     General: Skin is warm and dry. Coloration: Skin is not pale. Findings: No erythema or rash. Neurological:      Mental Status: She is alert and oriented to person, place, and time. Cranial Nerves: No cranial nerve deficit. Motor: Weakness present. No abnormal muscle tone. Gait: Gait abnormal.      Deep Tendon Reflexes: Reflexes abnormal.      Comments: Gait is slow with weakness of left leg. Positive left leg lift at 30 degree ankle. Negative leg lift on right. Psychiatric:         Behavior: Behavior normal.         Thought Content: Thought content normal.         Judgment: Judgment normal.         ASSESSMENT/PLAN:   Diagnosis Orders   1. Sciatica, left side with limited activity, better on gabapentin and has up coming appointment with spine specialist.  Hip feels better cortisone injections shots in both hips with ortho.l    2. Essential hypertension controlled. Continue medication. BP Readings from Last 3 Encounters:   09/01/20 119/78   08/27/20 133/81   08/14/20 137/84           An electronic signature was used to authenticate this note.     --Merrick Powell MD on 9/1/2020 at 12:25 PM

## 2020-10-20 ENCOUNTER — OFFICE VISIT (OUTPATIENT)
Dept: ORTHOPEDIC SURGERY | Age: 60
End: 2020-10-20
Payer: COMMERCIAL

## 2020-10-20 VITALS — TEMPERATURE: 97.3 F

## 2020-10-20 PROBLEM — M43.16 SPONDYLOLISTHESIS OF LUMBAR REGION: Status: ACTIVE | Noted: 2020-10-20

## 2020-10-20 PROCEDURE — 99214 OFFICE O/P EST MOD 30 MIN: CPT | Performed by: PHYSICIAN ASSISTANT

## 2020-10-21 NOTE — PROGRESS NOTES
Yes     Partners: Male       Current Outpatient Medications   Medication Sig Dispense Refill    FLUoxetine (PROZAC) 20 MG capsule Take 1 capsule by mouth daily 30 capsule 3    olmesartan-hydrochlorothiazide (BENICAR HCT) 40-12.5 MG per tablet Take 1 tablet by mouth daily 90 tablet 1    gabapentin (NEURONTIN) 300 MG capsule Take 1 capsule by mouth 3 times daily. For back pain, take all the time. 90 capsule 5    dexlansoprazole (DEXILANT) 60 MG CPDR delayed release capsule Take 1 capsule by mouth daily Discontinue omeprazole 30 capsule 5    Cholecalciferol (VITAMIN D3) 125 MCG (5000 UT) TBDP Take 1 each by mouth daily 30 tablet 5    Blood Pressure Monitoring (BLOOD PRESSURE CUFF) MISC by Does not apply route. 1 each 0     Current Facility-Administered Medications   Medication Dose Route Frequency Provider Last Rate Last Dose    methylPREDNISolone acetate (DEPO-MEDROL) injection 80 mg  80 mg Intramuscular Once Sandie Conte MD             Objective:   She is alert, oriented x 3, pleasant, well nourished, developed and in no acute distress. Temp 97.3 °F (36.3 °C) (Temporal)      LUMBAR SPINE EXAM:  Examination of the Lumbar spine shows:  Deformity Absent . Soft Tissue Swelling Absent . Soft Tissue Tenderness Present. Midline Bone Tenderness Absent . Paraspinal Muscular Spasm Present. Previous Incisions Absent . Erythema Absent . Lumbar Flexion does produce pain. Lumbar Extension does produce pain. NEUROLOGICAL EXAM:  SLR     Left: Negative. Right Negative. DTR absent bilaterally at the patella and Achilles symmetrically. Motor Strength Exam:  5/5 in all major motor groups of the lower extremities. Bazan's Sign Absent   Gait normal Heel/ Toe. Sensation to Touch normal    VASCULAR EXAM:  Examination of the upper and lower extremities shows intact perfusion to all extremities, no cyanosis, digits are warm to touch, capillary refill is less than 2 seconds. No significant edema noted. SKIN:  Examination of the skin reveals the skin to be intact without lacerations, abrasions, significant erythema, rashes or skin lesions. X Rays: not performed in the office today:   Prior x-rays demonstrated degenerative disc disease, facet arthritis and a grade 1 spondylolisthesis of L4 on L5. Diagnosis:        ICD-10-CM    1. Trochanteric bursitis of both hips  M70.61 Ambulatory referral to Physical Therapy    M70.62    2. DDD (degenerative disc disease), lumbar  M51.36 Ambulatory referral to Physical Therapy   3. Spondylolisthesis of lumbar region  M43.16 Ambulatory referral to Physical Therapy        Assessment/ Plan:      I did spend 25 minutes in the office today with greater than 50% of this time counseling, reviewing diagnostic tests, face to face discussion concerning their diagnosis and treatment options. All of their questions were answered. Chronic back pain. Somewhat improved with Medrol Dosepak, Neurontin and Flexeril. The natural history of the patient's diagnosis as well as the treatment options were discussed in full and questions were answered. Risks and benefits of the treatment options also reviewed in detail. At this time I am recommending that she get involved with physical therapy for core strengthening exercises and modalities. Continue current medications including Neurontin and Flexeril. If she fails to improve with therapy then consider MRI lumbar spine to plan for possible therapeutic steroid injections. Follow Up: 6 weeks  Call or return to clinic prn if these symptoms worsen or fail to improve as anticipated.

## 2020-10-22 ENCOUNTER — HOSPITAL ENCOUNTER (OUTPATIENT)
Dept: PHYSICAL THERAPY | Age: 60
Setting detail: THERAPIES SERIES
Discharge: HOME | End: 2020-10-22
Payer: COMMERCIAL

## 2020-10-22 PROCEDURE — 97110 THERAPEUTIC EXERCISES: CPT

## 2020-10-22 PROCEDURE — 97162 PT EVAL MOD COMPLEX 30 MIN: CPT

## 2020-10-22 PROCEDURE — 97530 THERAPEUTIC ACTIVITIES: CPT

## 2020-10-22 NOTE — PLAN OF CARE
Outpatient Physical Therapy  [] [facility]    Phone: [phone]   Fax: [phone]   [x] [facility]  Phone: [phone]              Fax: [phone]  [] [facility]   Phone: [phone]   Fax: [phone]     To: Referring Practitioner: Rusty Rivera PA-C      Patient: Mariel Mota   : 1960   MRN: [id number]  Evaluation Date: 10/22/2020      Diagnosis Information:  · Diagnosis: DDD- Lumbar, Spondylolisthesis of lumbar   · Treatment Diagnosis: Pain with neural tension. Decreased lumbar left SB AROM and left hip/knee strength     Physical Therapy Certification/Re-Certification Form  Dear Rusty Rivera PA-C  The following patient has been evaluated for physical therapy services and for therapy to continue, Medicare requires monthly physician review of the treatment plan. Please review the attached evaluation and/or summary of the patient's plan of care, and verify that you agree therapy should continue by signing the attached document and sending it back to our office. Plan of Care/Treatment to date:  [x] Therapeutic Exercise    [x] Modalities:  [x] Therapeutic Activity     [x] Ultrasound  [x] Electrical Stimulation  [] Gait Training      [] Cervical Traction [] Lumbar Traction  [] Neuromuscular Re-education    [x] Cold/hotpack [] Iontophoresis   [x] Instruction in HEP     Other:  [x] Manual Therapy      []             [] Aquatic Therapy      []           ? Frequency/Duration:  # Days per week: [] 1 day # Weeks: [] 1 week [] 5 weeks     [x] 2 days? [] 2 weeks [x] 6 weeks     [] 3 days   [] 3 weeks [] 7 weeks     [] 4 days   [] 4 weeks [x] 8 weeks    Rehab Potential: [] Excellent [x] Good [x] Fair  [] Poor       Electronically signed by:  Janey Montesinos PT      If you have any questions or concerns, please don't hesitate to call.   Thank you for your referral.      Physician Signature:________________________________Date:__________________  By signing above, therapists plan is approved by physician

## 2020-10-22 NOTE — FLOWSHEET NOTE
Physical Therapy Daily Treatment Note  Date:  10/22/2020    Patient Name:  Xochilt Bañuelos    :  1960  MRN: [id number]    Restrictions/Precautions:     Pertinent Medical History: Sciatic L side, HTN, OA of R knee, Hernia Repair   Medical/Treatment Diagnosis Information:  · Diagnosis: DDD- Lumbar, Spondylolisthesis of lumbar  · Treatment Diagnosis: Pain with neural tension. Decreased lumbar left SB AROM and left hip/knee strength    Insurance/Certification information:  PT Insurance Information: [employer] [address] [address]  Physician Information:  Referring Practitioner: Vandana Whitehead PA-C  Plan of care signed (Y/N):    Visit# / total visits:    Pain level: /10     Functional Outcomes Measure:  Test: Oswestry  Score:43 (CJ)    Progress Note: []  Yes  []  No  Next due by: Visit #10      History of Injury:   Pt states she has had back pain for a couple years from no known onset. Pain is 8/10. Pain is primarily in low back and has become worse in the past 3 months. Pt also gets some pain down into left upper leg with numbness but that occurs every couple days. Standing makes pain travel down to her leg if she does it for any period of time. Pt has noticed problems with her balance too because of it. Doesn't bother her driving. States she had epidural injections and it helped some. She takes gabapentin 3x per day. States she also has left knee pain that comes with the back pain but sometimes left knee just hurts. Goal is to decrease pain so she doesn't have to use medications or shots.  Pt states she hopes to get back to workout program and walking a mile or two    Subjective:       Objective:   Observation:    Test measurements:      Exercises:  Exercise/Equipment Resistance/Repetitions Other comments   HSS     PPT     TATA See how pt responds                        HEP     SKTC, Piriformis, and HS Stretch      PPT                           Other Therapeutic Activities:    Discussed purpose, risks and benefits of PT with pt who is agreeable to POC. Home Exercise Program:    Instructed patient on an HEP. Patient demonstrated exercises correctly. Handout with pictures and # of reps/sets was given. Exercises are listed above. Manual Treatments: Add STM to lumbar paraspinals, lumbar PA mobilizations, Hip Distraction mobilization, Left HS Stretch    Modalities:        Progression Towards Functional goals:  [] Patient is progressing as expected towards functional goals listed. [] Progression is slowed due to complexities listed. [] Progression has been slowed due to co-morbidities. [x] Plan just implemented, too soon to assess goals progression  [] Other:    Charges: Therapeutic Exercise:  [x] (68717) Provided verbal/tactile cueing for activities to restore or maintain strength, flexibility, endurance, ROM for improvements with self-care, mobility, lifting and ambulation. Neuromuscular Re-Education  [] (68179) Provided verbal/tactile cueing for activities to restore or maintain balance, coordination, kinesthetic sense, posture, motor skill, proprioception for self-care, mobility, lifting, and ambulation. Therapeutic Activities:    [x] (61442) Provided verbal/tactile cueing to address functional limitations related to loss of mobility, strength, balance, and coordination.      Gait Training:  [] (61766) Provided training and instruction to the patient for proper postural muscle recruitment and positioning with ambulation re-education     Home Exercise Program:    [x] (85759) Reviewed/Progressed HEP activities related to strengthening, flexibility, endurance, ROM for functional self-care, mobility, lifting and ambulation   [] (18719) Reviewed/Progressed HEP activities related to improving balance, coordination, kinesthetic sense, posture, motor skill, proprioception for self-care, mobility, lifting, and ambulation      Manual Treatments:  MFR / STM / Oscillations-Mobs:  G-I, II, III, IV / Manipulation / MLD  [x] (15724) Provided manual therapy to mobilize  soft tissue/joints/fluid for the purpose of modulating pain, promoting relaxation, increasing ROM, reducing/eliminating soft tissue swelling/inflammation/restriction, improving soft tissue extensibility and allowing for proper ROM for normal function with self- care, mobility, lifting and ambulation.         Timed Code Treatment Minutes: 26   Total Treatment Minutes: 50     [] EVAL (LOW) 36403   [x] EVAL (MOD) 44194   [] EVAL (HIGH) 27372   [] RE-EVAL   [x] TE (97879) x     [] Aquatic (90505) x  [] NMR (76812)   x  [] Aquatic Group (41731) x  [] Manual (20062) x    [] Ultrasound (08700) x  [x] TA (57781) x  [] Mech Traction (50806)  [] Ionto (21921)           [] ES (un) (70867):   [] Vasopump (64904) [] Other:      Assessment  [x] Patient tolerated treatment well [] Patient limited by fatigue  [] Patient limited by pain  [] Patient limited by other medical complications  [] Other:     Prognosis: [x] Good [] Fair  [] Poor    Goals:    Short term goals  Time Frame for Short term goals: 3 Weeks  Short term goal 1: Pt will show independence in HEP  Short term goal 2: Pt will report no radicular symptoms in left LE      Long term goals  Time Frame for Long term goals : 6 Weeks  Long term goal 1: Pt will report no radicular symptoms and <4/10 pain consistently so pt can return to walking 1 mile and completing exercise program  Long term goal 2: Pt will show pain free left lumbar sidebend AROM to make ADLs easier  Long term goal 3: Pt will show normal left HS muscle length and pain free SLR on left  Long term goal 4: Pt will show 4+/5 left hip and knee strength     Patient Requires Follow-up: [x] Yes  [] No    Plan:   [] Continue per plan of care [] Alter current plan (see comments)  [x] Plan of care initiated [] Hold pending MD visit [] Discharge  Note: If patient does not return for scheduled/ recommended follow up visits, this note will serve as a discharge from care along with most recent update on progress.     Plan for Next Session:   Manual Above  Core strengthening  Modalities: MHP, US, EStim as needed     Electronically signed by:  Clark Ruvalcaba PT

## 2020-10-22 NOTE — PROGRESS NOTES
flexion. LE DTR: WNL  Joint Mobility  Spine: Pelvic Alignment is WNL. Leg Length: Left leg is 1 cm longer    Strength RLE  Strength RLE: WNL  Strength LLE  Strength LLE: WNL  Comment: Except Hip Flexion 3+/5, Knee Extension 4-/5     Additional Measures  Flexibility: 90/90 HS: Mild tightness on the left *some mild tension  Sensation  Overall Sensation Status: Impaired           Assessment   Conditions Requiring Skilled Therapeutic Intervention  Body structures, Functions, Activity limitations: Decreased functional mobility ; Decreased ADL status; Decreased ROM; Decreased strength; Increased pain  Assessment: PLOF- Mild back pain not affecting ADLs- pt able to work and exercise. CLOF- moderate to high amounts of pain affecting ability to work and complete ADLs  Treatment Diagnosis: Pain with neural tension.  Decreased lumbar left SB AROM and left hip/knee strength  Prognosis: Good;Fair  Decision Making: Medium Complexity  REQUIRES PT FOLLOW UP: Yes         Plan   Plan  Times per week: 2  Times per day: Daily  Plan weeks: 6  Current Treatment Recommendations: Strengthening, Home Exercise Program, ROM, Manual Therapy - Soft Tissue Mobilization, Manual Therapy - Joint Manipulation, Pain Management, Modalities    G-Code       OutComes Score                                                Goals  Short term goals  Time Frame for Short term goals: 3 Weeks  Short term goal 1: Pt will show independence in HEP  Short term goal 2: Pt will report no radicular symptoms in left LE  Long term goals  Time Frame for Long term goals : 6 Weeks  Long term goal 1: Pt will report no radicular symptoms and <4/10 pain consistently so pt can return to walking 1 mile and completing exercise program  Long term goal 2: Pt will show pain free left lumbar sidebend AROM to make ADLs easier  Long term goal 3: Pt will show normal left HS muscle length and pain free SLR on left  Long term goal 4: Pt will show 4+/5 left hip and knee strength  Patient Goals Patient goals : \" no pain so she can be more active with walking and exercise, prevent need of medication or injections for pain\"       Therapy Time   Individual Concurrent Group Co-treatment   Time In           Time Out           Minutes                   Mariel vizcaino, PT

## 2020-10-27 ENCOUNTER — APPOINTMENT (OUTPATIENT)
Dept: PHYSICAL THERAPY | Age: 60
End: 2020-10-27
Payer: COMMERCIAL

## 2020-10-28 ENCOUNTER — TELEPHONE (OUTPATIENT)
Dept: PRIMARY CARE CLINIC | Age: 60
End: 2020-10-28

## 2020-10-29 ENCOUNTER — HOSPITAL ENCOUNTER (OUTPATIENT)
Dept: PHYSICAL THERAPY | Age: 60
Setting detail: THERAPIES SERIES
Discharge: HOME | End: 2020-10-29
Payer: COMMERCIAL

## 2020-10-29 PROCEDURE — 97110 THERAPEUTIC EXERCISES: CPT

## 2020-10-29 PROCEDURE — 97140 MANUAL THERAPY 1/> REGIONS: CPT

## 2020-10-29 NOTE — FLOWSHEET NOTE
Physical Therapy Daily Treatment Note  Date:  10/29/2020    Patient Name:  Bradford Elizabeth    :  1960  MRN: [id number]    Restrictions/Precautions:     Pertinent Medical History: Sciatic L side, HTN, OA of R knee, Hernia Repair   Medical/Treatment Diagnosis Information:  · Diagnosis: DDD- Lumbar, Spondylolisthesis of lumbar  · Treatment Diagnosis: Pain with neural tension. Decreased lumbar left SB AROM and left hip/knee strength    Insurance/Certification information:  PT Insurance Information: [employer]  Physician Information:  Referring Practitioner: Liberty Abarca PA-C  Plan of care signed (Y/N):    Visit# / total visits:    Pain level: 8/10     Functional Outcomes Measure:  Test: Oswestry  Score:43 (CJ)    Progress Note: []  Yes  []  No  Next due by: Visit #10      History of Injury:   Pt states she has had back pain for a couple years from no known onset. Pain is 8/10. Pain is primarily in low back and has become worse in the past 3 months. Pt also gets some pain down into left upper leg with numbness but that occurs every couple days. Standing makes pain travel down to her leg if she does it for any period of time. Pt has noticed problems with her balance too because of it. Doesn't bother her driving. States she had epidural injections and it helped some. She takes gabapentin 3x per day. States she also has left knee pain that comes with the back pain but sometimes left knee just hurts. Goal is to decrease pain so she doesn't have to use medications or shots. Pt states she hopes to get back to workout program and walking a mile or two    Subjective:   10/29/20: Patient reports her back feels looser her numbness down the left leg still about the same.     Objective:   Observation:    Test measurements:      Exercises:  Exercise/Equipment Resistance/Repetitions Other comments   HSS 3 x 30 sec     PPT 5 sec x 10 Tactile cues required   TATA Attempted no change in le symptoms increase low back soreness    Hip flexor stretch 2 x 30 sec                    HEP     SKTC, Piriformis, and HS Stretch  10/29/20     PPT                           Other Therapeutic Activities:    Discussed purpose, risks and benefits of PT with pt who is agreeable to POC. Home Exercise Program:    Instructed patient on an HEP. Patient demonstrated exercises correctly. Handout with pictures and # of reps/sets was given. Exercises are listed above. Manual Treatments:    STM to lumbar paraspinals, lumbar PA mobilizations, Hip Distraction mobilization, Left HS Stretch x 25 min    Modalities:        Progression Towards Functional goals:  [] Patient is progressing as expected towards functional goals listed. [] Progression is slowed due to complexities listed. [] Progression has been slowed due to co-morbidities. [x] Plan just implemented, too soon to assess goals progression  [] Other:    Charges: Therapeutic Exercise:  [x] (02755) Provided verbal/tactile cueing for activities to restore or maintain strength, flexibility, endurance, ROM for improvements with self-care, mobility, lifting and ambulation. Neuromuscular Re-Education  [] (19651) Provided verbal/tactile cueing for activities to restore or maintain balance, coordination, kinesthetic sense, posture, motor skill, proprioception for self-care, mobility, lifting, and ambulation. Therapeutic Activities:    [x] (51274) Provided verbal/tactile cueing to address functional limitations related to loss of mobility, strength, balance, and coordination.      Gait Training:  [] (49495) Provided training and instruction to the patient for proper postural muscle recruitment and positioning with ambulation re-education     Home Exercise Program:    [x] (54698) Reviewed/Progressed HEP activities related to strengthening, flexibility, endurance, ROM for functional self-care, mobility, lifting and ambulation   [] (54356) Reviewed/Progressed HEP activities related to improving current plan (see comments)  [x] Plan of care initiated [] Hold pending MD visit [] Discharge  Note: If patient does not return for scheduled/ recommended follow up visits, this note will serve as a discharge from care along with most recent update on progress.     Plan for Next Session:   Manual Above  Core strengthening  Modalities: MHP, US, EStim as needed     Electronically signed by:  Waylon Daniel PTA

## 2020-11-02 ENCOUNTER — OFFICE VISIT (OUTPATIENT)
Dept: ORTHOPEDIC SURGERY | Age: 60
End: 2020-11-02
Payer: COMMERCIAL

## 2020-11-02 VITALS — WEIGHT: 163.2 LBS | TEMPERATURE: 97.3 F | HEIGHT: 67 IN | BODY MASS INDEX: 25.62 KG/M2

## 2020-11-02 PROCEDURE — 99213 OFFICE O/P EST LOW 20 MIN: CPT | Performed by: PHYSICIAN ASSISTANT

## 2020-11-02 NOTE — PROGRESS NOTES
New Patient: LUMBAR SPINE    Referring Provider:  No ref. provider found    CHIEF COMPLAINT:    Chief Complaint   Patient presents with    Back Pain     HISTORY OF PRESENT ILLNESS:     Ms. Gabby Neves  is a pleasant 64 y.o. female here for consultation regarding her LBP and L>R leg pain. She states her pain began insidiously about 3 years ago. Her pain has steadily increased since then. She rates her back pain 8/10 and leg pain 8/10. She describes the pain as shooting and intermittent that is worse with standing and lying down. The leg pain radiates down the lateral aspect of her leg to her foot. She admits numbness and tingling in her left leg. She denies progressive weakness of her leg and denies bowel or bladder dysfunction. She states she can sit for an unlimited amount of time and stand for a maximum 30 minutes. The pain significantly disrupts her sleep.      Current/Past Treatment:   · Physical Therapy: yes - mild relief  · Chiropractic:  none   · Injection:  Multiple - temporary relief  · Medications:  Gabapentin, oral steroids, flexeril     Past Medical History:   Past Medical History:   Diagnosis Date    Depression 12/17/2011    Headache(784.0) 6-7 months    pain in forehead    HTN (hypertension) 10/10/2011    Musculoskeletal back pain 1/10/2018    Primary osteoarthritis of right knee 1/10/2018    Sciatica of left side 8/27/2020      Past Surgical History:     Past Surgical History:   Procedure Laterality Date   [place], 1990    COLONOSCOPY  2003    problems with duodenum    HERNIA REPAIR      TUBAL LIGATION  1990    VENTRAL HERNIA REPAIR  12/03/2012    VENTRAL HERNIA REPAIR WITH MESH            Current Medications:     Current Outpatient Medications:     FLUoxetine (PROZAC) 20 MG capsule, Take 1 capsule by mouth daily, Disp: 30 capsule, Rfl: 3    olmesartan-hydrochlorothiazide (BENICAR HCT) 40-12.5 MG per tablet, Take 1 tablet by mouth daily, Disp: 90 tablet, Rfl: 1   gabapentin (NEURONTIN) 300 MG capsule, Take 1 capsule by mouth 3 times daily. For back pain, take all the time. , Disp: 90 capsule, Rfl: 5    dexlansoprazole (DEXILANT) 60 MG CPDR delayed release capsule, Take 1 capsule by mouth daily Discontinue omeprazole, Disp: 30 capsule, Rfl: 5    Cholecalciferol (VITAMIN D3) 125 MCG (5000 UT) TBDP, Take 1 each by mouth daily, Disp: 30 tablet, Rfl: 5    Blood Pressure Monitoring (BLOOD PRESSURE CUFF) MISC, by Does not apply route., Disp: 1 each, Rfl: 0  Allergies:  Patient has no known allergies. Social History:    reports that she quit smoking about 8 years ago. Her smoking use included cigarettes. She has a 9.25 pack-year smoking history. She has never used smokeless tobacco. She reports current alcohol use. She reports that she does not use drugs. Family History:   Family History   Problem Relation Age of Onset    Cancer Father     Diabetes Mother     High Blood Pressure Mother     Stroke Mother        REVIEW OF SYSTEMS: Full ROS noted & scanned   CONSTITUTIONAL: Denies unexplained weight loss, fevers, chills or fatigue  NEUROLOGICAL: Denies unsteady gait or progressive weakness  MUSCULOSKELETAL: Denies joint swelling or redness  PSYCHOLOGICAL: Denies anxiety, depression   SKIN: Denies skin changes, delayed healing, rash, itching   HEMATOLOGIC: Denies easy bleeding or bruising  ENDOCRINE: Denies excessive thirst, urination, heat/cold  RESPIRATORY: Denies current dyspnea, cough  GI: Denies nausea, vomiting, diarrhea   : Denies bowel or bladder issues      PHYSICAL EXAM:    Vitals: Temperature 97.3 °F (36.3 °C), temperature source Temporal, height 5' 7\" (1.702 m), weight 163 lb 3.2 oz (74 kg), not currently breastfeeding. GENERAL EXAM:  · General Apparence: Patient is adequately groomed with no evidence of malnutrition. · Orientation: The patient is oriented to time, place and person. · Mood & Affect:The patient's mood and affect are appropriate.   · Vascular: Examination reveals no swelling tenderness in upper or lower extremities. Good capillary refill. · Lymphatic: The lymphatic examination bilaterally reveals all areas to be without enlargement or induration  · Sensation: Sensation is intact without deficit  · Coordination/Balance: Good coordination     LUMBAR/SACRAL EXAMINATION:  · Inspection: Local inspection shows no step-off or bruising. Lumbar alignment is normal.  Sagittal and Coronal balance is neutral.      · Palpation:   No evidence of tenderness at the midline. No tenderness bilaterally at the paraspinal or trochanters. There is no step-off or paraspinal spasm. · Range of Motion: Lumbar flexion, extension and rotation are mildly limited due to pain. · Strength:   Strength testing is 5/5 in all muscle groups tested. · Special Tests:   Straight leg raise and crossed SLR negative. Leg length and pelvis level. · Skin: There are no rashes, ulcerations or lesions. · Reflexes: Reflexes are symmetrically 2+ at the patellar and ankle tendons. Clonus absent bilaterally at the feet. · Gait & station: normal, patient ambulates without assistance    · Additional Examinations:   · RIGHT LOWER EXTREMITY: Inspection/examination of the right lower extremity does not show any tenderness, deformity or injury. Range of motion is unremarkable. There is no gross instability. There are no rashes, ulcerations or lesions. Strength and tone are normal.    · LEFT LOWER EXTREMITY:  Inspection/examination of the left lower extremity does not show any tenderness, deformity or injury. Range of motion is unremarkable. There is no gross instability. There are no rashes, ulcerations or lesions. Strength and tone are normal.    Diagnostic Testing:    Reviewed AP and lateral xray images of her lumbar spine from 7/27/20 which show degenerative disc disease, most prominent L4-5 with facet arthropathy.      Reviewed Lumbar MRI from Feb 2018 which shows left paracentral disc None herniation L4-5 with degenerative disc disease L3 to the sacrum. Impression:   Lumbar HNP with radiculopathy    Plan:    She would like to have new lumbar MRI without contrast. She would like to return to the office to discuss options with Dr. Fermin Rodgers. May be interested in microlumbar decompression.     Omega Sage PA-C

## 2020-11-03 ENCOUNTER — HOSPITAL ENCOUNTER (OUTPATIENT)
Dept: PHYSICAL THERAPY | Age: 60
Setting detail: THERAPIES SERIES
Discharge: HOME | End: 2020-11-03
Payer: COMMERCIAL

## 2020-11-03 PROCEDURE — 97110 THERAPEUTIC EXERCISES: CPT

## 2020-11-03 PROCEDURE — 97140 MANUAL THERAPY 1/> REGIONS: CPT

## 2020-11-03 NOTE — FLOWSHEET NOTE
Physical Therapy Daily Treatment Note  Date:  11/3/2020    Patient Name:  Soo Walls    :  1960  MRN: [id number]    Restrictions/Precautions:     Pertinent Medical History: Sciatic L side, HTN, OA of R knee, Hernia Repair   Medical/Treatment Diagnosis Information:  · Diagnosis: DDD- Lumbar, Spondylolisthesis of lumbar  · Treatment Diagnosis: Pain with neural tension. Decreased lumbar left SB AROM and left hip/knee strength    Insurance/Certification information:  PT Insurance Information: José Gonzalez  Physician Information:  Referring Practitioner: Eleanor Monsivais PA-C  Plan of care signed (Y/N):    Visit# / total visits:  3/12  Pain level: 7/10     Functional Outcomes Measure:  Test: Oswestry  Score:43 (CJ)    Progress Note: []  Yes  []  No  Next due by: Visit #10      History of Injury:   Pt states she has had back pain for a couple years from no known onset. Pain is 8/10. Pain is primarily in low back and has become worse in the past 3 months. Pt also gets some pain down into left upper leg with numbness but that occurs every couple days. Standing makes pain travel down to her leg if she does it for any period of time. Pt has noticed problems with her balance too because of it. Doesn't bother her driving. States she had epidural injections and it helped some. She takes gabapentin 3x per day. States she also has left knee pain that comes with the back pain but sometimes left knee just hurts. Goal is to decrease pain so she doesn't have to use medications or shots. Pt states she hopes to get back to workout program and walking a mile or two    Subjective:     10/29/20: Patient reports her back feels looser her numbness down the left leg still about the same.   11/3/20: Pt states she is doing a little better today    Objective:   Observation:    Test measurements:      Exercises:  Exercise/Equipment Resistance/Repetitions Other comments   HSS 3 x 30 sec  B    PPT 10 sec x 10 Tactile cues required TATA X 1 min No radicular symptoms, increase   Hip flexor stretch     B Piriformis stretch 2 x 30\" B    Seated HS Stretch 2 x 30\" L         HEP     SKTC, Piriformis, and HS Stretch  10/29/20     PPT                           Other Therapeutic Activities:      Home Exercise Program:   11/3/20: Reviewed HEP    Manual Treatments:    STM to lumbar paraspinals, lumbar PA mobilizations, Hip Distraction mobilization,     Modalities:        Progression Towards Functional goals:  [] Patient is progressing as expected towards functional goals listed. [] Progression is slowed due to complexities listed. [] Progression has been slowed due to co-morbidities. [x] Plan just implemented, too soon to assess goals progression  [] Other:    Charges: Therapeutic Exercise:  [x] (90639) Provided verbal/tactile cueing for activities to restore or maintain strength, flexibility, endurance, ROM for improvements with self-care, mobility, lifting and ambulation. Neuromuscular Re-Education  [] (00953) Provided verbal/tactile cueing for activities to restore or maintain balance, coordination, kinesthetic sense, posture, motor skill, proprioception for self-care, mobility, lifting, and ambulation. Therapeutic Activities:    [x] (84686) Provided verbal/tactile cueing to address functional limitations related to loss of mobility, strength, balance, and coordination.      Gait Training:  [] (14358) Provided training and instruction to the patient for proper postural muscle recruitment and positioning with ambulation re-education     Home Exercise Program:    [x] (62861) Reviewed/Progressed HEP activities related to strengthening, flexibility, endurance, ROM for functional self-care, mobility, lifting and ambulation   [] (57276) Reviewed/Progressed HEP activities related to improving balance, coordination, kinesthetic sense, posture, motor skill, proprioception for self-care, mobility, lifting, and ambulation      Manual Treatments: MFR / STM / Oscillations-Mobs:  G-I, II, III, IV / Manipulation / MLD  [x] (63216) Provided manual therapy to mobilize  soft tissue/joints/fluid for the purpose of modulating pain, promoting relaxation, increasing ROM, reducing/eliminating soft tissue swelling/inflammation/restriction, improving soft tissue extensibility and allowing for proper ROM for normal function with self- care, mobility, lifting and ambulation.         Timed Code Treatment Minutes: 45   Total Treatment Minutes: 45     [] EVAL (LOW) 36925   [] EVAL (MOD) 82241   [] EVAL (HIGH) 53283   [] RE-EVAL   [x] TE (35119) x     [] Aquatic (23130) x  [] NMR (43932)   x  [] Aquatic Group (30097) x  [x] Manual (65706) x 2   [] Ultrasound (93854) x  [] TA (01074) x  [] Mech Traction (48106)  [] Ionto (89285)           [] ES (un) (84280):   [] Vasopump (72584) [] Other:      Assessment  [x] Patient tolerated treatment well [] Patient limited by fatigue  [] Patient limited by pain  [] Patient limited by other medical complications  [] Other:     Prognosis: [x] Good [] Fair  [] Poor    Goals:    Short term goals  Time Frame for Short term goals: 3 Weeks  Short term goal 1: Pt will show independence in HEP  Short term goal 2: Pt will report no radicular symptoms in left LE      Long term goals  Time Frame for Long term goals : 6 Weeks  Long term goal 1: Pt will report no radicular symptoms and <4/10 pain consistently so pt can return to walking 1 mile and completing exercise program  Long term goal 2: Pt will show pain free left lumbar sidebend AROM to make ADLs easier  Long term goal 3: Pt will show normal left HS muscle length and pain free SLR on left  Long term goal 4: Pt will show 4+/5 left hip and knee strength     Patient Requires Follow-up: [x] Yes  [] No    Plan:   [] Continue per plan of care [] Alter current plan (see comments)  [x] Plan of care initiated [] Hold pending MD visit [] Discharge  Note: If patient does not return for scheduled/ recommended follow up visits, this note will serve as a discharge from care along with most recent update on progress.     Plan for Next Session:   Manual Above  Core strengthening  Modalities: MHP, US, EStim as needed     Electronically signed by:  Suha Henriquez PT

## 2020-11-05 ENCOUNTER — HOSPITAL ENCOUNTER (OUTPATIENT)
Dept: PHYSICAL THERAPY | Age: 60
Setting detail: THERAPIES SERIES
Discharge: HOME | End: 2020-11-05
Payer: COMMERCIAL

## 2020-11-05 PROCEDURE — 97110 THERAPEUTIC EXERCISES: CPT

## 2020-11-05 PROCEDURE — 97140 MANUAL THERAPY 1/> REGIONS: CPT

## 2020-11-05 NOTE — FLOWSHEET NOTE
Physical Therapy Daily Treatment Note  Date:  2020    Patient Name:  Hussein Sanchez    :  1960  MRN: [id number]    Restrictions/Precautions:     Pertinent Medical History: Sciatic L side, HTN, OA of R knee, Hernia Repair   Medical/Treatment Diagnosis Information:  · Diagnosis: DDD- Lumbar, Spondylolisthesis of lumbar  · Treatment Diagnosis: Pain with neural tension. Decreased lumbar left SB AROM and left hip/knee strength    Insurance/Certification information:  PT Insurance Information: Kam[employer]ing[employer] [address]  Physician Information:  Referring Practitioner: Aline Sr PA-C  Plan of care signed (Y/N):    Visit# / total visits:    Pain level: 7/10     Functional Outcomes Measure:  Test: Oswestry  Score:43 (CJ)    Progress Note: []  Yes  []  No  Next due by: Visit #10      History of Injury:   Pt states she has had back pain for a couple years from no known onset. Pain is 8/10. Pain is primarily in low back and has become worse in the past 3 months. Pt also gets some pain down into left upper leg with numbness but that occurs every couple days. Standing makes pain travel down to her leg if she does it for any period of time. Pt has noticed problems with her balance too because of it. Doesn't bother her driving. States she had epidural injections and it helped some. She takes gabapentin 3x per day. States she also has left knee pain that comes with the back pain but sometimes left knee just hurts. Goal is to decrease pain so she doesn't have to use medications or shots. Pt states she hopes to get back to workout program and walking a mile or two    Subjective:     10/29/20: Patient reports her back feels looser her numbness down the left leg still about the same. 11/3/20: Pt states she is doing a little better today  20: Patient reports her back and leg feels about the same.     Objective:   Observation:    Test measurements:      Exercises:  Exercise/Equipment Resistance/Repetitions Other comments   HSS 3 x 30 sec  B    PPT 10 sec x 10 Tactile cues required   TATA X 1 min No radicular symptoms, increase   Hip flexor stretch     B Piriformis stretch 2 x 30\" B    Seated HS Stretch 2 x 30\" L         HEP     SKTC, Piriformis, and HS Stretch  10/29/20     PPT                           Other Therapeutic Activities:      Home Exercise Program:   11/3/20: Reviewed HEP    Manual Treatments:    STM to lumbar paraspinals, lumbar PA mobilizations, Hip Distraction mobilization,     Modalities:        Progression Towards Functional goals:  [] Patient is progressing as expected towards functional goals listed. [] Progression is slowed due to complexities listed. [] Progression has been slowed due to co-morbidities. [x] Plan just implemented, too soon to assess goals progression  [] Other:    Charges: Therapeutic Exercise:  [x] (86671) Provided verbal/tactile cueing for activities to restore or maintain strength, flexibility, endurance, ROM for improvements with self-care, mobility, lifting and ambulation. Neuromuscular Re-Education  [] (07577) Provided verbal/tactile cueing for activities to restore or maintain balance, coordination, kinesthetic sense, posture, motor skill, proprioception for self-care, mobility, lifting, and ambulation. Therapeutic Activities:    [x] (35274) Provided verbal/tactile cueing to address functional limitations related to loss of mobility, strength, balance, and coordination.      Gait Training:  [] (32130) Provided training and instruction to the patient for proper postural muscle recruitment and positioning with ambulation re-education     Home Exercise Program:    [x] (26591) Reviewed/Progressed HEP activities related to strengthening, flexibility, endurance, ROM for functional self-care, mobility, lifting and ambulation   [] (88768) Reviewed/Progressed HEP activities related to improving balance, coordination, kinesthetic sense, posture, motor skill, proprioception for Discharge  Note: If patient does not return for scheduled/ recommended follow up visits, this note will serve as a discharge from care along with most recent update on progress.     Plan for Next Session:   Manual Above  Core strengthening  Modalities: MHP, US, EStim as needed     Electronically signed by:  Sixto Sheppard PTA

## 2020-11-10 ENCOUNTER — HOSPITAL ENCOUNTER (OUTPATIENT)
Dept: PHYSICAL THERAPY | Age: 60
Setting detail: THERAPIES SERIES
Discharge: HOME | End: 2020-11-10
Payer: COMMERCIAL

## 2020-11-10 PROCEDURE — G0283 ELEC STIM OTHER THAN WOUND: HCPCS

## 2020-11-10 PROCEDURE — 97035 APP MDLTY 1+ULTRASOUND EA 15: CPT

## 2020-11-10 PROCEDURE — 97140 MANUAL THERAPY 1/> REGIONS: CPT

## 2020-11-10 PROCEDURE — 97110 THERAPEUTIC EXERCISES: CPT

## 2020-11-10 NOTE — FLOWSHEET NOTE
Physical Therapy Daily Treatment Note  Date:  11/10/2020    Patient Name:  Nathanael Quinones    :  1960  MRN: [id number]    Restrictions/Precautions:     Pertinent Medical History: Sciatic L side, HTN, OA of R knee, Hernia Repair   Medical/Treatment Diagnosis Information:  · Diagnosis: DDD- Lumbar, Spondylolisthesis of lumbar  · Treatment Diagnosis: Pain with neural tension. Decreased lumbar left SB AROM and left hip/knee strength  Insurance/Certification information:  PT Insurance Information: Donovan Kessler  Physician Information:  Referring Practitioner: Joi Kingston PA-C  Plan of care signed (Y/N):    Visit# / total visits:    Pain level: 810     Functional Outcomes Measure:  Test: Oswestry  Score:43 (CJ)    Progress Note: []  Yes  []  No  Next due by: Visit #10      History of Injury:   Pt states she has had back pain for a couple years from no known onset. Pain is 8/10. Pain is primarily in low back and has become worse in the past 3 months. Pt also gets some pain down into left upper leg with numbness but that occurs every couple days. Standing makes pain travel down to her leg if she does it for any period of time. Pt has noticed problems with her balance too because of it. Doesn't bother her driving. States she had epidural injections and it helped some. She takes gabapentin 3x per day. States she also has left knee pain that comes with the back pain but sometimes left knee just hurts. Goal is to decrease pain so she doesn't have to use medications or shots. Pt states she hopes to get back to workout program and walking a mile or two    Subjective:     10/29/20: Patient reports her back feels looser her numbness down the left leg still about the same. 11/3/20: Pt states she is doing a little better today  20: Patient reports her back and leg feels about the same. 11/10/20: States back is hurting today.     Objective:   Observation:    Test measurements: functional self-care, mobility, lifting and ambulation   [] (19304) Reviewed/Progressed HEP activities related to improving balance, coordination, kinesthetic sense, posture, motor skill, proprioception for self-care, mobility, lifting, and ambulation      Manual Treatments:  MFR / STM / Oscillations-Mobs:  G-I, II, III, IV / Manipulation / MLD  [x] (86776) Provided manual therapy to mobilize  soft tissue/joints/fluid for the purpose of modulating pain, promoting relaxation, increasing ROM, reducing/eliminating soft tissue swelling/inflammation/restriction, improving soft tissue extensibility and allowing for proper ROM for normal function with self- care, mobility, lifting and ambulation.         Timed Code Treatment Minutes: 45   Total Treatment Minutes: 45     [] EVAL (LOW) 95494   [] EVAL (MOD) 82859   [] EVAL (HIGH) 20784   [] RE-EVAL   [x] TE (79519) x     [] Aquatic (88547) x  [] NMR (18643)   x  [] Aquatic Group (41786) x  [x] Manual (60955) x 2   [] Ultrasound (83544) x  [] TA (30359) x  [] Mech Traction (71975)  [] Ionto (86804)           [x] ES (un) (71561):   [] Vasopump (75898) [] Other:      Assessment  [x] Patient tolerated treatment well [] Patient limited by fatigue  [] Patient limited by pain  [] Patient limited by other medical complications  [] Other:     Prognosis: [x] Good [] Fair  [] Poor    Goals:    Short term goals  Time Frame for Short term goals: 3 Weeks  Short term goal 1: Pt will show independence in HEP  Short term goal 2: Pt will report no radicular symptoms in left LE      Long term goals  Time Frame for Long term goals : 6 Weeks  Long term goal 1: Pt will report no radicular symptoms and <4/10 pain consistently so pt can return to walking 1 mile and completing exercise program  Long term goal 2: Pt will show pain free left lumbar sidebend AROM to make ADLs easier  Long term goal 3: Pt will show normal left HS muscle length and pain free SLR on left  Long term goal 4: Pt will show 4+/5 left hip and knee strength     Patient Requires Follow-up: [x] Yes  [] No    Plan:   [] Continue per plan of care [] Alter current plan (see comments)  [x] Plan of care initiated [] Hold pending MD visit [] Discharge  Note: If patient does not return for scheduled/ recommended follow up visits, this note will serve as a discharge from care along with most recent update on progress.     Plan for Next Session:   Manual Above  Core strengthening  Modalities: MHP, US, EStim as needed     Electronically signed by:  Josiah Beck PT

## 2020-11-12 ENCOUNTER — HOSPITAL ENCOUNTER (OUTPATIENT)
Dept: PHYSICAL THERAPY | Age: 60
Setting detail: THERAPIES SERIES
Discharge: HOME | End: 2020-11-12
Payer: COMMERCIAL

## 2020-11-12 PROCEDURE — 97140 MANUAL THERAPY 1/> REGIONS: CPT

## 2020-11-12 PROCEDURE — 97110 THERAPEUTIC EXERCISES: CPT

## 2020-11-12 PROCEDURE — G0283 ELEC STIM OTHER THAN WOUND: HCPCS

## 2020-11-12 NOTE — FLOWSHEET NOTE
Physical Therapy Daily Treatment Note  Date:  2020    Patient Name:  Jeny Quiles    :  1960  MRN: [id number]    Restrictions/Precautions:     Pertinent Medical History: Sciatic L side, HTN, OA of R knee, Hernia Repair   Medical/Treatment Diagnosis Information:  · Diagnosis: DDD- Lumbar, Spondylolisthesis of lumbar  · Treatment Diagnosis: Pain with neural tension. Decreased lumbar left SB AROM and left hip/knee strength  Insurance/Certification information:  PT Insurance Information: Nita [address]  Physician Information:  Referring Practitioner: Trinidad Arriaza PA-C  Plan of care signed (Y/N):    Visit# / total visits:    Pain level: 9/10     Functional Outcomes Measure:  Test: Oswestry  Score:43 (CJ)    Progress Note: []  Yes  []  No  Next due by: Visit #10      History of Injury:   Pt states she has had back pain for a couple years from no known onset. Pain is 8/10. Pain is primarily in low back and has become worse in the past 3 months. Pt also gets some pain down into left upper leg with numbness but that occurs every couple days. Standing makes pain travel down to her leg if she does it for any period of time. Pt has noticed problems with her balance too because of it. Doesn't bother her driving. States she had epidural injections and it helped some. She takes gabapentin 3x per day. States she also has left knee pain that comes with the back pain but sometimes left knee just hurts. Goal is to decrease pain so she doesn't have to use medications or shots. Pt states she hopes to get back to workout program and walking a mile or two    Subjective:     10/29/20: Patient reports her back feels looser her numbness down the left leg still about the same. 11/3/20: Pt states she is doing a little better today  20: Patient reports her back and leg feels about the same. 11/10/20: States back is hurting today. 20: Pt states she is doing better today, pain was down a little yesterday. Estim helped    Objective:   Observation:    Test measurements:      Exercises:  Exercise/Equipment Resistance/Repetitions Other comments   HSS 3 x 30 sec  B    PPT 10 sec x 10 Tactile cues required   Increase in radicular symptoms   Hip flexor stretch     B Piriformis stretch 2 x 30\" B    Seated HS Stretch          HEP     SKTC, Piriformis, and HS Stretch  10/29/20     PPT                           Other Therapeutic Activities:      Home Exercise Program:   11/3/20: Reviewed HEP    Manual Treatments:    STM to lumbar paraspinals, lumbar PA mobilizations, Hip Distraction mobilization    Modalities:   Estim: IFC with MHP x 15 min to lumbar. Supine  Add US if pt still having high amounts of pain       Progression Towards Functional goals:  [] Patient is progressing as expected towards functional goals listed. [] Progression is slowed due to complexities listed. [] Progression has been slowed due to co-morbidities. [x] Plan just implemented, too soon to assess goals progression  [] Other:    Charges: Therapeutic Exercise:  [x] (70114) Provided verbal/tactile cueing for activities to restore or maintain strength, flexibility, endurance, ROM for improvements with self-care, mobility, lifting and ambulation. Neuromuscular Re-Education  [] (42392) Provided verbal/tactile cueing for activities to restore or maintain balance, coordination, kinesthetic sense, posture, motor skill, proprioception for self-care, mobility, lifting, and ambulation. Therapeutic Activities:    [x] (09523) Provided verbal/tactile cueing to address functional limitations related to loss of mobility, strength, balance, and coordination.      Gait Training:  [] (43625) Provided training and instruction to the patient for proper postural muscle recruitment and positioning with ambulation re-education     Home Exercise Program:    [x] (27416) Reviewed/Progressed HEP activities related to strengthening, flexibility, endurance, ROM for functional self-care, mobility, lifting and ambulation   [] (04947) Reviewed/Progressed HEP activities related to improving balance, coordination, kinesthetic sense, posture, motor skill, proprioception for self-care, mobility, lifting, and ambulation      Manual Treatments:  MFR / STM / Oscillations-Mobs:  G-I, II, III, IV / Manipulation / MLD  [x] (38149) Provided manual therapy to mobilize  soft tissue/joints/fluid for the purpose of modulating pain, promoting relaxation, increasing ROM, reducing/eliminating soft tissue swelling/inflammation/restriction, improving soft tissue extensibility and allowing for proper ROM for normal function with self- care, mobility, lifting and ambulation.         Timed Code Treatment Minutes: 35   Total Treatment Minutes: 50     [] EVAL (LOW) 85550   [] EVAL (MOD) 00013   [] EVAL (HIGH) 68174   [] RE-EVAL   [x] TE (49139) x     [] Aquatic (06131) x  [] NMR (09207)   x  [] Aquatic Group (55716) x  [x] Manual (04064) x 1   [] Ultrasound (40888) x  [] TA (60889) x  [] Mech Traction (58906)  [] Ionto (03041)           [x] ES (un) (86729):   [] Vasopump (89271) [] Other:      Assessment  [x] Patient tolerated treatment well [] Patient limited by fatigue  [] Patient limited by pain  [] Patient limited by other medical complications  [] Other:     Prognosis: [x] Good [] Fair  [] Poor    Goals:    Short term goals  Time Frame for Short term goals: 3 Weeks  Short term goal 1: Pt will show independence in HEP  Short term goal 2: Pt will report no radicular symptoms in left LE      Long term goals  Time Frame for Long term goals : 6 Weeks  Long term goal 1: Pt will report no radicular symptoms and <4/10 pain consistently so pt can return to walking 1 mile and completing exercise program  Long term goal 2: Pt will show pain free left lumbar sidebend AROM to make ADLs easier  Long term goal 3: Pt will show normal left HS muscle length and pain free SLR on left  Long term goal 4: Pt will show 4+/5 left hip and knee strength     Patient Requires Follow-up: [x] Yes  [] No    Plan:   [x] Continue per plan of care [] Alter current plan (see comments)  [x] Plan of care initiated [] Hold pending MD visit [] Discharge  Note: If patient does not return for scheduled/ recommended follow up visits, this note will serve as a discharge from care along with most recent update on progress.     Plan for Next Session:   Manual Above  Core strengthening  Modalities: MHP, US, EStim as needed     Electronically signed by:  Harris Guzman PT

## 2020-11-17 ENCOUNTER — HOSPITAL ENCOUNTER (OUTPATIENT)
Dept: PHYSICAL THERAPY | Age: 60
Setting detail: THERAPIES SERIES
Discharge: HOME | End: 2020-11-17
Payer: COMMERCIAL

## 2020-11-17 PROCEDURE — G0283 ELEC STIM OTHER THAN WOUND: HCPCS

## 2020-11-17 PROCEDURE — 97140 MANUAL THERAPY 1/> REGIONS: CPT

## 2020-11-17 PROCEDURE — 97110 THERAPEUTIC EXERCISES: CPT

## 2020-11-17 NOTE — FLOWSHEET NOTE
Physical Therapy Daily Treatment Note  Date:  2020    Patient Name:  Mariel Mota    :  1960  MRN: [id number]    Restrictions/Precautions:     Pertinent Medical History: Sciatic L side, HTN, OA of R knee, Hernia Repair   Medical/Treatment Diagnosis Information:  · Diagnosis: DDD- Lumbar, Spondylolisthesis of lumbar  · Treatment Diagnosis: Pain with neural tension. Decreased lumbar left SB AROM and left hip/knee strength  Insurance/Certification information:  PT Insurance Information: [employer] [address] [address]  Physician Information:  Referring Practitioner: Rusty Rivera PA-C  Plan of care signed (Y/N):    Visit# / total visits:    Pain level: 5.5/10     Functional Outcomes Measure:  Test: Oswestry  Score:43 (CJ)    Progress Note: []  Yes  []  No  Next due by: Visit #10      History of Injury:   Pt states she has had back pain for a couple years from no known onset. Pain is 8/10. Pain is primarily in low back and has become worse in the past 3 months. Pt also gets some pain down into left upper leg with numbness but that occurs every couple days. Standing makes pain travel down to her leg if she does it for any period of time. Pt has noticed problems with her balance too because of it. Doesn't bother her driving. States she had epidural injections and it helped some. She takes gabapentin 3x per day. States she also has left knee pain that comes with the back pain but sometimes left knee just hurts. Goal is to decrease pain so she doesn't have to use medications or shots. Pt states she hopes to get back to workout program and walking a mile or two    Subjective:     10/29/20: Patient reports her back feels looser her numbness down the left leg still about the same. 11/3/20: Pt states she is doing a little better today  20: Patient reports her back and leg feels about the same. 11/10/20: States back is hurting today. 20: Pt states she is doing better today, pain was down a little yesterday. strengthening, flexibility, endurance, ROM for functional self-care, mobility, lifting and ambulation   [] (00201) Reviewed/Progressed HEP activities related to improving balance, coordination, kinesthetic sense, posture, motor skill, proprioception for self-care, mobility, lifting, and ambulation      Manual Treatments:  MFR / STM / Oscillations-Mobs:  G-I, II, III, IV / Manipulation / MLD  [x] (36530) Provided manual therapy to mobilize  soft tissue/joints/fluid for the purpose of modulating pain, promoting relaxation, increasing ROM, reducing/eliminating soft tissue swelling/inflammation/restriction, improving soft tissue extensibility and allowing for proper ROM for normal function with self- care, mobility, lifting and ambulation.         Timed Code Treatment Minutes: 45   Total Treatment Minutes: 60     [] EVAL (LOW) 39461   [] EVAL (MOD) 53450   [] EVAL (HIGH) 14482   [] RE-EVAL   [x] TE (41862) x     [] Aquatic (31704) x  [] NMR (35497)   x  [] Aquatic Group (00826) x  [x] Manual (36050) x 2  [] Ultrasound (82384) x  [] TA (71770) x  [] Mech Traction (77095)  [] Ionto (57078)           [x] ES (un) (68982):   [] Vasopump (19942) [] Other:      Assessment  [x] Patient tolerated treatment well [] Patient limited by fatigue  [] Patient limited by pain  [] Patient limited by other medical complications  [] Other:     Prognosis: [x] Good [] Fair  [] Poor    Goals:    Short term goals  Time Frame for Short term goals: 3 Weeks  Short term goal 1: Pt will show independence in HEP  Short term goal 2: Pt will report no radicular symptoms in left LE      Long term goals  Time Frame for Long term goals : 6 Weeks  Long term goal 1: Pt will report no radicular symptoms and <4/10 pain consistently so pt can return to walking 1 mile and completing exercise program  Long term goal 2: Pt will show pain free left lumbar sidebend AROM to make ADLs easier  Long term goal 3: Pt will show normal left HS muscle length and pain free SLR on left  Long term goal 4: Pt will show 4+/5 left hip and knee strength     Patient Requires Follow-up: [x] Yes  [] No    Plan:   [x] Continue per plan of care [] Alter current plan (see comments)  [] Plan of care initiated [] Hold pending MD visit [] Discharge  Note: If patient does not return for scheduled/ recommended follow up visits, this note will serve as a discharge from care along with most recent update on progress.     Plan for Next Session:   Manual Above  Core strengthening  Modalities: MHP, US, EStim as needed     Electronically signed by:  Yesenia Choudhury PT

## 2020-11-19 ENCOUNTER — HOSPITAL ENCOUNTER (OUTPATIENT)
Dept: PHYSICAL THERAPY | Age: 60
Setting detail: THERAPIES SERIES
Discharge: HOME | End: 2020-11-19
Payer: COMMERCIAL

## 2020-11-19 PROCEDURE — 97110 THERAPEUTIC EXERCISES: CPT

## 2020-11-19 PROCEDURE — G0283 ELEC STIM OTHER THAN WOUND: HCPCS

## 2020-11-19 PROCEDURE — 97140 MANUAL THERAPY 1/> REGIONS: CPT

## 2020-11-19 NOTE — FLOWSHEET NOTE
Physical Therapy Daily Treatment Note  Date:  2020    Patient Name:  Daron Herrera    :  1960  MRN: [id number]    Restrictions/Precautions:     Pertinent Medical History: Sciatic L side, HTN, OA of R knee, Hernia Repair   Medical/Treatment Diagnosis Information:  · Diagnosis: DDD- Lumbar, Spondylolisthesis of lumbar  · Treatment Diagnosis: Pain with neural tension. Decreased lumbar left SB AROM and left hip/knee strength  Insurance/Certification information:  PT Insurance Information: [address]  Physician Information:  Referring Practitioner: Matthews Cheadle, PA-C  Plan of care signed (Y/N):    Visit# / total visits:    Pain level: 2/10     Functional Outcomes Measure:  Test: Oswestry  Score:43 (Quinton Liner)    Progress Note: []  Yes  []  No  Next due by: Visit #10      History of Injury:   Pt states she has had back pain for a couple years from no known onset. Pain is 8/10. Pain is primarily in low back and has become worse in the past 3 months. Pt also gets some pain down into left upper leg with numbness but that occurs every couple days. Standing makes pain travel down to her leg if she does it for any period of time. Pt has noticed problems with her balance too because of it. Doesn't bother her driving. States she had epidural injections and it helped some. She takes gabapentin 3x per day. States she also has left knee pain that comes with the back pain but sometimes left knee just hurts. Goal is to decrease pain so she doesn't have to use medications or shots. Pt states she hopes to get back to workout program and walking a mile or two    Subjective:     10/29/20: Patient reports her back feels looser her numbness down the left leg still about the same. 11/3/20: Pt states she is doing a little better today  20: Patient reports her back and leg feels about the same. 11/10/20: States back is hurting today. 20: Pt states she is doing better today, pain was down a little yesterday. Estim helped  11/17/20: States she is doing a little better. 11/19/20: Pt states she is not hurting much today. Has some pain when she wakes up. Objective:   Observation:    Test measurements:      Exercises:  Exercise/Equipment Resistance/Repetitions Other comments   HSS 3 x 30 sec  B    PPT 10 sec x 10 Tactile cues required   Increase in radicular symptoms   Hip flexor stretch     B Piriformis stretch 2 x 30\" B    Seated HS Stretch          HEP     SKTC, Piriformis, and HS Stretch  10/29/20     PPT                           Other Therapeutic Activities:      Home Exercise Program:   11/3/20: Reviewed HEP    Manual Treatments:    STM to lumbar paraspinals, lumbar PA and rotation mobilizations, Hip Distraction mobilization    Modalities:   Estim: IFC with MHP x 15 min to lumbar. Supine  Add US if pt still having high amounts of pain       Progression Towards Functional goals:  [] Patient is progressing as expected towards functional goals listed. [] Progression is slowed due to complexities listed. [] Progression has been slowed due to co-morbidities. [x] Plan just implemented, too soon to assess goals progression  [] Other:    Charges: Therapeutic Exercise:  [x] (71078) Provided verbal/tactile cueing for activities to restore or maintain strength, flexibility, endurance, ROM for improvements with self-care, mobility, lifting and ambulation. Neuromuscular Re-Education  [] (31293) Provided verbal/tactile cueing for activities to restore or maintain balance, coordination, kinesthetic sense, posture, motor skill, proprioception for self-care, mobility, lifting, and ambulation. Therapeutic Activities:    [x] (70692) Provided verbal/tactile cueing to address functional limitations related to loss of mobility, strength, balance, and coordination.      Gait Training:  [] (54217) Provided training and instruction to the patient for proper postural muscle recruitment and positioning with ambulation re-education     Home Exercise Program:    [x] (67591) Reviewed/Progressed HEP activities related to strengthening, flexibility, endurance, ROM for functional self-care, mobility, lifting and ambulation   [] (24261) Reviewed/Progressed HEP activities related to improving balance, coordination, kinesthetic sense, posture, motor skill, proprioception for self-care, mobility, lifting, and ambulation      Manual Treatments:  MFR / STM / Oscillations-Mobs:  G-I, II, III, IV / Manipulation / MLD  [x] (97139) Provided manual therapy to mobilize  soft tissue/joints/fluid for the purpose of modulating pain, promoting relaxation, increasing ROM, reducing/eliminating soft tissue swelling/inflammation/restriction, improving soft tissue extensibility and allowing for proper ROM for normal function with self- care, mobility, lifting and ambulation.         Timed Code Treatment Minutes: 35   Total Treatment Minutes: 50     [] EVAL (LOW) 42124   [] EVAL (MOD) 84869   [] EVAL (HIGH) 66325   [] RE-EVAL   [x] TE (81700) x     [] Aquatic (91728) x  [] NMR (75917)   x  [] Aquatic Group (51925) x  [x] Manual (78398) x   [] Ultrasound (62625) x  [] TA (90544) x  [] Mech Traction (26258)  [] Ionto (19904)           [x] ES (un) (05736):   [] Vasopump (80966) [] Other:      Assessment  [x] Patient tolerated treatment well [] Patient limited by fatigue  [] Patient limited by pain  [] Patient limited by other medical complications  [] Other:     Prognosis: [x] Good [] Fair  [] Poor    Goals:    Short term goals  Time Frame for Short term goals: 3 Weeks  Short term goal 1: Pt will show independence in HEP  Short term goal 2: Pt will report no radicular symptoms in left LE      Long term goals  Time Frame for Long term goals : 6 Weeks  Long term goal 1: Pt will report no radicular symptoms and <4/10 pain consistently so pt can return to walking 1 mile and completing exercise program  Long term goal 2: Pt will show pain free left lumbar sidebend AROM to make ADLs easier  Long term goal 3: Pt will show normal left HS muscle length and pain free SLR on left  Long term goal 4: Pt will show 4+/5 left hip and knee strength     Patient Requires Follow-up: [x] Yes  [] No    Plan:   [x] Continue per plan of care [] Alter current plan (see comments)  [] Plan of care initiated [] Hold pending MD visit [] Discharge  Note: If patient does not return for scheduled/ recommended follow up visits, this note will serve as a discharge from care along with most recent update on progress.     Plan for Next Session:   Manual Above  Core strengthening  Modalities: MHP, US, EStim as needed     Electronically signed by:  Marilee Waggoner PT

## 2020-11-25 ENCOUNTER — TELEPHONE (OUTPATIENT)
Dept: ORTHOPEDIC SURGERY | Age: 60
End: 2020-11-25

## 2020-11-25 NOTE — TELEPHONE ENCOUNTER
Called and spoke to patient letting her know the MRI was approved. She was given the information for [facility] of [facility]. She will call to get schedule and everything was faxed over to that site. She was also informed that we will call her with the results once we get them in and read. She knows this can take 4 days after the scan is done and completed before she hears from us.  She will call with any questions in the meantime    Location: [facility]     Ph: aNbil # [phone]

## 2020-12-01 ENCOUNTER — HOSPITAL ENCOUNTER (OUTPATIENT)
Dept: PHYSICAL THERAPY | Age: 60
Setting detail: THERAPIES SERIES
Discharge: HOME | End: 2020-12-01
Payer: COMMERCIAL

## 2020-12-02 ENCOUNTER — OFFICE VISIT (OUTPATIENT)
Dept: PRIMARY CARE CLINIC | Age: 60
End: 2020-12-02
Payer: COMMERCIAL

## 2020-12-02 VITALS
HEART RATE: 74 BPM | OXYGEN SATURATION: 99 % | BODY MASS INDEX: 24.64 KG/M2 | RESPIRATION RATE: 16 BRPM | WEIGHT: 157 LBS | DIASTOLIC BLOOD PRESSURE: 80 MMHG | HEIGHT: 67 IN | SYSTOLIC BLOOD PRESSURE: 126 MMHG | TEMPERATURE: 96.8 F

## 2020-12-02 PROCEDURE — 90686 IIV4 VACC NO PRSV 0.5 ML IM: CPT | Performed by: INTERNAL MEDICINE

## 2020-12-02 PROCEDURE — 99396 PREV VISIT EST AGE 40-64: CPT | Performed by: INTERNAL MEDICINE

## 2020-12-02 PROCEDURE — 90471 IMMUNIZATION ADMIN: CPT | Performed by: INTERNAL MEDICINE

## 2020-12-02 ASSESSMENT — ENCOUNTER SYMPTOMS
TROUBLE SWALLOWING: 0
CONSTIPATION: 0
BLOOD IN STOOL: 0
RECTAL PAIN: 0
EYE DISCHARGE: 0
VOMITING: 0
APNEA: 0
DIARRHEA: 0
SHORTNESS OF BREATH: 0
COUGH: 0
SORE THROAT: 0
RHINORRHEA: 0
CHEST TIGHTNESS: 0
PHOTOPHOBIA: 0
BACK PAIN: 1
WHEEZING: 0
ANAL BLEEDING: 0
ABDOMINAL PAIN: 0
EYE REDNESS: 0
STRIDOR: 0
CHOKING: 0
COLOR CHANGE: 0
VOICE CHANGE: 0
ABDOMINAL DISTENTION: 0
EYE PAIN: 0
EYE ITCHING: 0
NAUSEA: 0

## 2020-12-02 ASSESSMENT — PATIENT HEALTH QUESTIONNAIRE - PHQ9
SUM OF ALL RESPONSES TO PHQ QUESTIONS 1-9: 2
2. FEELING DOWN, DEPRESSED OR HOPELESS: 1
SUM OF ALL RESPONSES TO PHQ9 QUESTIONS 1 & 2: 2
1. LITTLE INTEREST OR PLEASURE IN DOING THINGS: 1

## 2020-12-02 NOTE — PROGRESS NOTES
2020    Oxana Eng (:  1960) is a 64 y.o. female, here for a preventive medicine evaluation. Patient Active Problem List   Diagnosis    Nail fungus    Menopause syndrome    Essential hypertension    Pre-ulcerative corn or callous    Microscopic hematuria    Gastroesophageal reflux disease with esophagitis    Acute medial meniscal tear, right, initial encounter    Musculoskeletal back pain    DDD (degenerative disc disease), lumbar    Primary osteoarthritis of right knee    Neural foraminal stenosis of lumbar spine    Chronic pain of right knee    Pain of both hip joints    Trochanteric bursitis of both hips    Sciatica of left side    Lumbar strain    Spondylolisthesis of lumbar region       Review of Systems   Constitutional: Negative. Negative for activity change, appetite change, chills, diaphoresis, fatigue and fever. HENT: Negative for dental problem, ear pain, hearing loss, mouth sores, nosebleeds, postnasal drip, rhinorrhea, sore throat, trouble swallowing and voice change. Eyes: Negative for photophobia, pain, discharge, redness, itching and visual disturbance. Respiratory: Negative for apnea, cough, choking, chest tightness, shortness of breath, wheezing and stridor. Cardiovascular: Negative for chest pain, palpitations and leg swelling. Hypertension   Gastrointestinal: Negative for abdominal distention, abdominal pain, anal bleeding, blood in stool, constipation, diarrhea, nausea, rectal pain and vomiting. Gastroesophageal reflux disease stable. Genitourinary: Negative for dysuria, flank pain, frequency, hematuria and pelvic pain. Musculoskeletal: Positive for back pain. Negative for arthralgias, gait problem, joint swelling, myalgias, neck pain and neck stiffness. Skin: Negative for color change, pallor, rash and wound.    Neurological: Negative for dizziness, tremors, seizures, syncope, facial asymmetry, speech difficulty, weakness, light-headedness, numbness and headaches. Hematological: Negative for adenopathy. Does not bruise/bleed easily. Psychiatric/Behavioral: Negative for agitation, behavioral problems, confusion, decreased concentration, dysphoric mood, hallucinations, self-injury, sleep disturbance and suicidal ideas. The patient is not nervous/anxious and is not hyperactive. Prior to Visit Medications    Medication Sig Taking? Authorizing Provider   FLUoxetine (PROZAC) 20 MG capsule Take 1 capsule by mouth daily Yes Ailyn Devries MD   olmesartan-hydrochlorothiazide (BENICAR HCT) 40-12.5 MG per tablet Take 1 tablet by mouth daily Yes Ailyn Devries MD   gabapentin (NEURONTIN) 300 MG capsule Take 1 capsule by mouth 3 times daily. For back pain, take all the time. Yes Ailyn Devries MD   dexlansoprazole (DEXILANT) 60 MG CPDR delayed release capsule Take 1 capsule by mouth daily Discontinue omeprazole Yes Ailyn Devries MD   Cholecalciferol (VITAMIN D3) 125 MCG (5000 UT) TBDP Take 1 each by mouth daily Yes Ailyn Devries MD   Blood Pressure Monitoring (BLOOD PRESSURE CUFF) MISC by Does not apply route.  Yes VERONICA Barragan - CNP        No Known Allergies    Past Medical History:   Diagnosis Date    Depression 12/17/2011    Headache(784.0) 6-7 months    pain in forehead    HTN (hypertension) 10/10/2011    Musculoskeletal back pain 1/10/2018    Primary osteoarthritis of right knee 1/10/2018    Sciatica of left side 8/27/2020       Past Surgical History:   Procedure Laterality Date   Jake 1990    COLONOSCOPY  2003    problems with duodenum    HERNIA [facility]  12/03/2012    VENTRAL HERNIA REPAIR WITH MESH              Social History     Socioeconomic History    Marital status:      Spouse name: Not on file    Number of children: Not on file    Years of education: Not on file    Highest education level: Not on file   Occupational History    Not on file   Social Needs    Financial resource strain: Not on file    Food insecurity     Worry: Not on file     Inability: Not on file    Transportation needs     Medical: Not on file     Non-medical: Not on file   Tobacco Use    Smoking status: Former Smoker     Packs/day: 0.25     Years: 37.00     Pack years: 9.25     Types: Cigarettes     Last attempt to quit: 2012     Years since quittin.7    Smokeless tobacco: Never Used    Tobacco comment: smoke when nerves get bad   Substance and Sexual Activity    Alcohol use:  Yes     Alcohol/week: 0.0 standard drinks     Comment: 2x a week    Drug use: No    Sexual activity: Yes     Partners: Male   Lifestyle    Physical activity     Days per week: Not on file     Minutes per session: Not on file    Stress: Not on file   Relationships    Social connections     Talks on phone: Not on file     Gets together: Not on file     Attends [employer] service: Not on file     Active member of club or organization: Not on file     Attends meetings of clubs or organizations: Not on file     Relationship status: Not on file    Intimate partner violence     Fear of current or ex partner: Not on file     Emotionally abused: Not on file     Physically abused: Not on file     Forced sexual activity: Not on file   Other Topics Concern    Not on file   Social History Narrative    Not on file        Family History   Problem Relation Age of Onset    Cancer Father     Diabetes Mother     High Blood Pressure Mother     Stroke Mother        ADVANCE DIRECTIVE: N, <no information>    Vitals:    20 1317   BP: 126/80   Site: Right Upper Arm   Position: Sitting   Cuff Size: Medium Adult   Pulse: 74   Resp: 16   Temp: 96.8 °F (36 °C)   TempSrc: Tympanic   SpO2: 99%   Weight: 157 lb (71.2 kg)   Height: 5' 7\" (1.702 m)     Estimated body mass index is 24.59 kg/m² as calculated from the following:    Height as of this encounter: 5' 7\" (1.702 m). Weight as of this encounter: 157 lb (71.2 kg). Physical Exam  Constitutional:       General: She is not in acute distress. Appearance: She is well-developed. She is not diaphoretic. HENT:      Head: Normocephalic and atraumatic. Right Ear: External ear normal.      Left Ear: External ear normal.      Nose: Nose normal.      Mouth/Throat:      Pharynx: No oropharyngeal exudate. Eyes:      General: No scleral icterus. Right eye: No discharge. Left eye: No discharge. Extraocular Movements: Extraocular movements intact. Conjunctiva/sclera: Conjunctivae normal.      Pupils: Pupils are equal, round, and reactive to light. Neck:      Musculoskeletal: Normal range of motion and neck supple. Thyroid: No thyromegaly. Vascular: No JVD. Trachea: No tracheal deviation. Cardiovascular:      Rate and Rhythm: Normal rate and regular rhythm. Pulses:           Carotid pulses are 0 on the right side and 0 on the left side. Heart sounds: Normal heart sounds. No murmur. No gallop. Pulmonary:      Effort: Pulmonary effort is normal. No respiratory distress. Breath sounds: Normal breath sounds. No wheezing or rales. Chest:      Chest wall: No tenderness. Abdominal:      General: Bowel sounds are normal. There is no distension. Palpations: Abdomen is soft. There is no mass. Tenderness: There is no abdominal tenderness. There is no guarding or rebound. Musculoskeletal: Normal range of motion. General: No tenderness. Lymphadenopathy:      Cervical: No cervical adenopathy. Comments: No adenopathy of cervical, supraclavicular, axillary or inguinal nodes. Skin:     General: Skin is warm and dry. Coloration: Skin is not pale. Findings: No erythema or rash. Neurological:      Mental Status: She is alert and oriented to person, place, and time.       Cranial Nerves: No cranial nerve

## 2020-12-03 ENCOUNTER — HOSPITAL ENCOUNTER (OUTPATIENT)
Dept: PHYSICAL THERAPY | Age: 60
Setting detail: THERAPIES SERIES
Discharge: HOME | End: 2020-12-03
Payer: COMMERCIAL

## 2020-12-03 PROCEDURE — 97140 MANUAL THERAPY 1/> REGIONS: CPT

## 2020-12-03 PROCEDURE — 97110 THERAPEUTIC EXERCISES: CPT

## 2020-12-03 PROCEDURE — G0283 ELEC STIM OTHER THAN WOUND: HCPCS

## 2020-12-03 NOTE — FLOWSHEET NOTE
Physical Therapy Daily Treatment Note  Date:  12/3/2020    Patient Name:  Ashu Peterson    :  1960  MRN: [id number]    Restrictions/Precautions:     Pertinent Medical History: Sciatic L side, HTN, OA of R knee, Hernia Repair   Medical/Treatment Diagnosis Information:  · Diagnosis: DDD- Lumbar, Spondylolisthesis of lumbar  · Treatment Diagnosis: Pain with neural tension. Decreased lumbar left SB AROM and left hip/knee strength  Insurance/Certification information:  PT Insurance Information: [address]  Physician Information:  Referring Practitioner: Christine Botello PA-C  Plan of care signed (Y/N):    Visit# / total visits:    Pain level: 6/10     Functional Outcomes Measure:  Test: Oswestry  Score:43 (Stephanie Simmons)    Progress Note: []  Yes  []  No  Next due by: Visit #10      History of Injury:   Pt states she has had back pain for a couple years from no known onset. Pain is 8/10. Pain is primarily in low back and has become worse in the past 3 months. Pt also gets some pain down into left upper leg with numbness but that occurs every couple days. Standing makes pain travel down to her leg if she does it for any period of time. Pt has noticed problems with her balance too because of it. Doesn't bother her driving. States she had epidural injections and it helped some. She takes gabapentin 3x per day. States she also has left knee pain that comes with the back pain but sometimes left knee just hurts. Goal is to decrease pain so she doesn't have to use medications or shots. Pt states she hopes to get back to workout program and walking a mile or two    Subjective:     10/29/20: Patient reports her back feels looser her numbness down the left leg still about the same. 11/3/20: Pt states she is doing a little better today  20: Patient reports her back and leg feels about the same. 11/10/20: States back is hurting today. 20: Pt states she is doing better today, pain was down a little yesterday. Estim helped  11/17/20: States she is doing a little better. 11/19/20: Pt states she is not hurting much today. Has some pain when she wakes up.   12/3/20: States 4 days ago she started having some more back pain    Objective:   Observation:    Test measurements:      Exercises:  Exercise/Equipment Resistance/Repetitions Other comments   HSS 3 x 30 sec  B    PPT 10 sec x 10 Tactile cues required   Increase in radicular symptoms   Hip flexor stretch     B Piriformis stretch 2 x 30\" B    Seated HS Stretch          HEP     SKTC, Piriformis, and HS Stretch  10/29/20     PPT                           Other Therapeutic Activities:      Home Exercise Program:   11/3/20: Reviewed HEP    Manual Treatments:    STM to lumbar paraspinals, lumbar PA and rotation mobilizations, Hip Distraction mobilization    Modalities:   Estim: IFC with MHP x 15 min to lumbar. Supine  Add US if pt still having high amounts of pain       Progression Towards Functional goals:  [] Patient is progressing as expected towards functional goals listed. [] Progression is slowed due to complexities listed. [] Progression has been slowed due to co-morbidities. [x] Plan just implemented, too soon to assess goals progression  [] Other:    Charges: Therapeutic Exercise:  [x] (50382) Provided verbal/tactile cueing for activities to restore or maintain strength, flexibility, endurance, ROM for improvements with self-care, mobility, lifting and ambulation. Neuromuscular Re-Education  [] (97943) Provided verbal/tactile cueing for activities to restore or maintain balance, coordination, kinesthetic sense, posture, motor skill, proprioception for self-care, mobility, lifting, and ambulation. Therapeutic Activities:    [x] (38499) Provided verbal/tactile cueing to address functional limitations related to loss of mobility, strength, balance, and coordination.      Gait Training:  [] (05997) Provided training and instruction to the patient for proper postural muscle recruitment and positioning with ambulation re-education     Home Exercise Program:    [x] (75966) Reviewed/Progressed HEP activities related to strengthening, flexibility, endurance, ROM for functional self-care, mobility, lifting and ambulation   [] (78787) Reviewed/Progressed HEP activities related to improving balance, coordination, kinesthetic sense, posture, motor skill, proprioception for self-care, mobility, lifting, and ambulation      Manual Treatments:  MFR / STM / Oscillations-Mobs:  G-I, II, III, IV / Manipulation / MLD  [x] (01470) Provided manual therapy to mobilize  soft tissue/joints/fluid for the purpose of modulating pain, promoting relaxation, increasing ROM, reducing/eliminating soft tissue swelling/inflammation/restriction, improving soft tissue extensibility and allowing for proper ROM for normal function with self- care, mobility, lifting and ambulation.         Timed Code Treatment Minutes: 43   Total Treatment Minutes: 60     [] EVAL (LOW) 58879   [] EVAL (MOD) 70906   [] EVAL (HIGH) 35841   [] RE-EVAL   [x] TE (38082) x     [] Aquatic (33991) x  [] NMR (68482)   x  [] Aquatic Group (86617) x  [x] Manual (09083) x 2   [] Ultrasound (04683) x  [] TA (94437) x  [] Mech Traction (45419)  [] Ionto (39333)           [x] ES (un) (50524):   [] Vasopump (35094) [] Other:      Assessment  [x] Patient tolerated treatment well [] Patient limited by fatigue  [] Patient limited by pain  [] Patient limited by other medical complications  [] Other:     Prognosis: [x] Good [] Fair  [] Poor    Goals:    Short term goals  Time Frame for Short term goals: 3 Weeks  Short term goal 1: Pt will show independence in HEP  Short term goal 2: Pt will report no radicular symptoms in left LE      Long term goals  Time Frame for Long term goals : 6 Weeks  Long term goal 1: Pt will report no radicular symptoms and <4/10 pain consistently so pt can return to walking 1 mile and completing exercise program  Long term goal 2: Pt will show pain free left lumbar sidebend AROM to make ADLs easier  Long term goal 3: Pt will show normal left HS muscle length and pain free SLR on left  Long term goal 4: Pt will show 4+/5 left hip and knee strength     Patient Requires Follow-up: [x] Yes  [] No    Plan:   [x] Continue per plan of care [] Alter current plan (see comments)  [] Plan of care initiated [] Hold pending MD visit [] Discharge  Note: If patient does not return for scheduled/ recommended follow up visits, this note will serve as a discharge from care along with most recent update on progress.     Plan for Next Session:   Manual Above  Core strengthening  Modalities: MHP, US, EStim as needed     Electronically signed by:  Harris Guzman PT

## 2020-12-08 ENCOUNTER — HOSPITAL ENCOUNTER (OUTPATIENT)
Dept: PHYSICAL THERAPY | Age: 60
Setting detail: THERAPIES SERIES
Discharge: HOME | End: 2020-12-08
Payer: COMMERCIAL

## 2020-12-08 PROCEDURE — 97140 MANUAL THERAPY 1/> REGIONS: CPT

## 2020-12-08 PROCEDURE — G0283 ELEC STIM OTHER THAN WOUND: HCPCS

## 2020-12-08 PROCEDURE — 97110 THERAPEUTIC EXERCISES: CPT

## 2020-12-08 NOTE — FLOWSHEET NOTE
Physical Therapy Daily Treatment Note  Date:  2020    Patient Name:  Irma Coe    :  1960  MRN: [id number]    Restrictions/Precautions:     Pertinent Medical History: Sciatic L side, HTN, OA of R knee, Hernia Repair   Medical/Treatment Diagnosis Information:  · Diagnosis: DDD- Lumbar, Spondylolisthesis of lumbar  · Treatment Diagnosis: Pain with neural tension. Decreased lumbar left SB AROM and left hip/knee strength  Insurance/Certification information:  PT Insurance Information: [address]  Physician Information:  Referring Practitioner: Delfino Acevedo PA-C  Plan of care signed (Y/N):    Visit# / total visits:  10/12  Pain level: 6/10     Functional Outcomes Measure:  Test: Oswestry  Score:43 (Jedkarsten Hunter)    Progress Note: []  Yes  []  No  Next due by: Visit #10      History of Injury:   Pt states she has had back pain for a couple years from no known onset. Pain is 8/10. Pain is primarily in low back and has become worse in the past 3 months. Pt also gets some pain down into left upper leg with numbness but that occurs every couple days. Standing makes pain travel down to her leg if she does it for any period of time. Pt has noticed problems with her balance too because of it. Doesn't bother her driving. States she had epidural injections and it helped some. She takes gabapentin 3x per day. States she also has left knee pain that comes with the back pain but sometimes left knee just hurts. Goal is to decrease pain so she doesn't have to use medications or shots. Pt states she hopes to get back to workout program and walking a mile or two    Subjective:     10/29/20: Patient reports her back feels looser her numbness down the left leg still about the same. 11/3/20: Pt states she is doing a little better today  20: Patient reports her back and leg feels about the same. 11/10/20: States back is hurting today. 20: Pt states she is doing better today, pain was down a little yesterday. Estim helped  11/17/20: States she is doing a little better. 11/19/20: Pt states she is not hurting much today. Has some pain when she wakes up.   12/3/20: States 4 days ago she started having some more back pain  12/08/20: Patient reports her back has  a little more sore,due to increased activity over the weekend. Objective:   Observation:    Test measurements:      Exercises:  Exercise/Equipment Resistance/Repetitions Other comments   HSS 3 x 30 sec  B    PPT 10 sec x 10 Tactile cues required   Increase in radicular symptoms   Hip flexor stretch     B Piriformis stretch 2 x 30\" B    Seated HS Stretch          HEP     SKTC, Piriformis, and HS Stretch  10/29/20     PPT                           Other Therapeutic Activities:      Home Exercise Program:   11/3/20: Reviewed HEP    Manual Treatments:    STM to lumbar paraspinals, lumbar PA and rotation mobilizations, Hip Distraction mobilization    Modalities:   Estim: IFC with MHP x 15 min to lumbar. Supine  Add US if pt still having high amounts of pain       Progression Towards Functional goals:  [] Patient is progressing as expected towards functional goals listed. [] Progression is slowed due to complexities listed. [] Progression has been slowed due to co-morbidities. [x] Plan just implemented, too soon to assess goals progression  [] Other:    Charges: Therapeutic Exercise:  [x] (09867) Provided verbal/tactile cueing for activities to restore or maintain strength, flexibility, endurance, ROM for improvements with self-care, mobility, lifting and ambulation. Neuromuscular Re-Education  [] (51010) Provided verbal/tactile cueing for activities to restore or maintain balance, coordination, kinesthetic sense, posture, motor skill, proprioception for self-care, mobility, lifting, and ambulation.      Therapeutic Activities:    [x] (94502) Provided verbal/tactile cueing to address functional limitations related to loss of mobility, strength, balance, and coordination. Gait Training:  [] (89586) Provided training and instruction to the patient for proper postural muscle recruitment and positioning with ambulation re-education     Home Exercise Program:    [x] (85691) Reviewed/Progressed HEP activities related to strengthening, flexibility, endurance, ROM for functional self-care, mobility, lifting and ambulation   [] (92835) Reviewed/Progressed HEP activities related to improving balance, coordination, kinesthetic sense, posture, motor skill, proprioception for self-care, mobility, lifting, and ambulation      Manual Treatments:  MFR / STM / Oscillations-Mobs:  G-I, II, III, IV / Manipulation / MLD  [x] (90818) Provided manual therapy to mobilize  soft tissue/joints/fluid for the purpose of modulating pain, promoting relaxation, increasing ROM, reducing/eliminating soft tissue swelling/inflammation/restriction, improving soft tissue extensibility and allowing for proper ROM for normal function with self- care, mobility, lifting and ambulation.         Timed Code Treatment Minutes: 43   Total Treatment Minutes: 60     [] EVAL (LOW) 02627   [] EVAL (MOD) 41427   [] EVAL (HIGH) 75252   [] RE-EVAL   [x] TE (04577) x     [] Aquatic (49460) x  [] NMR (13357)   x  [] Aquatic Group (92073) x  [x] Manual (84046) x 2   [] Ultrasound (12861) x  [] TA (65387) x  [] Mech Traction (48011)  [] Ionto (58987)           [x] ES (un) (13038):   [] Vasopump (19519) [] Other:      Assessment  [x] Patient tolerated treatment well [] Patient limited by fatigue  [] Patient limited by pain  [] Patient limited by other medical complications  [] Other:     Prognosis: [x] Good [] Fair  [] Poor    Goals:    Short term goals  Time Frame for Short term goals: 3 Weeks  Short term goal 1: Pt will show independence in HEP  Short term goal 2: Pt will report no radicular symptoms in left LE      Long term goals  Time Frame for Long term goals : 6 Weeks  Long term goal 1: Pt will report no radicular symptoms and <4/10 pain consistently so pt can return to walking 1 mile and completing exercise program  Long term goal 2: Pt will show pain free left lumbar sidebend AROM to make ADLs easier  Long term goal 3: Pt will show normal left HS muscle length and pain free SLR on left  Long term goal 4: Pt will show 4+/5 left hip and knee strength     Patient Requires Follow-up: [x] Yes  [] No    Plan:   [x] Continue per plan of care [] Alter current plan (see comments)  [] Plan of care initiated [] Hold pending MD visit [] Discharge  Note: If patient does not return for scheduled/ recommended follow up visits, this note will serve as a discharge from care along with most recent update on progress.     Plan for Next Session:   Manual Above  Core strengthening  Modalities: MHP, US, EStim as needed     Electronically signed by:  Michael Church PTA

## 2020-12-10 ENCOUNTER — HOSPITAL ENCOUNTER (OUTPATIENT)
Dept: PHYSICAL THERAPY | Age: 60
Setting detail: THERAPIES SERIES
Discharge: HOME | End: 2020-12-10
Payer: COMMERCIAL

## 2020-12-10 PROCEDURE — 97110 THERAPEUTIC EXERCISES: CPT

## 2020-12-10 PROCEDURE — 97140 MANUAL THERAPY 1/> REGIONS: CPT

## 2020-12-10 PROCEDURE — G0283 ELEC STIM OTHER THAN WOUND: HCPCS

## 2020-12-10 NOTE — FLOWSHEET NOTE
Physical Therapy Daily Treatment Note  Date:  12/10/2020    Patient Name:  Kenneth Kerr    :  1960  MRN: [id number]    Restrictions/Precautions:     Pertinent Medical History: Sciatic L side, HTN, OA of R knee, Hernia Repair   Medical/Treatment Diagnosis Information:  · Diagnosis: DDD- Lumbar, Spondylolisthesis of lumbar  · Treatment Diagnosis: Pain with neural tension. Decreased lumbar left SB AROM and left hip/knee strength  Insurance/Certification information:  PT Insurance Information: [address]  Physician Information:  Referring Practitioner: Brandt Delgadillo PA-C  Plan of care signed (Y/N):    Visit# / total visits:    Pain level: 6-7/10     Functional Outcomes Measure:  Test: Oswestry  Score:43 (CJ)    Progress Note: []  Yes  []  No  Next due by: Visit #10      History of Injury:   Pt states she has had back pain for a couple years from no known onset. Pain is 8/10. Pain is primarily in low back and has become worse in the past 3 months. Pt also gets some pain down into left upper leg with numbness but that occurs every couple days. Standing makes pain travel down to her leg if she does it for any period of time. Pt has noticed problems with her balance too because of it. Doesn't bother her driving. States she had epidural injections and it helped some. She takes gabapentin 3x per day. States she also has left knee pain that comes with the back pain but sometimes left knee just hurts. Goal is to decrease pain so she doesn't have to use medications or shots. Pt states she hopes to get back to workout program and walking a mile or two    Subjective:     10/29/20: Patient reports her back feels looser her numbness down the left leg still about the same. 11/3/20: Pt states she is doing a little better today  20: Patient reports her back and leg feels about the same. 11/10/20: States back is hurting today. 20: Pt states she is doing better today, pain was down a little yesterday. Estim helped  11/17/20: States she is doing a little better. 11/19/20: Pt states she is not hurting much today. Has some pain when she wakes up.   12/3/20: States 4 days ago she started having some more back pain  12/08/20: Patient reports her back has  a little more sore,due to increased activity over the weekend. 12/10/20: Patient reports back is still waking her up at night. States pain is mainly across the low back more on the left. Objective:   Observation:    Test measurements:      Exercises:  Exercise/Equipment Resistance/Repetitions Other comments   HSS 3 x 30 sec  B    PPT 10 sec x 10 Tactile cues required   Increase in radicular symptoms   Hip flexor stretch     B Piriformis stretch 2 x 30\" B    Seated HS Stretch          HEP     SKTC, Piriformis, and HS Stretch  10/29/20     PPT                           Other Therapeutic Activities:      Home Exercise Program:   11/3/20: Reviewed HEP    Manual Treatments:    STM to lumbar paraspinals, lumbar PA and rotation mobilizations, Hip Distraction mobilization    Modalities:   Estim: IFC with MHP x 15 min to lumbar. Supine  Add US if pt still having high amounts of pain       Progression Towards Functional goals:  [] Patient is progressing as expected towards functional goals listed. [] Progression is slowed due to complexities listed. [] Progression has been slowed due to co-morbidities. [x] Plan just implemented, too soon to assess goals progression  [] Other:    Charges: Therapeutic Exercise:  [x] (46498) Provided verbal/tactile cueing for activities to restore or maintain strength, flexibility, endurance, ROM for improvements with self-care, mobility, lifting and ambulation. Neuromuscular Re-Education  [] (12446) Provided verbal/tactile cueing for activities to restore or maintain balance, coordination, kinesthetic sense, posture, motor skill, proprioception for self-care, mobility, lifting, and ambulation.      Therapeutic Activities:    [x] (40093) Provided verbal/tactile cueing to address functional limitations related to loss of mobility, strength, balance, and coordination. Gait Training:  [] (47255) Provided training and instruction to the patient for proper postural muscle recruitment and positioning with ambulation re-education     Home Exercise Program:    [x] (46295) Reviewed/Progressed HEP activities related to strengthening, flexibility, endurance, ROM for functional self-care, mobility, lifting and ambulation   [] (70037) Reviewed/Progressed HEP activities related to improving balance, coordination, kinesthetic sense, posture, motor skill, proprioception for self-care, mobility, lifting, and ambulation      Manual Treatments:  MFR / STM / Oscillations-Mobs:  G-I, II, III, IV / Manipulation / MLD  [x] (23213) Provided manual therapy to mobilize  soft tissue/joints/fluid for the purpose of modulating pain, promoting relaxation, increasing ROM, reducing/eliminating soft tissue swelling/inflammation/restriction, improving soft tissue extensibility and allowing for proper ROM for normal function with self- care, mobility, lifting and ambulation.         Timed Code Treatment Minutes: 43   Total Treatment Minutes: 60     [] EVAL (LOW) 88963   [] EVAL (MOD) 92253   [] EVAL (HIGH) 18758   [] RE-EVAL   [x] TE (46374) x     [] Aquatic (47327) x  [] NMR (34953)   x  [] Aquatic Group (08513) x  [x] Manual (67355) x 2   [] Ultrasound (21766) x  [] TA (42585) x  [] Mech Traction (49981)  [] Ionto (86047)           [x] ES (un) (28359):   [] Vasopump (72908) [] Other:      Assessment  [x] Patient tolerated treatment well [] Patient limited by fatigue  [] Patient limited by pain  [] Patient limited by other medical complications  [] Other:     Prognosis: [x] Good [] Fair  [] Poor    Goals:    Short term goals  Time Frame for Short term goals: 3 Weeks  Short term goal 1: Pt will show independence in HEP  Short term goal 2: Pt will report no radicular symptoms in left LE      Long term goals  Time Frame for Long term goals : 6 Weeks  Long term goal 1: Pt will report no radicular symptoms and <4/10 pain consistently so pt can return to walking 1 mile and completing exercise program  Long term goal 2: Pt will show pain free left lumbar sidebend AROM to make ADLs easier  Long term goal 3: Pt will show normal left HS muscle length and pain free SLR on left  Long term goal 4: Pt will show 4+/5 left hip and knee strength     Patient Requires Follow-up: [x] Yes  [] No    Plan:   [x] Continue per plan of care [] Alter current plan (see comments)  [] Plan of care initiated [] Hold pending MD visit [] Discharge  Note: If patient does not return for scheduled/ recommended follow up visits, this note will serve as a discharge from care along with most recent update on progress.     Plan for Next Session:   Manual Above  Core strengthening  Modalities: MHP, US, EStim as needed     Electronically signed by:  Ariel Pratt PTA

## 2020-12-15 ENCOUNTER — HOSPITAL ENCOUNTER (OUTPATIENT)
Dept: PHYSICAL THERAPY | Age: 60
Setting detail: THERAPIES SERIES
Discharge: HOME | End: 2020-12-15
Payer: COMMERCIAL

## 2020-12-17 ENCOUNTER — APPOINTMENT (OUTPATIENT)
Dept: PHYSICAL THERAPY | Age: 60
End: 2020-12-17
Payer: COMMERCIAL

## 2020-12-17 ENCOUNTER — OFFICE VISIT (OUTPATIENT)
Dept: ORTHOPEDIC SURGERY | Age: 60
End: 2020-12-17
Payer: COMMERCIAL

## 2020-12-17 VITALS — WEIGHT: 157 LBS | HEIGHT: 67 IN | TEMPERATURE: 97.2 F | BODY MASS INDEX: 24.64 KG/M2

## 2020-12-17 PROBLEM — M51.26 HNP (HERNIATED NUCLEUS PULPOSUS), LUMBAR: Status: ACTIVE | Noted: 2020-12-17

## 2020-12-17 PROCEDURE — 99214 OFFICE O/P EST MOD 30 MIN: CPT | Performed by: PHYSICIAN ASSISTANT

## 2020-12-18 ENCOUNTER — TELEPHONE (OUTPATIENT)
Dept: PRIMARY CARE CLINIC | Age: 60
End: 2020-12-18

## 2020-12-18 NOTE — TELEPHONE ENCOUNTER
----- Message from Israel Fink sent at 12/16/2020  5:03 PM EST -----  Subject: Message to Provider    QUESTIONS  Information for Provider? patient is calling for Dr. Helen Dunham . patient   needs documentation stating the date she couldn't work. patient is having   issues with her rent and she needs this documentation asap. at least by   tomorrow. ---------------------------------------------------------------------------  --------------  Wendy AGUIRRE  What is the best way for the office to contact you? OK to leave message on   voicemail  Preferred Call Back Phone Number? [phone]  ---------------------------------------------------------------------------  --------------  SCRIPT ANSWERS  Relationship to Patient?  Self

## 2020-12-18 NOTE — PROGRESS NOTES
Subjective:      Patient ID: Xochilt Bañuelos is a 64 y.o. female. Chief Complaint   Patient presents with    Lower Back Pain     low back pain        HPI:   She is here for follow up on her low back pain and lumbar radicular pain. Leg pain and back pain continues to be 7/10. She has been through physical therapy and medications without improvement. Review Of Systems:   Negative for fever or chills. Negative for bladder or bowel changes. Past Medical History:   Diagnosis Date    Depression 2011    Headache(784.0) 6-7 months    pain in forehead    HTN (hypertension) 10/10/2011    Musculoskeletal back pain 1/10/2018    Primary osteoarthritis of right knee 1/10/2018    Sciatica of left side 2020       Family History   Problem Relation Age of Onset    Cancer Father     Diabetes Mother     High Blood Pressure Mother     Stroke Mother        Past Surgical History:   Procedure Laterality Date     SECTION  ,     COLONOSCOPY      problems with duodenum    HERNIA REPAIR      TUBAL LIGATION      VENTRAL HERNIA REPAIR  2012    VENTRAL HERNIA REPAIR WITH MESH              Social History     Occupational History    Not on file   Tobacco Use    Smoking status: Former Smoker     Packs/day: 0.25     Years: 37.00     Pack years: 9.25     Types: Cigarettes     Quit date: 2012     Years since quittin.8    Smokeless tobacco: Never Used    Tobacco comment: smoke when nerves get bad   Substance and Sexual Activity    Alcohol use:  Yes     Alcohol/week: 0.0 standard drinks     Comment: 2x a week    Drug use: No    Sexual activity: Yes     Partners: Male       Current Outpatient Medications   Medication Sig Dispense Refill    FLUoxetine (PROZAC) 20 MG capsule Take 1 capsule by mouth daily 30 capsule 3    olmesartan-hydrochlorothiazide (BENICAR HCT) 40-12.5 MG per tablet Take 1 tablet by mouth daily 90 tablet 1    gabapentin (NEURONTIN) 300 MG capsule Take 1 capsule by mouth 3 times daily. For back pain, take all the time. 90 capsule 5    dexlansoprazole (DEXILANT) 60 MG CPDR delayed release capsule Take 1 capsule by mouth daily Discontinue omeprazole 30 capsule 5    Cholecalciferol (VITAMIN D3) 125 MCG (5000 UT) TBDP Take 1 each by mouth daily 30 tablet 5    Blood Pressure Monitoring (BLOOD PRESSURE CUFF) MISC by Does not apply route. 1 each 0     Current Facility-Administered Medications   Medication Dose Route Frequency Provider Last Rate Last Admin    methylPREDNISolone acetate (DEPO-MEDROL) injection 80 mg  80 mg Intramuscular Once Parish Quintero MD             Objective:     She is alert, oriented x 3, pleasant, well nourished, developed and in no   acute distress. Temp 97.2 °F (36.2 °C)   Ht 5' 7\" (1.702 m)   Wt 157 lb (71.2 kg)   BMI 24.59 kg/m²      LUMBAR SPINE EXAM:  Examination of the Lumbar spine shows:  Deformity Absent . Soft Tissue Swelling Absent . Soft Tissue Tenderness Present. Midline Bone Tenderness Absent . Paraspinal Muscular Spasm Absent . Previous Incisions Absent . Erythema Absent . Lumbar Flexion does produce pain. Lumbar Extension does produce pain. NEUROLOGICAL EXAM:  SLR     Left: Negative. Right Negative. DTR 2+ bilaterally at the patella and Achilles. Motor Strength Exam:  5/5 in all major motor groups of the lower extremities. Bazan's Sign Absent   Gait normal Heel/ Toe. Sensation to Touch normal    VASCULAR EXAM:  Examination of the upper and lower extremities shows intact perfusion to all extremities, no cyanosis, digits are warm to touch, capillary refill is less than 2 seconds. No significant edema noted. SKIN:  Examination of the skin reveals the skin to be intact without lacerations, abrasions, significant erythema, rashes or skin lesions.            X Rays: not performed in the office today:   Previous x-rays demonstrated degenerative disc disease most prominent at L4-5 with facet arthropathy. MRI February 2018 showed a left paracentral disc herniation L4-5 with degenerative disc L3 to the sacrum. Diagnosis:       ICD-10-CM    1. DDD (degenerative disc disease), lumbar  M51.36    2. HNP (herniated nucleus pulposus), lumbar  M51.26         Assessment and Plan:     Assessment:  Persistent low back pain and radicular pain, degenerative disc disease, probable stenosis. She has failed conservative treatment including therapy, medications, rest, activity modification. 25 minutes was the total time spent on today's visit reviewing test results or histories in preparation for the visit, time spent on documentation, ordering prescriptions, tests, procedures after the visit. Plan:  1. Medications-no changes today. 2. PT-completed multiple therapy sessions without improvement. 3. Further Imaging-MRI lumbar spine to evaluate for suspected stenosis. 4. Procedures-none. 5. Follow up-after MRI to review test results. 6. Call or return to clinic if these symptoms worsen or fail to improve as anticipated.

## 2020-12-21 ENCOUNTER — HOSPITAL ENCOUNTER (OUTPATIENT)
Dept: MRI IMAGING | Age: 60
Discharge: HOME | End: 2020-12-21
Payer: COMMERCIAL

## 2020-12-21 PROCEDURE — 72148 MRI LUMBAR SPINE W/O DYE: CPT

## 2021-01-05 ENCOUNTER — HOSPITAL ENCOUNTER (OUTPATIENT)
Dept: PHYSICAL THERAPY | Age: 61
Setting detail: THERAPIES SERIES
Discharge: HOME | End: 2021-01-05
Payer: COMMERCIAL

## 2021-01-05 PROCEDURE — 97140 MANUAL THERAPY 1/> REGIONS: CPT

## 2021-01-05 PROCEDURE — 97110 THERAPEUTIC EXERCISES: CPT

## 2021-01-05 NOTE — FLOWSHEET NOTE
[address] [facility] [address] [place] [address]  Phone: [phone]   Fax:     [phone]     Physical Therapy Discharge Summary    Dear Isaías Gonzalez,    We had the pleasure of treating the following patient for physical therapy services at [address] [place] [address]. A summary of our findings can be found in the discharge summary below. If you have any questions or concerns regarding these findings, please do not hesitate to contact me at the office phone number above. Thank you for the referral.     Physician Signature:________________________________Date:__________________  By signing above (or electronic signature), therapists plan is approved by physician      Overall Response to Treatment:   []Patient is responding well to treatment and improvement is noted with regards  to goals   []Patient should continue to improve in reasonable time if they continue HEP   [x]Patient has plateaued and is no longer responding to skilled PT intervention    []Patient is getting worse and would benefit from return to referring MD   []Patient unable to adhere to initial POC   []Other:     Date range of Visits: 10/22/20 to 21  Total Visits: 12  Physical Therapy Daily Treatment Note  Date:  2021    Patient Name:  Ken Moss    :  1960  MRN: [id number]    Restrictions/Precautions:     Pertinent Medical History: Sciatic L side, HTN, OA of R knee, Hernia Repair   Medical/Treatment Diagnosis Information:  · Diagnosis: DDD- Lumbar, Spondylolisthesis of lumbar  · Treatment Diagnosis: Pain with neural tension.  Decreased lumbar left SB AROM and left hip/knee strength  Insurance/Certification information:  PT Insurance Information: [employer] [address] [employer]  Physician Information:  Referring Practitioner: Isaías Gonzalez PA-C  Plan of care signed (Y/N):    Visit# / total visits:    Pain level: 6-7/10     Functional Outcomes Measure:  Test: Oswestry  Score:43 (CJ)    Progress Note: []  Yes  []  No  Next due by: Visit #10      History of Injury:   Pt states she has had back pain for a couple years from no known onset. Pain is 8/10. Pain is primarily in low back and has become worse in the past 3 months. Pt also gets some pain down into left upper leg with numbness but that occurs every couple days. Standing makes pain travel down to her leg if she does it for any period of time. Pt has noticed problems with her balance too because of it. Doesn't bother her driving. States she had epidural injections and it helped some. She takes gabapentin 3x per day. States she also has left knee pain that comes with the back pain but sometimes left knee just hurts. Goal is to decrease pain so she doesn't have to use medications or shots. Pt states she hopes to get back to workout program and walking a mile or two    Subjective:     10/29/20: Patient reports her back feels looser her numbness down the left leg still about the same. 11/3/20: Pt states she is doing a little better today  11/05/20: Patient reports her back and leg feels about the same. 11/10/20: States back is hurting today. 11/12/20: Pt states she is doing better today, pain was down a little yesterday. Estim helped  11/17/20: States she is doing a little better. 11/19/20: Pt states she is not hurting much today. Has some pain when she wakes up.   12/3/20: States 4 days ago she started having some more back pain  12/08/20: Patient reports her back has  a little more sore,due to increased activity over the weekend. 12/10/20: Patient reports back is still waking her up at night. States pain is mainly across the low back more on the left. 1/5/20: Pt states back is about the same still hurting, may need surgery.  Also gets numbness and tingling down her left leg at times, every few hours    Objective:   Observation:    Test measurements:      Exercises:  Exercise/Equipment Resistance/Repetitions Other comments   HSS 3 x 30 sec  B    PPT 10 sec x 10 Tactile cues required   Increase in radicular symptoms   Hip flexor stretch     B Piriformis stretch 2 x 30\" B    Seated HS Stretch          HEP     SKTC, Piriformis, and HS Stretch  10/29/20     PPT                           Other Therapeutic Activities:      Home Exercise Program:   11/3/20: Reviewed HEP    Manual Treatments:    STM to lumbar paraspinals, lumbar PA and rotation mobilizations, Hip Distraction mobilization    Modalities:    MHP x 15 min to lumbar. Supine         Progression Towards Functional goals:  [] Patient is progressing as expected towards functional goals listed. [] Progression is slowed due to complexities listed. [] Progression has been slowed due to co-morbidities. [x] Plan just implemented, too soon to assess goals progression  [] Other:    Charges: Therapeutic Exercise:  [x] (30623) Provided verbal/tactile cueing for activities to restore or maintain strength, flexibility, endurance, ROM for improvements with self-care, mobility, lifting and ambulation. Neuromuscular Re-Education  [] (29424) Provided verbal/tactile cueing for activities to restore or maintain balance, coordination, kinesthetic sense, posture, motor skill, proprioception for self-care, mobility, lifting, and ambulation. Therapeutic Activities:    [x] (75567) Provided verbal/tactile cueing to address functional limitations related to loss of mobility, strength, balance, and coordination.      Gait Training:  [] (38738) Provided training and instruction to the patient for proper postural muscle recruitment and positioning with ambulation re-education     Home Exercise Program:    [x] (62518) Reviewed/Progressed HEP activities related to strengthening, flexibility, endurance, ROM for functional self-care, mobility, lifting and ambulation   [] (61558) Reviewed/Progressed HEP activities related to improving balance, coordination, kinesthetic sense, posture, motor skill, proprioception for self-care, mobility, lifting, and ambulation      Manual Treatments:  MFR / STM / Oscillations-Mobs:  G-I, II, III, IV / Manipulation / MLD  [x] (21641) Provided manual therapy to mobilize  soft tissue/joints/fluid for the purpose of modulating pain, promoting relaxation, increasing ROM, reducing/eliminating soft tissue swelling/inflammation/restriction, improving soft tissue extensibility and allowing for proper ROM for normal function with self- care, mobility, lifting and ambulation.         Timed Code Treatment Minutes: 46   Total Treatment Minutes: 61     [] EVAL (LOW) 21528   [] EVAL (MOD) 95646   [] EVAL (HIGH) 96093   [] RE-EVAL   [x] TE (91119) x     [] Aquatic (29060) x  [] NMR (36753)   x  [] Aquatic Group (32455) x  [x] Manual (09148) x 2   [] Ultrasound (04961) x  [] TA (11119) x  [] Mech Traction (46343)  [] Ionto (12545)           [x] ES (un) (10704):   [] Vasopump (72208) [] Other:      Assessment  [x] Patient tolerated treatment well [] Patient limited by fatigue  [] Patient limited by pain  [] Patient limited by other medical complications  [] Other:     Prognosis: [x] Good [] Fair  [] Poor    Goals:    Short term goals  Time Frame for Short term goals: 3 Weeks  Short term goal 1: Pt will show independence in HEP- MET  Short term goal 2: Pt will report no radicular symptoms in left LE- MET mostly      Long term goals  Time Frame for Long term goals : 6 Weeks  Long term goal 1: Pt will report no radicular symptoms and <4/10 pain consistently so pt can return to walking 1 mile and completing exercise program- NOT MET  Long term goal 2: Pt will show pain free left lumbar sidebend AROM to make ADLs easier- NOT MET  Long term goal 3: Pt will show normal left HS muscle length and pain free SLR on left- NOT MET but improved  Long term goal 4: Pt will show 4+/5 left hip and knee strength - NOT MET    Patient Requires Follow-up: [x] Yes  [] No    Plan:   [] Continue per plan of care [] Alter current plan (see comments)  [] Plan of care initiated [] Hold pending MD visit [x] Discharge  Note: If patient does not return for scheduled/ recommended follow up visits, this note will serve as a discharge from care along with most recent update on progress.     Plan for Next Session:   Manual Above  Core strengthening  Modalities: MHP, US, EStim as needed     Electronically signed by:  Samuel Santos PT

## 2021-01-08 ENCOUNTER — TELEPHONE (OUTPATIENT)
Dept: ORTHOPEDIC SURGERY | Age: 61
End: 2021-01-08

## 2021-01-11 ENCOUNTER — TELEPHONE (OUTPATIENT)
Dept: ORTHOPEDIC SURGERY | Age: 61
End: 2021-01-11

## 2021-01-11 DIAGNOSIS — M43.16 SPONDYLOLISTHESIS OF LUMBAR REGION: Primary | ICD-10-CM

## 2021-01-11 DIAGNOSIS — M51.36 DDD (DEGENERATIVE DISC DISEASE), LUMBAR: ICD-10-CM

## 2021-01-11 NOTE — TELEPHONE ENCOUNTER
General Question     Subject: Patient wants to know if Gabby Hays can call her with her test results. States her car is acting up.    Patient and /or Facility Request: Viki Metz Number: [phone]

## 2021-01-12 ENCOUNTER — TELEPHONE (OUTPATIENT)
Dept: ORTHOPEDIC SURGERY | Age: 61
End: 2021-01-12

## 2021-01-12 NOTE — TELEPHONE ENCOUNTER
PT WOULD LIKE LIZ MILLER TO CALL HER SISTER CHANELLE TO DISCUSS HER CONDITION WITH HER. SISTER IS ON HIPPA FORM. PLEASE CALL CHANELLE [phone].

## 2021-01-14 ENCOUNTER — HOSPITAL ENCOUNTER (OUTPATIENT)
Dept: WOMENS IMAGING | Age: 61
Discharge: HOME | End: 2021-01-14
Payer: COMMERCIAL

## 2021-01-14 DIAGNOSIS — Z12.31 ENCOUNTER FOR SCREENING MAMMOGRAM FOR BREAST CANCER: ICD-10-CM

## 2021-01-14 PROCEDURE — 77063 BREAST TOMOSYNTHESIS BI: CPT

## 2021-01-16 DIAGNOSIS — R92.8 ABNORMAL MAMMOGRAM OF RIGHT BREAST: Primary | ICD-10-CM

## 2021-01-21 ENCOUNTER — APPOINTMENT (OUTPATIENT)
Dept: PHYSICAL THERAPY | Age: 61
End: 2021-01-21
Payer: COMMERCIAL

## 2021-01-22 ENCOUNTER — APPOINTMENT (OUTPATIENT)
Dept: PHYSICAL THERAPY | Age: 61
End: 2021-01-22
Payer: COMMERCIAL

## 2021-01-27 ENCOUNTER — TELEPHONE (OUTPATIENT)
Dept: ORTHOPEDIC SURGERY | Age: 61
End: 2021-01-27

## 2021-01-27 ENCOUNTER — HOSPITAL ENCOUNTER (OUTPATIENT)
Dept: ULTRASOUND IMAGING | Age: 61
Discharge: HOME | End: 2021-01-27
Payer: COMMERCIAL

## 2021-01-27 ENCOUNTER — HOSPITAL ENCOUNTER (OUTPATIENT)
Dept: WOMENS IMAGING | Age: 61
Discharge: HOME | End: 2021-01-27
Payer: COMMERCIAL

## 2021-01-27 DIAGNOSIS — R92.8 ABNORMAL MAMMOGRAM OF RIGHT BREAST: ICD-10-CM

## 2021-01-27 DIAGNOSIS — R92.8 ABNORMAL MAMMOGRAM: ICD-10-CM

## 2021-01-27 PROCEDURE — G0279 TOMOSYNTHESIS, MAMMO: HCPCS

## 2021-01-27 PROCEDURE — 76642 ULTRASOUND BREAST LIMITED: CPT

## 2021-01-27 NOTE — TELEPHONE ENCOUNTER
Called and spoke with her sister. Advised that she needs to see Dr. Sammy Salguero for evaluation to discuss WESLEY.

## 2021-01-27 NOTE — TELEPHONE ENCOUNTER
General Question     Subject: EPIDURAL INJ    Patient and /or Facility Request: Checking the date and time of her schd inj.    Contact Number: [phone] or call her sister Carol Viera [phone]

## 2021-02-02 ENCOUNTER — TELEPHONE (OUTPATIENT)
Dept: ORTHOPEDIC SURGERY | Age: 61
End: 2021-02-02

## 2021-02-23 ENCOUNTER — OFFICE VISIT (OUTPATIENT)
Dept: PRIMARY CARE CLINIC | Age: 61
End: 2021-02-23
Payer: COMMERCIAL

## 2021-02-23 VITALS
TEMPERATURE: 97.3 F | DIASTOLIC BLOOD PRESSURE: 74 MMHG | SYSTOLIC BLOOD PRESSURE: 127 MMHG | HEART RATE: 86 BPM | WEIGHT: 161.8 LBS | BODY MASS INDEX: 25.34 KG/M2 | OXYGEN SATURATION: 97 %

## 2021-02-23 DIAGNOSIS — M17.11 PRIMARY OSTEOARTHRITIS OF RIGHT KNEE: ICD-10-CM

## 2021-02-23 DIAGNOSIS — I10 ESSENTIAL HYPERTENSION: ICD-10-CM

## 2021-02-23 DIAGNOSIS — M54.32 SCIATICA OF LEFT SIDE: ICD-10-CM

## 2021-02-23 DIAGNOSIS — Z12.4 CERVICAL CANCER SCREENING: Primary | ICD-10-CM

## 2021-02-23 DIAGNOSIS — E55.9 VITAMIN D DEFICIENCY: ICD-10-CM

## 2021-02-23 DIAGNOSIS — M51.36 DDD (DEGENERATIVE DISC DISEASE), LUMBAR: ICD-10-CM

## 2021-02-23 PROCEDURE — 99212 OFFICE O/P EST SF 10 MIN: CPT | Performed by: NURSE PRACTITIONER

## 2021-02-23 SDOH — ECONOMIC STABILITY: TRANSPORTATION INSECURITY
IN THE PAST 12 MONTHS, HAS LACK OF TRANSPORTATION KEPT YOU FROM MEETINGS, WORK, OR FROM GETTING THINGS NEEDED FOR DAILY LIVING?: NOT ASKED

## 2021-02-23 ASSESSMENT — PATIENT HEALTH QUESTIONNAIRE - PHQ9
SUM OF ALL RESPONSES TO PHQ QUESTIONS 1-9: 1
SUM OF ALL RESPONSES TO PHQ9 QUESTIONS 1 & 2: 1
SUM OF ALL RESPONSES TO PHQ QUESTIONS 1-9: 1
1. LITTLE INTEREST OR PLEASURE IN DOING THINGS: 0
2. FEELING DOWN, DEPRESSED OR HOPELESS: 1
SUM OF ALL RESPONSES TO PHQ QUESTIONS 1-9: 1

## 2021-02-23 NOTE — PATIENT INSTRUCTIONS
Handicap placard, unable to walk long distance due to osteoarthritis of knee and back pain.      [facility]  Dr. Lawyer Heredia PA  [address], [address] [place] [address]  Phone: [phone]  Follow up for physical therapy    Referral for Dr. Melanie Fernandez for colonoscopy

## 2021-02-23 NOTE — LETTER
[facility]  [address] [address]  Phone: [phone]  Fax: [address], APRN - CNP        February 23, 2021     Patient: Thalia Small   YOB: 1960   Date of Visit: 2/23/2021       To Whom It May Concern: It is my medical opinion that Deangelo Helton requires a disability parking placard for the following reasons:  She cannot walk 200 feet without stopping to rest.  Duration of need: 1 year    If you have any questions or concerns, please don't hesitate to call.     Sincerely,        VERONICA Hahn CNP

## 2021-02-23 NOTE — PROGRESS NOTES
[address]  [address] [facility] [place] [address]  Dept: [phone]       2021    Kwabena Hunter (:  1960) humphrey 64 y.o. female, here to establish care. She has a history of back pain, history, and depression. HPI     Back Pain   This is a chronic problem. The current episode started more than 1 year ago. The problem occurs constantly. The problem is unchanged. The pain is present in the lumbar spine. The quality of the pain is described as aching and burning. The pain is the same all the time. The symptoms are aggravated by bending, standing and twisting. Associated symptoms include leg pain, numbness, tingling and weakness. Pertinent negatives include no abdominal pain,  bladder incontinence, bowel incontinence, chest pain, dysuria, fever, headaches or pelvic pain. Treatments tried: gabapentin and she ambulates with 4 prong cane. Hypertension  This is a chronic problem. The problem has been gradually improving since onset. The problem is uncontrolled. Associated symptoms include headaches. Pertinent negatives include no anxiety, blurred vision, chest pain, malaise/fatigue, neck pain, orthopnea, palpitations, peripheral edema, PND, shortness of breath or sweats. There are no associated agents to hypertension. There are no known risk factors for coronary artery disease. The current treatment provides moderate improvement. There are no compliance problems. There is no history of angina, kidney disease, CAD/MI, CVA, heart failure or PVD. Reports a history of depression, stopped taking Prescribed Prozac 3 months ago. States medication causes dizziness. She declines refill on medication or starting any other medication at this time. She denies homicidal/suicidal ideations.      Health Care Maintenance:  Td Vaccine: UTD per patient report  Influenza Vaccine: UTD per patient report  Shingles Vaccine: obtain at pharmacy  Cervical Cancer Screen: past due  Colon Cancer screen: Referral previously placed  Lab Results   Component Value Date    CHOL 206 (H) 11/29/2012    CHOL 186 12/16/2011     Lab Results   Component Value Date    TRIG 86 12/16/2011     Lab Results   Component Value Date    HDL 72 (H) 12/16/2011     Lab Results   Component Value Date    LDLCALC 97 12/16/2011     Lab Results   Component Value Date    LABVLDL 17 12/16/2011     Lab Results   Component Value Date    WBC 6.3 04/01/2020    HGB 14.1 04/01/2020    HCT 42.8 04/01/2020    MCV 86.1 04/01/2020     04/01/2020     Lab Results   Component Value Date     04/01/2020    K 4.1 04/01/2020     04/01/2020    CO2 28 04/01/2020    BUN 14 04/01/2020    CREATININE 0.8 04/01/2020    GLUCOSE 105 (H) 04/01/2020    CALCIUM 9.9 04/01/2020    PROT 8.4 (H) 04/01/2020    LABALBU 4.5 04/01/2020    BILITOT 0.5 04/01/2020    ALKPHOS 98 04/01/2020    AST 14 (L) 04/01/2020    ALT 14 04/01/2020    LABGLOM >60 04/01/2020    GFRAA >60 04/01/2020    AGRATIO 1.2 04/01/2020    GLOB 3.9 04/01/2020       Review of Systems   Constitutional: Negative for activity change and fever. HENT: Negative for congestion. Eyes: Negative for visual disturbance. Respiratory: Negative for chest tightness and shortness of breath. Cardiovascular: Negative for chest pain, palpitations and leg swelling. History of hypertension, managed on Olmesartan/HCTZ.  1/2021 Mammogram revealed 5mm mass right breast, recommend follow up in 6 months. Gastrointestinal: Negative for abdominal pain, constipation and diarrhea. GERD managed with Dexilant  Ventral hernia repair 12/2012   Endocrine: Negative for polyuria. Genitourinary: Negative for dysuria. Musculoskeletal: Negative for arthralgias and myalgias. History of osteoarthritis of right knee  12/2020 MRI:   1. Moderate to severe spinal canal stenosis at L4-L5  2. Multilevel neural foraminal narrowing as above.   3. Loss of disc space height with endplate irregularity  Modic type 1 degenerative endplate changes at B6-L1.     Trochanteric bursitis of the left and right hip and mild degenerative arthritis of left and right knee. Skin: Negative for rash. Allergic/Immunologic:        History of allergic rhinitis   Neurological: Negative for dizziness, light-headedness and headaches. History of headaches   Psychiatric/Behavioral: Negative for agitation, decreased concentration and sleep disturbance. The patient is not nervous/anxious. History of depression       Prior to Visit Medications    Medication Sig Taking? Authorizing Provider   Cholecalciferol (VITAMIN D3) 125 MCG (5000 UT) TBDP Take 1 each by mouth daily Yes Norah Davis APRN - CNP   olmesartan-hydrochlorothiazide (BENICAR HCT) 40-12.5 MG per tablet Take 1 tablet by mouth daily Yes Parker Childers MD   gabapentin (NEURONTIN) 300 MG capsule Take 1 capsule by mouth 3 times daily. For back pain, take all the time.  Yes Parker Childers MD   dexlansoprazole ([facility]) 60 MG CPDR delayed release capsule Take 1 capsule by mouth daily Discontinue omeprazole Yes Parker Childers MD       No Known Allergies    Past Medical History:   Diagnosis Date    Depression 12/17/2011    Headache(784.0) 6-7 months    pain in forehead    HTN (hypertension) 10/10/2011    Musculoskeletal back pain 1/10/2018    Primary osteoarthritis of right knee 1/10/2018    Sciatica of left side 8/27/2020       Past Surgical History:   Procedure Laterality Date   [address] , 1990    COLONOSCOPY  2003    problems with duodenum    HERNIA [facility]  12/03/2012    VENTRAL HERNIA REPAIR WITH MESH              Social History     Socioeconomic History    Marital status:      Spouse name: Not on file    Number of children: 2    Years of education: Not on file    Highest education level: Not on file   Occupational History  Not on file   Social Needs    Financial resource strain: Not on file    Food insecurity     Worry: Not on file     Inability: Not on file    Transportation needs     Medical: Not on file     Non-medical: Not on file   Tobacco Use    Smoking status: Former Smoker     Packs/day: 0.25     Years: 37.00     Pack years: 9.25     Types: Cigarettes     Quit date: 2012     Years since quittin.0    Smokeless tobacco: Never Used    Tobacco comment: smoke when nerves get bad   Substance and Sexual Activity    Alcohol use: Not Currently     Comment: 2x a week    Drug use: No    Sexual activity: Yes     Partners: Male   Lifestyle    Physical activity     Days per week: Not on file     Minutes per session: Not on file    Stress: Not on file   Relationships    Social connections     Talks on phone: Not on file     Gets together: Not on file     Attends Yarsani service: Not on file     Active member of club or organization: Not on file     Attends meetings of clubs or organizations: Not on file     Relationship status: Not on file    Intimate partner violence     Fear of current or ex partner: Not on file     Emotionally abused: Not on file     Physically abused: Not on file     Forced sexual activity: Not on file   Other Topics Concern    Not on file   Social History Narrative    Lives between sister and daughter. Her spouse stays with his sister because they are looking for an apartment. Family History   Problem Relation Age of Onset    Cancer Father     Diabetes Mother     High Blood Pressure Mother     Stroke Mother        Vitals:    21 1635   BP: 127/74   Pulse: 86   Temp: 97.3 °F (36.3 °C)   TempSrc: Temporal   SpO2: 97%   Weight: 161 lb 12.8 oz (73.4 kg)     Estimated body mass index is 25.34 kg/m² as calculated from the following:    Height as of 20: 5' 7\" (1.702 m). Weight as of this encounter: 161 lb 12.8 oz (73.4 kg). Physical Exam  Vitals signs reviewed. Constitutional:       Appearance: She is well-developed. HENT:      Head: Normocephalic. Right Ear: Hearing and external ear normal.      Left Ear: Hearing and external ear normal.      Nose: Nose normal.   Eyes:      General: Lids are normal.   Cardiovascular:      Rate and Rhythm: Normal rate and regular rhythm. Heart sounds: Normal heart sounds, S1 normal and S2 normal.   Pulmonary:      Effort: Pulmonary effort is normal.      Breath sounds: Normal breath sounds. Musculoskeletal: Normal range of motion. Skin:     General: Skin is warm and dry. Findings: No rash. Neurological:      Mental Status: She is alert and oriented to person, place, and time. GCS: GCS eye subscore is 4. GCS verbal subscore is 5. GCS motor subscore is 6. Psychiatric:         Speech: Speech normal.         Behavior: Behavior normal. Behavior is cooperative. ASSESSMENT/PLAN:  1. Vitamin D deficiency  -Continue current medications. - Cholecalciferol (VITAMIN D3) 125 MCG (5000 UT) TBDP; Take 1 each by mouth daily  Dispense: 30 tablet; Refill: 5    2. Cervical cancer screening    - LISA - Felisa Potts, CNP, Gynecology, [facility]    3. Essential hypertension  -Controlled  -Continue taking Olmesartan/ HCTZ 40-12.5 mg  -Maintain healthy life style changes    4. DDD (degenerative disc disease), lumbar  -Continue taking Gabapentin 300 mg TID  -Follow up with Orthopedic Spine  -Will provide handicap placcard    5. Primary osteoarthritis of right knee  --Continue taking Gabapentin 300 mg TID  -Follow up with Orthopedic Spine  -Will provide handicap placcard    6. Sciatica of left side  --Continue taking Gabapentin 300 mg TID  -Follow up with Orthopedic Spine  -Will provide handicap placcard        No follow-ups on file. Reviewed patient's pertinent medical history, relevant laboratory results, imaging studies, and health maintenance.  Medications have been reviewed and discussed with the patient, refills otherwise up-to-date. Discussed the importance of adhering to current medication regimen. Advised:  (1) continue to work on eating a healthy balanced diet; (2) stay active by exercising within your personal limits. Patient was advised to keep future appointments with their respective specialty care team(s). Questions and concerns addressed, care plan reviewed and patient is agreeable with the care plan following today's visit.       --VERONICA Koo - CNP on 2/27/2021 at 9:31 AM

## 2021-02-27 ASSESSMENT — ENCOUNTER SYMPTOMS
SHORTNESS OF BREATH: 0
DIARRHEA: 0
CONSTIPATION: 0
CHEST TIGHTNESS: 0
ALLERGIC/IMMUNOLOGIC COMMENTS: HISTORY OF ALLERGIC RHINITIS
ABDOMINAL PAIN: 0

## 2021-03-15 NOTE — PROGRESS NOTES
PATIENT REACHED   YES__X__NO____    PREOP INSTRUCTIONS LEFT ON VM NUMBER_______________      DATE_3/25/21_______ TIME__1515_______ARRIVAL__1415______PLACE__masc__________  NOTHING TO EAT OR DRINK  AFTER MIDNIGHT THE EVENING PRIOR OR AS INSTRUCTED BY YOUR DR.  Kimo Pinedaa NEED A RESPONSIBLE ADULT AGE 18 OR OLDER TO DRIVE YOU HOME  PLEASE BRING INSURANCE CARD. PICTURE ID AND COMPLETE LIST OF MEDS  WEAR LOOSE COMFORTABLE CLOTHING  FOLLOW ANY INSTRUCTIONS YOUR DRS OFFICE HAS GIVEN YOU,INCLUDING WHAT MEDICATIONS TO TAKE THE AM OF PROCEDURE AND WHEN AND IF YOU NEED TO STOP ANY BLOOD THINNERS. IF YOU HAVE QUESTIONS REGARDING THIS CALL THE OFFICE  THE GOAL BLOOD SUGAR THE AM OF PROCEDURE  OR LESS ABOVE THAT THE PROCEDURE MAY BE CANCELLED  ANY QUESTIONS CALL YOUR DOCTOR. ALSO,PLEASE READ THE INSTRUCTION PACKET FROM YOUR DR IF YOU RECEIVED ONE. SPINE INTERVENTION NUMBER [phone]      OTHER___________________________________      VISITOR POLICY(subject to change)      There is a one visitor policy at [facility] for all surgeries and endoscopies. Whether the visitor can stay or will be asked to wait in the car will depend on the current policy and if social distancing can be maintained. The policy is subject to change at any time. Please make sure the visitor has a cell phone that is on,charged and able to accept calls, as this may be the way that the staff communicates with them. Pain management is NO VISITOR policyThe patients ride is expected to remain in the car with a cell phone for communication. If the ride is leaving the hospital grounds please make sure they are back in time for pickup. Have the patient inform the staff on arrival what their rides plans are while the patient is in the facility. At the MAIN there is one visitor allowed. Please note that the visitor policy is subject to change.

## 2021-03-16 ENCOUNTER — TELEPHONE (OUTPATIENT)
Dept: PRIMARY CARE CLINIC | Age: 61
End: 2021-03-16

## 2021-03-25 ENCOUNTER — APPOINTMENT (OUTPATIENT)
Dept: GENERAL RADIOLOGY | Age: 61
End: 2021-03-25
Attending: PHYSICAL MEDICINE & REHABILITATION
Payer: COMMERCIAL

## 2021-03-25 ENCOUNTER — HOSPITAL ENCOUNTER (OUTPATIENT)
Age: 61
Setting detail: OUTPATIENT SURGERY
Discharge: HOME | End: 2021-03-25
Attending: PHYSICAL MEDICINE & REHABILITATION | Admitting: PHYSICAL MEDICINE & REHABILITATION
Payer: COMMERCIAL

## 2021-03-25 VITALS
WEIGHT: 165 LBS | SYSTOLIC BLOOD PRESSURE: 119 MMHG | BODY MASS INDEX: 26.52 KG/M2 | HEIGHT: 66 IN | RESPIRATION RATE: 16 BRPM | DIASTOLIC BLOOD PRESSURE: 87 MMHG | TEMPERATURE: 98 F | HEART RATE: 68 BPM | OXYGEN SATURATION: 98 %

## 2021-03-25 PROCEDURE — 2500000003 HC RX 250 WO HCPCS: Performed by: PHYSICAL MEDICINE & REHABILITATION

## 2021-03-25 PROCEDURE — 3610000056 HC PAIN LEVEL 4 BASE (NON-OR): Performed by: PHYSICAL MEDICINE & REHABILITATION

## 2021-03-25 PROCEDURE — 6360000002 HC RX W HCPCS: Performed by: PHYSICAL MEDICINE & REHABILITATION

## 2021-03-25 PROCEDURE — 2709999900 HC NON-CHARGEABLE SUPPLY: Performed by: PHYSICAL MEDICINE & REHABILITATION

## 2021-03-25 PROCEDURE — 77003 FLUOROGUIDE FOR SPINE INJECT: CPT

## 2021-03-25 RX ORDER — LIDOCAINE HYDROCHLORIDE 10 MG/ML
INJECTION, SOLUTION EPIDURAL; INFILTRATION; INTRACAUDAL; PERINEURAL
Status: COMPLETED | OUTPATIENT
Start: 2021-03-25 | End: 2021-03-25

## 2021-03-25 RX ORDER — DEXAMETHASONE SODIUM PHOSPHATE 10 MG/ML
INJECTION, SOLUTION INTRAMUSCULAR; INTRAVENOUS
Status: COMPLETED | OUTPATIENT
Start: 2021-03-25 | End: 2021-03-25

## 2021-03-25 NOTE — OP NOTE
PATIENT:  Aura Gravely  AGE:  85 yrs  MEDICAL RECORD #:  [id number]  YOB: 1960     DATE:  3/25/2021  PHYSICIAN: Jaiden Titus M.D. PROCEDURE: Left L3, L4 transforaminal epidural steroid injection under fluoroscopy. PRE-OP DIAGNOSIS:  Low Back Pain/Radiculopathy     POST-OP DIAGNOSIS:  same     HISTORY OF PRESENT ILLNESS:  See office notes. Patient has failed previous less-invasive treatments. Pre-op pain score: 8    Post-op pain score: 0     ALLERGIES:  Patient has no known allergies. MEDICATIONS:    No current facility-administered medications for this encounter. PHYSICAL EXAMINATION:              General:  Awake, alert              Heart:  No audible murmurs, extremities well perfused              Lungs:  No increased WOB or audible wheezing              Extremities:  Normal tone. Warm. No swelling. Anesthesia: 0 mg Versed and 0 mcg fentanyl    Estimated blood loss: None    DESCRIPTION OF PROCEDURE:     Components of the procedure were again reviewed with the patient prior to the procedure. She is aware of risks including infection, bleeding, allergic reaction, and nerve injury. She had ample opportunity for additional questions. She elected to proceed with treatment. The patient was placed in the prone position. Cardiovascular monitoring was initiated, and vital signs were stable prior to, during, and after the procedure. Utilizing fluoroscopy, the L3, L4 vertebrae were identified. The area was sterilely prepped and draped. Skin anesthesia was achieved at each entry site using 1-2 cc of Lidocaine 1%. A 22 g 3.50 inch spinal needle was slowly inserted into the left L3,4 neuroforamen using AP, lateral and oblique fluoroscopic imaging. Negative aspiration was confirmed. 1 cc Isovue-M 300 was injected at each site showing contrast spread into the epidural space and along the nerve root.   A combination of 1 cc Lidocaine 1% and 10 mg dexamethasone were slowly injected at each site. The needles were removed after the stylets were repositioned. A sterile bandage was applied. The patient was brought to recovery in stable condition. The patient tolerated the procedure well. DISPOSITION:  The patient was transported to recovery. The patient was monitored for 15 to 20 minutes post-procedure. Precautions were discussed and written instructions provided. Comment: Significant osteophyte at L4. Inferior approach successful.

## 2021-03-25 NOTE — H&P
HISTORY AND PHYSICAL/PRE-SEDATION ASSESSMENT    Patient:  Debbie Hooper   :  1960  Medical Record No.:  [id number]   Date:  3/25/2021  Physician:  Jayde Montaño MD  Facility: [address] [place] [address]    HISTORY OF PRESENT ILLNESS:                 The patient is a 63 y.o. female whom presents with leg pain. Review of the imaging and physical exam of the patient confirmed the pre-procedure diagnosis. After a thorough discussion of risks, benefits and alternatives informed consent was obtained. Diagnosis:  M54.16  LUMBAR RADICULOPATHY    Past Medical History:   Past Medical History:   Diagnosis Date    Depression 2011    Headache(784.0) 6-7 months    pain in forehead    HTN (hypertension) 10/10/2011    Musculoskeletal back pain 1/10/2018    Primary osteoarthritis of right knee 1/10/2018    Sciatica of left side 2020        Past Surgical History:     Past Surgical History:   Procedure Laterality Date   1990    COLONOSCOPY      problems with duodenum    HERNIA REPAIR      TUBAL LIGATION      VENTRAL HERNIA REPAIR  2012    VENTRAL HERNIA REPAIR WITH MESH              Current Medications:   Prior to Admission medications    Medication Sig Start Date End Date Taking? Authorizing Provider   Cholecalciferol (VITAMIN D3) 125 MCG (5000 UT) TBDP Take 1 each by mouth daily 21  Yes VERONICA Daniel - CNP   olmesartan-hydrochlorothiazide (BENICAR HCT) 40-12.5 MG per tablet Take 1 tablet by mouth daily 20  Yes Apollo Bowie MD   gabapentin (NEURONTIN) 300 MG capsule Take 1 capsule by mouth 3 times daily. For back pain, take all the time.  20 Yes Apollo Bowie MD   dexlansoprazole ([address] [place] [address]) 60 MG CPDR delayed release capsule Take 1 capsule by mouth daily Discontinue omeprazole  Patient taking differently: Take 60 mg by mouth daily NOT TAKING 3/15/21 7/20/20  Yes Aplolo Bowie MD Allergies:  Patient has no known allergies. Social History:    reports that she quit smoking about 9 years ago. Her smoking use included cigarettes. She has a 9.25 pack-year smoking history. She has never used smokeless tobacco. She reports previous alcohol use. She reports that she does not use drugs. Family History:   Family History   Problem Relation Age of Onset   [facility] Cancer Father     Diabetes Mother     High Blood Pressure Mother     Stroke Mother         Vitals: Blood pressure 129/79, pulse 98, temperature 98 °F (36.7 °C), temperature source Temporal, resp. rate 16, height 5' 6\" (1.676 m), weight 165 lb (74.8 kg), SpO2 100 %, not currently breastfeeding. PHYSICAL EXAM:  HENT: Airway patent and reviewed  Cardiovascular: Normal rate, regular rhythm, normal heart sounds. Pulmonary/Chest: No wheezes. No rhonchi. No rales. Abdominal: Soft. Bowel sounds are normal. No distension. Extremities: Moves all extremities equally  Lumbar Spine: Painful range of motion, no midline tenderness     ASA CLASS:         []   I. Normal, healthy adult           [x]   II.  Mild systemic disease            []   III. Severe systemic disease    Mallampati: Mallampati Class II - (soft palate, fauces & uvula are visible)      Sedation plan:   []  Local              [x]  Minimal                  []  General anesthesia    Treatment plan:  Patient's condition acceptable for planned procedure/sedation. Proceed with planned procedure   Post Procedure Plan   Return to same level of care   ______________________     The risks and benefits as well as alternatives to the procedure have been discussed with the patient and or family. The patient and/or next of kin understands and agrees to proceed.     Brittani Brown MD

## 2021-03-30 ENCOUNTER — HOSPITAL ENCOUNTER (OUTPATIENT)
Dept: PHYSICAL THERAPY | Age: 61
Setting detail: THERAPIES SERIES
Discharge: HOME | End: 2021-03-30
Payer: COMMERCIAL

## 2021-03-30 PROCEDURE — 97162 PT EVAL MOD COMPLEX 30 MIN: CPT

## 2021-03-30 PROCEDURE — 97110 THERAPEUTIC EXERCISES: CPT

## 2021-03-30 ASSESSMENT — PAIN SCALES - QUEBEC BACK PAIN DISABILITY SCALE
TAKE FOOD OUT OF THE REFRIGERATOR: 4
BEND OVER TO CLEAN THE BATHTUB: 5
REACH UP TO HIGH SHELVES: 3
TOTAL SCORE: 78
QUEBEC CMS MODIFIER: CL
RIDE IN A CAR: 3
SIT IN A CHAIR FOR SEVERAL HOURS: 4
GET OUT OF BED: 4
TURN OVER IN BED: 4
STAND UP FOR 20 TO 30 MINUTES: 3
CARRY TWO BAGS OF GROCERIES: 4
MOVE A CHAIR: 4
QUEBEC DISABILITY INDEX: 60-79%

## 2021-03-30 NOTE — FLOWSHEET NOTE
[facility].  [address]  Phone: [phone]   Fax:     [phone]    Physical Therapy Treatment Note/ Progress Report:     Date:  3/30/2021    Patient Name:  Minoo Bustillos    :  1960  MRN: [id number]    Pertinent Medical History:      Medical/Treatment Diagnosis Information:  · Diagnosis: DDD- Lumbar, Spondylolisthesis of lumbar M43.16, M51.36  · Treatment Diagnosis: Weak core with LS facet iritation and structural instability    Insurance/Certification information:  Nita Tapia 150 AIM  Physician Information:  Referring Practitioner: Nehemiah Siegel PA-C  Plan of care signed (Y/N): routed 3/30/21    Date of Patient follow up with Physician:      Progress Report: []  Yes  [x]  No     Date Range for reporting period:  Beginning: 3/30/2021  Ending:    Progress report due (10 Rx/or 30 days whichever is less):     Recertification due (POC duration/ or 90 days whichever is less):    Visit # POC/ Insurance Allowable Auth Needed   1 AIM [x]Yes    []No     Functional Scale:       Date Assessed: at eval  Test: Tadev[place] = 78  Score:     Pain level:  6-8/10     History of Injury:EDSI 3/25/21    SUBJECTIVE:  Patient stated complaint c/o back and RLE pain for 3-4 years     OBJECTIVE:    Observation:    Test measurements:      RESTRICTIONS/PRECAUTIONS:     Exercises/Interventions:     Therapeutic Ex (88429)   Min: Resistance/Reps Notes/Cues   HSS                                   Mat ex     PPT  March  Alt shld flex     PPT 5\" 15 x    Clams  15 x 2 L/R ea    SKTC 20\" x 3 L/R ea  Poor tolerance to RLE due to LBP   Piriformis strech     Bridge     Seated hip abd vs t band      Prone alt leg lifts           Manual Intervention ([id number]) Min:     IASTM     DTM               NMR re-education (30308)   Min:     Mf Activation- re-ed     TrA Re-ed activation     Glute Max re-ed activation Therapeutic Activity (65339) Min:     Bed mobility  Log roll R and R SL'ing to sit  Jose J. Fair to well         Modalities  Min:      US     IFC                 Other Therapeutic Activities:  Pt was educated on PT POC, Diagnosis, Prognosis, pathomechanics as well as frequency and duration of scheduling future physical therapy appointments. Time was also taken on this day to answer all patient questions and participation in PT. Reviewed appointment policy in detail with patient and patient verbalized understanding. Home Exercise Program:     Therapeutic Exercise and NMR EXR  [] (61083) Provided verbal/tactile cueing for activities related to strengthening, flexibility, endurance, ROM  for improvements in proximal hip and core control with self care, mobility, lifting and ambulation.  [] (88832) Provided verbal/tactile cueing for activities related to improving balance, coordination, kinesthetic sense, posture, motor skill, proprioception  to assist with core control in self care, mobility, lifting, and ambulation.      Therapeutic Activities:    [] (88447 or 78566) Provided verbal/tactile cueing for activities related to improving balance, coordination, kinesthetic sense, posture, motor skill, proprioception and motor activation to allow for proper function  with self care and ADLs  [] (91044) Provided training and instruction to the patient for proper core and proximal hip recruitment and positioning with ambulation re-education     Home Exercise Program:    [] (23393) Reviewed/Progressed HEP activities related to strengthening, flexibility, endurance, ROM of core, proximal hip and LE for functional self-care, mobility, lifting and ambulation   [] (95110) Reviewed/Progressed HEP activities related to improving balance, coordination, kinesthetic sense, posture, motor skill, proprioception of core, proximal hip and LE for self care, mobility, lifting, and ambulation      Manual Treatments:  PROM / STM / Oscillations-Mobs:  G-I, II, III, IV (PA's, Inf., Post.)  [] (95798) Provided manual therapy to mobilize proximal hip and LS spine soft tissue/joints for the purpose of modulating pain, promoting relaxation,  increasing ROM, reducing/eliminating soft tissue swelling/inflammation/restriction, improving soft tissue extensibility and allowing for proper ROM for normal function with self care, mobility, lifting and ambulation. Charges:  Timed Code Treatment Minutes: 15   Total Treatment Minutes: 45     [] EVAL (LOW) 05298 (typically 20 minutes face-to-face)  [x] EVAL (MOD) 69122 (typically 30 minutes face-to-face)  [] EVAL (HIGH) 03425 (typically 45 minutes face-to-face)  [] RE-EVAL     [x] PP(25160) x     [] Dry needle 1 or 2 Muscles (69404)  [] NMR (54182) x     [] Dry needle 3+ Muscles (64006)  [] Manual (80735) x     [] Ultrasound (72752) x  [] TA (84402) x     [] Mech Traction (56246)  [] ES(attended) (18319)     [] ES (un) (41832):   [] Vasopump (72144) [] Ionto (40936)   [] Other:    Elmer Zazueta stated goal:\"decrease back and right leg pain\"   []? Progressing: []? Met: []? Not Met: []? Adjusted  Therapist goals for Patient:   Short Term Goals: To be achieved in: 2 weeks  1. Independent in HEP and progression per patient tolerance, in order to prevent re-injury. []? Progressing: []? Met: []? Not Met: []? Adjusted        Long Term Goals: To be achieved in: 6 weeks  1. Disability index score of 50 or less for the [place] to assist with reaching prior level of function. []? Progressing: []? Met: []? Not Met: []? Adjusted  2. Patient will demonstrate increased AROM to [facility], good LS mobility, good hip ROM to allow for proper joint functioning as indicated by patients Functional Deficits. []? Progressing: []? Met: []? Not Met: []? Adjusted  3.  Patient will demonstrate an increase in Strength to good proximal hip and core activation to allow for proper functional mobility as indicated by patients Functional Deficits. []? Progressing: []? Met: []? Not Met: []? Adjusted  4. Patient will have a decrease in pain 2-6/10 to facilitate improvement in movement, function, and ADLs as indicated by Functional Deficits. []? Progressing: []? Met: []? Not Met: []? Adjusted    ASSESSMENT:  See eval    Treatment/Activity Tolerance:  [x] Patient tolerated treatment well [] Patient limited by fatique  [] Patient limited by pain  [] Patient limited by other medical complications  [] Other:     Overall Progression Towards Functional goals/ Treatment Progress Update:  [] Patient is progressing as expected towards functional goals listed. [] Progression is slowed due to complexities/Impairments listed. [] Progression has been slowed due to co-morbidities. [x] Plan just implemented, too soon to assess goals progression <30days   [] Goals require adjustment due to lack of progress  [] Patient is not progressing as expected and requires additional follow up with physician  [] Other:    Prognosis for POC: [x] Good [] Fair  [] Poor    Patient requires continued skilled intervention: [x] Yes  [] No        PLAN: Build core, pt ed with NS with seated, standing and bed mobility   [] Continue per plan of care [] Alter current plan (see comments)  [x] Plan of care initiated [] Hold pending MD visit [] Discharge    Electronically signed by: Dominick Rasheed PT    Note: If patient does not return for scheduled/recommended follow up visits, this note will serve as a discharge from care along with the most recent update on progress.

## 2021-03-30 NOTE — PROGRESS NOTES
[facility]. [address]  Phone: [phone]   Fax:     [phone]                                                       Physical Therapy Certification    Dear Referring Practitioner: Saúl Diehl PA-C,Shan Rivero    We had the pleasure of evaluating the following patient for physical therapy services at [facility]. A summary of our findings can be found in the initial assessment below. This includes our plan of care. If you have any questions or concerns regarding these findings, please do not hesitate to contact me at the office phone number checked above.   Thank you for the referral.       Physician Signature:_______________________________Date:__________________  By signing above (or electronic signature), therapists plan is approved by physician                  Patient: Eldon Santana   : 1960   MRN: [id number]  Referring Physician: Referring Practitioner: Saúl Diehl PA-C      Evaluation Date: 3/30/2021      Medical Diagnosis Information:  Diagnosis: DDD- Lumbar, Spondylolisthesis of lumbar M43.16, M51.36   Treatment Diagnosis: Weak core with LS facet iritation and structural instability                                         Insurance information: [employer] AIM     Precautions/ Contra-indications:   Latex Allergy:  [x]NO      []YES  Preferred Language for Healthcare:   [x]English       []other:    C-SSRS Triggered by Intake questionnaire (Past 2 wk assessment ):   [x] No, Questionnaire did not trigger screening.   [] Yes, Patient intake triggered C-SSRS Screening      [] C-SSRS Screening completed  [] PCP notified via Epic     SUBJECTIVE: Patient stated complaint c/o back and RLE pain for 3-4 years     Relevant Medical History:EDSI 3/25/21  Functional Outcome Measure: [place] = 78    Pain Scale: 6-8/10  Easing factors: heat  Provocative factors: bending, lifting, standing    Type: [x]Constant   []Intermittent  []Radiating []Localized []other:     Numbness/Tingling: RLE    Occupation/School: on disability    Living Status/Prior Level of Function: Independent with ADLs and IADLs,     OBJECTIVE:   Posture: decreased lumbar lordosis    Functional Mobility/Transfers: Independent     Palpation: TTP at bilat.  LS dorsal pvm's    Gait: SPC held at Right , decreased step length, pace and foot clearance     Bandages/Dressings/Incisions: NA    Repeated Movements:  (* increased c/o pain with test)  ROM  Comments   Lumbar Flex Moderate decreased rom*    Lumbar Ext Moderate decreased rom*      ROM LEFT RIGHT Comments   Lumbar Side Bend Moderate decreased rom* Moderate decreased rom*    Lumbar Rotation Moderate decreased rom* Moderate decreased rom*    Quadrant      Hip Flexion 0-95 0-100    Hip Abd      Hip ER 0-30 0-30    Hip IR 0-10 0-10    Hip Extension      Knee Ext      Knee Flex      Hamstring Flex Tight  Tight     Piriformis Tight  Tight     Yanick test                Myotomes/Strength Normal Abnormal Comments   [x]ALL NORMAL      Hip Abd      Hip Ext      Hip flexion (L1-L2 femoral) [x] []    Knee extension (L2-L4 femoral) [x] []    Knee flexion (S1 sciatic) x     Dorsiflexion (L4-L5 deep peroneal) [x] []    Great Toe Ext (L5 deep peroneal nerve) [x] []    Ankle Eversion (S1-S2 super peroneal) [] []    Ankle PF(S1-S2 tibial) [x] []    Multifidus [] []    Transverse Ab [] []      Dermatomes Normal Abnormal Comments   [x]ALL NORMAL            inguinal area (L1)  [] []    anterior mid-thigh (L2) [] []    distal ant thigh/med knee (L3) [] []    medial lower leg and foot (L4) [] []    lateral lower leg and foot (L5) [] []    posterior calf (S1) [] []    medial calcaneus (S2) [] []      Reflexes Normal Abnormal Comments   [x]ALL NORMAL            S1-2 Seated achilles [x] []    S1-2 Prone knee bend [] []    L3-4 Patellar tendon [x] []    C5-6 Biceps [] []    C6 Brachioradialis [] []    C7-8 Triceps [] []    Clonus [] []    Babinski [] []    Bazan's [] []      Joint mobility: LS spine grossly    []Normal    [x]Hypo   []Hyper    Neurodynamics:     Orthopedic Special Tests:   Neural dynamic tension testing Normal Abnormal Comments   Slump Test  - Degree of knee flexion:  [] []    SLR  [] []    0-30 [] []    30-70 [] [x] RLE Increases LBP   Femoral nerve (L2-4) [] [] Pt can not dimas. Prone       Normal Abnormal N/A Comments   Fwd Bend-aberrant or innominate mvmt) [] [] []    Trendelenburg [] [] []    Kemps/Quadrant [] [] []    Katharine Lund [] [] []    ZACH/Ian [] [] []    Hip scour [] [x] [] Tender hypomobile bilat. Supine to sit [] [] []    Prone knee bend [] [] []           Hip thrust [] [] []    SI distraction/compression [] [] []    Sacral Spring/thrust [] [] []               [x] Patient history, allergies, meds reviewed. Medical chart reviewed. See intake form. Review Of Systems (ROS):  [x]Performed Review of systems (Integumentary, CardioPulmonary, Neurological) by intake and observation. Intake form has been scanned into medical record. Patient has been instructed to contact their primary care physician regarding ROS issues if not already being addressed at this time.       Co-morbidities/Complexities (which will affect course of rehabilitation):  []None           Arthritic conditions   []Rheumatoid arthritis (M05.9)  [x]Osteoarthritis (M19.91)   Cardiovascular conditions   [x]Hypertension (I10)  []Hyperlipidemia (E78.5)  []Angina pectoris (I20)  []Atherosclerosis (I70)  []CVA Musculoskeletal conditions   [x]Disc pathology   []Congenital spine pathologies   []Prior surgical intervention  []Osteoporosis (M81.8)  []Osteopenia (M85.8)   Endocrine conditions   []Hypothyroid (E03.9)  []Hyperthyroid Gastrointestinal conditions   []Constipation (J05.33)   Metabolic conditions   []Morbid obesity (E66.01)  []Diabetes type 1(E10.65) or 2 (E11.65)   []Neuropathy (G60.9)     Pulmonary conditions   []Asthma (J45)  []Coughing   []COPD (J44.9)   Psychological Disorders  []Anxiety (F41.9)  [x]Depression (F32.9)   []Other:   [x]Other:     H/o chronic back pain       Barriers to/and or personal factors that will affect rehab potential:              []Age  []Sex    []Smoker              []Motivation/Lack of Motivation                        [x]Co-Morbidities              []Cognitive Function, education/learning barriers              []Environmental, home barriers              []profession/work barriers  []past PT/medical experience  []other:  Justification:     Falls Risk Assessment (30 days):   [x] Falls Risk assessed and no intervention required. [] Falls Risk assessed and Patient requires intervention due to being higher risk   TUG score (>12s at risk):     [] Falls education provided, including:         ASSESSMENT:   Functional Impairments:  Skilled PT services will be required to address the following.    [x]Noted lumbar/proximal hip hypomobility   []Noted lumbosacral and/or generalized hypermobility   [x]Decreased Lumbosacral/hip/LE functional ROM   [x]Decreased core/proximal hip strength and neuromuscular control    []Decreased LE functional strength    []Abnormal reflexes/sensation/myotomal/dermatomal deficits  []Reduced balance/proprioceptive control    []other:      Functional Activity Limitations (from functional questionnaire and intake)   []Reduced ability to tolerate prolonged functional positions   []Reduced ability or difficulty with changes of positions or transfers between positions   [x]Reduced ability to maintain good posture and demonstrate good body mechanics with sitting, bending, and lifting   [x]Reduced ability to sleep   [x] Reduced ability or tolerance with driving and/or computer work   [x]Reduced ability to perform lifting, reaching, carrying tasks   [x]Reduced ability to squat   [x]Reduced ability to forward bend   [x]Reduced ability to ambulate prolonged functional periods/distances/surfaces   []Reduced ability to ascend/descend stairs   []other:       Participation Restrictions   []Reduced participation in self care activities   [x]Reduced participation in home management activities   []Reduced participation in work activities   []Reduced participation in social activities. []Reduced participation in sport/recreational activities. Classification:   [x]Signs/symptoms consistent with Lumbar instability/stabilization subgroup. []Signs/symptoms consistent with Lumbar mobilization/manipulation subgroup, myotomes and dermatomes intact. Meets manipulation criteria. []Signs/symptoms consistent with Lumbar direction specific/centralization subgroup   []Signs/symptoms consistent with Lumbar traction subgroup       []Signs/symptoms consistent with lumbar facet dysfunction   []Signs/symptoms consistent with lumbar stenosis type dysfunction   [x]Signs/symptoms consistent with nerve root involvement including myotome & dermatome dysfunction   []Signs/symptoms consistent with post-surgical status including: decreased ROM, strength and function.    []signs/symptoms consistent with pathology which may benefit from Dry needling     []other:      Prognosis/Rehab Potential:      []Excellent   []Good    [x]Fair   []Poor    Tolerance of evaluation/treatment:    []Excellent   []Good    [x]Fair   []Poor     Physical Therapy Evaluation Complexity Justification  [x] A history of present problem with:  [] no personal factors and/or comorbidities that impact the plan of care;  []1-2 personal factors and/or comorbidities that impact the plan of care  [x]3 personal factors and/or comorbidities that impact the plan of care  [x] An examination of body systems using standardized tests and measures addressing any of the following: body structures and functions (impairments), activity limitations, and/or participation restrictions;:  [] a total of 1-2 or more elements   [] a total of 3 or more elements   [x] a total of 4 or more elements   [x] A clinical presentation with:  [x] stable and/or uncomplicated characteristics   [] evolving clinical presentation with changing characteristics  [] unstable and unpredictable characteristics;   [x] Clinical decision making of [] low, [] moderate, [] high complexity using standardized patient assessment instrument and/or measurable assessment of functional outcome. [x] EVAL (LOW) 33591 (typically 20 minutes face-to-face)  [] EVAL (MOD) 53836 (typically 30 minutes face-to-face)  [] EVAL (HIGH) 99957 (typically 45 minutes face-to-face)  [] RE-EVAL     PLAN: Begin PT focusing on: core stabilization , pt ed with neutral spine     Frequency/Duration:  2 days per week for 4-6Weeks:  Interventions:  [x]  Therapeutic exercise including: strength training, ROM, for LE, Glutes and core   [x]  NMR activation and proprioception for glutes , LE and Core   [x]  Manual therapy as indicated for Hip complex, LE and spine to include: Dry Needling/IASTM, STM, PROM, Gr I-IV mobilizations, manipulation. [x]  Modalities as needed that may include: thermal agents, E-stim, Biofeedback, US, iontophoresis as indicated  [x]  Patient education on joint protection, postural re-education, activity modification, progression of HEP. HEP instruction:     GOALS:  Patient stated goal:\"decrease back and right leg pain\"   [] Progressing: [] Met: [] Not Met: [] Adjusted  Therapist goals for Patient:   Short Term Goals: To be achieved in: 2 weeks  1. Independent in HEP and progression per patient tolerance, in order to prevent re-injury. [] Progressing: [] Met: [] Not Met: [] Adjusted      Long Term Goals: To be achieved in: 6 weeks  1. Disability index score of 50 or less for the [place] to assist with reaching prior level of function. [] Progressing: [] Met: [] Not Met: [] Adjusted  2.  Patient will demonstrate increased AROM to [facility], good LS mobility, good hip ROM to allow for proper joint functioning as indicated by patients Functional Deficits. [] Progressing: [] Met: [] Not Met: [] Adjusted  3. Patient will demonstrate an increase in Strength to good proximal hip and core activation to allow for proper functional mobility as indicated by patients Functional Deficits. [] Progressing: [] Met: [] Not Met: [] Adjusted  4. Patient will have a decrease in pain 2-6/10 to facilitate improvement in movement, function, and ADLs as indicated by Functional Deficits. [] Progressing: [] Met: [] Not Met: [] Adjusted    Electronically signed by:  Anam Barrett PT        Note: If patient does not return for scheduled/recommended follow up visits, this note will serve as a discharge from care along with the most recent update on progress.

## 2021-04-07 ENCOUNTER — HOSPITAL ENCOUNTER (OUTPATIENT)
Dept: PHYSICAL THERAPY | Age: 61
Setting detail: THERAPIES SERIES
Discharge: HOME | End: 2021-04-07
Payer: COMMERCIAL

## 2021-04-07 NOTE — FLOWSHEET NOTE
Physical Therapy  Cancellation/No-show Note  Patient Name:  Leslie Mayo  :  1960   Date:  2021  Cancelled visits to date: 1  No-shows to date: 0    For today's appointment patient:  [x]  Cancelled  []  Rescheduled appointment  []  No-show     Reason given by patient:  []  Patient ill  []  Conflicting appointment  []  No transportation    []  Conflict with work  []  No reason given  [x]  Other:     Comments:   21 Phoned pt. Due to assumed No show. No message from . Pt reports she phoned to cancel indicating she was canceling today as she was too sore from her Covid injection. She indicated she would attend her 21 session.      Electronically signed by:  Francie Alanis, PT

## 2021-04-09 ENCOUNTER — HOSPITAL ENCOUNTER (OUTPATIENT)
Dept: PHYSICAL THERAPY | Age: 61
Setting detail: THERAPIES SERIES
Discharge: HOME | End: 2021-04-09
Payer: COMMERCIAL

## 2021-04-09 PROCEDURE — 97035 APP MDLTY 1+ULTRASOUND EA 15: CPT

## 2021-04-09 PROCEDURE — 97140 MANUAL THERAPY 1/> REGIONS: CPT

## 2021-04-09 NOTE — FLOWSHEET NOTE
[facility] [place]. [address]  Phone: [phone]   Fax:     [phone]    Physical Therapy Treatment Note/ Progress Report:     Date:  2021    Patient Name:  Riddhi Puentes    :  1960  MRN: [id number]    Pertinent Medical History:      Medical/Treatment Diagnosis Information:  · Diagnosis: DDD- Lumbar, Spondylolisthesis of lumbar M43.16, M51.36  · Treatment Diagnosis: Weak core with LS facet iritation and structural instability    Insurance/Certification information:  Nita Tapia 150 AIM  Physician Information:  Referring Practitioner: Dejuan Purcell PA-C  Plan of care signed (Y/N): routed 3/30/21    Date of Patient follow up with Physician:      Progress Report: []  Yes  [x]  No     Date Range for reporting period:  Beginnin2021  Ending:    Progress report due (10 Rx/or 30 days whichever is less):     Recertification due (POC duration/ or 90 days whichever is less):    Visit # POC/ Insurance Allowable Auth Needed   2 AIM [x]Yes    []No     Functional Scale:       Date Assessed: at eval  Test: Tadevinkian = 78  Score:     Pain level:  7/10     History of Injury:EDSI 3/25/21    SUBJECTIVE:  Patient stated complaint c/o back and RLE pain for 3-4 years   21 Pt reports increased back soreness today from doing laundry yesterday.      OBJECTIVE:    Observation:    Test measurements:      RESTRICTIONS/PRECAUTIONS:     Exercises/Interventions:     Therapeutic Ex (66615)   Min: Resistance/Reps Notes/Cues   HSS                                   Mat ex     PPT  March  Alt shld flex     PPT 5\" 15 x    Supine rectus fem stretch  3\" x 25    SL'ing trunk rotation  2 breath x 20     Clams      SKTC  Poor tolerance to RLE due to LBP   Piriformis strech     Bridge     Seated hip abd vs t band      Prone alt leg lifts           Manual Intervention (57008) Min: 25    IASTM Bilat dorsal TLS pvm's and QL's     DTM To above muscles    Stretch  R QL and R LS muscles     Reji massage To all above muscles     NMR re-education (70563)   Min:     Mf Activation- re-ed     TrA Re-ed activation     Glute Max re-ed activation          Therapeutic Activity (91239) Min:     Bed mobility  Log roll R and R SL'ing to sit  Jose J. Fair to well         Modalities  Min:      % x 8 min @ 1.6 to Bilat LS pvm's    IFC                 Other Therapeutic Activities:  Pt was educated on PT POC, Diagnosis, Prognosis, pathomechanics as well as frequency and duration of scheduling future physical therapy appointments. Time was also taken on this day to answer all patient questions and participation in PT. Reviewed appointment policy in detail with patient and patient verbalized understanding. Home Exercise Program:     Therapeutic Exercise and NMR EXR  [] (80615) Provided verbal/tactile cueing for activities related to strengthening, flexibility, endurance, ROM  for improvements in proximal hip and core control with self care, mobility, lifting and ambulation.  [] (34425) Provided verbal/tactile cueing for activities related to improving balance, coordination, kinesthetic sense, posture, motor skill, proprioception  to assist with core control in self care, mobility, lifting, and ambulation.      Therapeutic Activities:    [] (13832 or 44194) Provided verbal/tactile cueing for activities related to improving balance, coordination, kinesthetic sense, posture, motor skill, proprioception and motor activation to allow for proper function  with self care and ADLs  [] (81008) Provided training and instruction to the patient for proper core and proximal hip recruitment and positioning with ambulation re-education     Home Exercise Program:    [] (83981) Reviewed/Progressed HEP activities related to strengthening, flexibility, endurance, ROM of core, proximal hip and LE for functional self-care, mobility, lifting and ambulation [] (30062) Reviewed/Progressed HEP activities related to improving balance, coordination, kinesthetic sense, posture, motor skill, proprioception of core, proximal hip and LE for self care, mobility, lifting, and ambulation      Manual Treatments:  PROM / STM / Oscillations-Mobs:  G-I, II, III, IV (PA's, Inf., Post.)  [] (09438) Provided manual therapy to mobilize proximal hip and LS spine soft tissue/joints for the purpose of modulating pain, promoting relaxation,  increasing ROM, reducing/eliminating soft tissue swelling/inflammation/restriction, improving soft tissue extensibility and allowing for proper ROM for normal function with self care, mobility, lifting and ambulation. Charges:  Timed Code Treatment Minutes: 45   Total Treatment Minutes: 48     [] EVAL (LOW) 06604 (typically 20 minutes face-to-face)  [] EVAL (MOD) 76266 (typically 30 minutes face-to-face)  [] EVAL (HIGH) 44786 (typically 45 minutes face-to-face)  [] RE-EVAL     [] OQ(14657) x     [] Dry needle 1 or 2 Muscles (04619)  [] NMR (15673) x     [] Dry needle 3+ Muscles (56103)  [x] Manual (19054) x 2    [x] Ultrasound (36531) x  [] TA (25360) x     [] Mech Traction (31164)  [] ES(attended) (13075)     [] ES (un) (49835):   [] Vasopump (95114) [] Ionto (69046)   [] Other:    Mitzi Pugerard stated goal:\"decrease back and right leg pain\"   []? Progressing: []? Met: []? Not Met: []? Adjusted  Therapist goals for Patient:   Short Term Goals: To be achieved in: 2 weeks  1. Independent in HEP and progression per patient tolerance, in order to prevent re-injury. []? Progressing: []? Met: []? Not Met: []? Adjusted        Long Term Goals: To be achieved in: 6 weeks  1. Disability index score of 50 or less for the [place] to assist with reaching prior level of function. []? Progressing: []? Met: []? Not Met: []? Adjusted  2.  Patient will demonstrate increased AROM to [facility], good LS mobility, good hip ROM to allow for proper joint functioning as

## 2021-04-20 ENCOUNTER — HOSPITAL ENCOUNTER (OUTPATIENT)
Dept: PHYSICAL THERAPY | Age: 61
Setting detail: THERAPIES SERIES
Discharge: HOME | End: 2021-04-20
Payer: COMMERCIAL

## 2021-04-20 PROCEDURE — 97035 APP MDLTY 1+ULTRASOUND EA 15: CPT

## 2021-04-20 PROCEDURE — 97110 THERAPEUTIC EXERCISES: CPT

## 2021-04-20 PROCEDURE — 97140 MANUAL THERAPY 1/> REGIONS: CPT

## 2021-04-20 NOTE — FLOWSHEET NOTE
[facility] [place]. [address] Jeffery [address]  Phone: [phone]   Fax:     [phone]    Physical Therapy Treatment Note/ Progress Report:     Date:  2021    Patient Name:  Neto Nowak    :  1960  MRN: [id number]    Pertinent Medical History:      Medical/Treatment Diagnosis Information:  · Diagnosis: DDD- Lumbar, Spondylolisthesis of lumbar M43.16, M51.36  · Treatment Diagnosis: Weak core with LS facet iritation and structural instability    Insurance/Certification information:  Nita Tapia 150 AIM  Physician Information:  Referring Practitioner: Sherrie Cavazos PA-C  Plan of care signed (Y/N): routed 3/30/21    Date of Patient follow up with Physician:      Progress Report: []  Yes  [x]  No     Date Range for reporting period:  Beginnin2021  Ending:    Progress report due (10 Rx/or 30 days whichever is less):     Recertification due (POC duration/ or 90 days whichever is less):    Visit # POC/ Insurance Allowable Auth Needed   3 20 pcy [x]Yes    []No     Functional Scale:       Date Assessed: at eval  Test: Tadevinkian = 78  Score:     Pain level:  7/10     History of Injury:EDSI 3/25/21    SUBJECTIVE:  Patient stated complaint c/o back and RLE pain for 3-4 years   21 Pt reports increased back soreness today from doing laundry yesterday. 21 Pt reports the last rx helped to ease pain for 3-4 days. OBJECTIVE:    Observation:    Test measurements:      RESTRICTIONS/PRECAUTIONS:     Exercises/Interventions:     Therapeutic Ex (82108)   Min: Resistance/Reps Notes/Cues   HSS                                   Mat ex     PPT  March  Alt shld flex 10\" x 10  ADD  ADD     PPT 5\" 15 x    Supine rectus fem stretch      SL'ing trunk rotation  2 breath x 10 L/R ea    Clams      SKTC  Poor tolerance to RLE due to LBP   Piriformis strech     Bridge Part.  Bridges 5\" 15 x    Seated hip abd vs t band      Prone alt leg lifts           Manual Intervention (41600) Min: 25    IASTM Bilat dorsal TLS pvm's and QL's     DTM To above muscles    Stretch  R QL and R LS muscles     Reji massage To all above muscles     NMR re-education (68242)   Min:     Mf Activation- re-ed     TrA Re-ed activation     Glute Max re-ed activation          Therapeutic Activity (01033) Min:     Bed mobility  Log roll R and R SL'ing to sit  Jose J. Fair to well         Modalities  Min:      % x 8 min @ 1.6 to Bilat LS pvm's    IFC                 Other Therapeutic Activities:  Pt was educated on PT POC, Diagnosis, Prognosis, pathomechanics as well as frequency and duration of scheduling future physical therapy appointments. Time was also taken on this day to answer all patient questions and participation in PT. Reviewed appointment policy in detail with patient and patient verbalized understanding. Home Exercise Program:   Access Code: [email or web address]: [email or web address]/Date: 04/20/2021Prepared by: Raymon WynneExercnaomi   Supine Pelvic Tilt - 1 x daily - 7 x weekly - 1-2 sets - 10 reps - 10 hold   Sidelying Thoracic and Shoulder Rotation - 1 x daily - 7 x weekly - 1 sets - 10 reps - 10 hold  The patient demonstrated good tolerance to and understanding of the HEP. Written instructions have been issued. Therapeutic Exercise and NMR EXR  [] (07800) Provided verbal/tactile cueing for activities related to strengthening, flexibility, endurance, ROM  for improvements in proximal hip and core control with self care, mobility, lifting and ambulation.  [] (19360) Provided verbal/tactile cueing for activities related to improving balance, coordination, kinesthetic sense, posture, motor skill, proprioception  to assist with core control in self care, mobility, lifting, and ambulation.      Therapeutic Activities:    [] (35934 or ) Provided verbal/tactile cueing for activities related to improving balance, coordination, kinesthetic sense, posture, motor skill, proprioception and motor activation to allow for proper function  with self care and ADLs  [] (37186) Provided training and instruction to the patient for proper core and proximal hip recruitment and positioning with ambulation re-education     Home Exercise Program:    [] (29986) Reviewed/Progressed HEP activities related to strengthening, flexibility, endurance, ROM of core, proximal hip and LE for functional self-care, mobility, lifting and ambulation   [] (14250) Reviewed/Progressed HEP activities related to improving balance, coordination, kinesthetic sense, posture, motor skill, proprioception of core, proximal hip and LE for self care, mobility, lifting, and ambulation      Manual Treatments:  PROM / STM / Oscillations-Mobs:  G-I, II, III, IV (PA's, Inf., Post.)  [] (90229) Provided manual therapy to mobilize proximal hip and LS spine soft tissue/joints for the purpose of modulating pain, promoting relaxation,  increasing ROM, reducing/eliminating soft tissue swelling/inflammation/restriction, improving soft tissue extensibility and allowing for proper ROM for normal function with self care, mobility, lifting and ambulation. Charges:  Timed Code Treatment Minutes: 45   Total Treatment Minutes: 48     [] EVAL (LOW) 44914 (typically 20 minutes face-to-face)  [] EVAL (MOD) 70699 (typically 30 minutes face-to-face)  [] EVAL (HIGH) 38138 (typically 45 minutes face-to-face)  [] RE-EVAL     [x] OR(36361) x     [] Dry needle 1 or 2 Muscles (88853)  [] NMR (61978) x     [] Dry needle 3+ Muscles (91096)  [x] Manual (58640) x     [x] Ultrasound (07822) x  [] TA (83361) x     [] Mech Traction (84143)  [] ES(attended) (52442)     [] ES (un) (67657):   [] Vasopump (50219) [] Ionto (43711)   [] Other:    [address] stated goal:\"decrease back and right leg pain\"   []? Progressing: []? Met: []? Not Met: []? Adjusted  Therapist goals for Patient:   Short Term Goals:  To be achieved in: 2 weeks  1. Independent in HEP and progression per patient tolerance, in order to prevent re-injury. []? Progressing: []? Met: []? Not Met: []? Adjusted        Long Term Goals: To be achieved in: 6 weeks  1. Disability index score of 50 or less for the [place] to assist with reaching prior level of function. []? Progressing: []? Met: []? Not Met: []? Adjusted  2. Patient will demonstrate increased AROM to [facility], good LS mobility, good hip ROM to allow for proper joint functioning as indicated by patients Functional Deficits. []? Progressing: []? Met: []? Not Met: []? Adjusted  3. Patient will demonstrate an increase in Strength to good proximal hip and core activation to allow for proper functional mobility as indicated by patients Functional Deficits. []? Progressing: []? Met: []? Not Met: []? Adjusted  4. Patient will have a decrease in pain 2-6/10 to facilitate improvement in movement, function, and ADLs as indicated by Functional Deficits. []? Progressing: []? Met: []? Not Met: []? Adjusted    ASSESSMENT:  See eval    Treatment/Activity Tolerance:  [x] Patient tolerated treatment well [] Patient limited by fatique  [] Patient limited by pain  [] Patient limited by other medical complications  [] Other:     Overall Progression Towards Functional goals/ Treatment Progress Update:  [] Patient is progressing as expected towards functional goals listed. [x] Progression is slowed due to complexities/Impairments listed. [] Progression has been slowed due to co-morbidities.   [] Plan just implemented, too soon to assess goals progression <30days   [] Goals require adjustment due to lack of progress  [] Patient is not progressing as expected and requires additional follow up with physician  [] Other:    Prognosis for POC: [x] Good [] Fair  [] Poor    Patient requires continued skilled intervention: [x] Yes  [] No        PLAN:                                   Build core, pt ed with NS with seated, standing and bed mobility   [] Continue per plan of care [] Alter current plan (see comments)  [x] Plan of care initiated [] Hold pending MD visit [] Discharge    Electronically signed by: Anuj Koch PT    Note: If patient does not return for scheduled/recommended follow up visits, this note will serve as a discharge from care along with the most recent update on progress.

## 2021-05-12 ENCOUNTER — HOSPITAL ENCOUNTER (OUTPATIENT)
Dept: PHYSICAL THERAPY | Age: 61
Setting detail: THERAPIES SERIES
Discharge: HOME | End: 2021-05-12
Payer: COMMERCIAL

## 2021-05-12 NOTE — PROGRESS NOTES
[place]. [address] [place] [address]  Phone: [phone]   Fax:     [phone]    Physical Therapy Discharge Summary    Dear   Enrrique Yin,    We had the pleasure of treating the following patient for physical therapy services at [address] [place] [address]. A summary of our findings can be found in the discharge summary below. This includes our final assessment. If you have any questions or concerns regarding these findings, please do not hesitate to contact me at the office phone number checked above. Thank you for the referral.       Physician Signature:________________________________Date:__________________  By signing above, therapists plan is approved by physician        Patient: Mike Sewell   : 1960   MRN: [id number]  Referring Physician:        Evaluation Date: 2021        Medical/Treatment Diagnosis Information:  · Diagnosis: DDD- Lumbar, Spondylolisthesis of lumbar M43.16, M51.36  · Treatment Diagnosis: Weak core with LS facet iritation and structural instability    Insurance/Certification information:       Date Range of Treatment: 3/30/21 to 21  Total visits:3      Functional Outcomes Measure: not assessed due to missed sessions  Test:  Score:    SUBJECTIVE:last attended session  21 Pt reports the last rx helped to ease pain for 3-4 days. Pain Scale: 3-4/10    Type: []Constant   [x]Intermitment  []Radiating []Localized  []other:     Functional Limitations: []Sitting []Standing []Walking    []Squatting []Stairs            []ADL's  []Driving []Sports/Recreation  []Other:      OBJECTIVE:tight / guarded Bilat.  TLS pvm's , hypomobile TLS spine         ASSESSMENT:      Response to Treatment:   []Patient met all goals and has responded well to treatment and will continue HEP   []Patient should continue to improve in reasonable time if they continue HEP   []Patient has plateaued and is no longer responding to skilled PT intervention    []Patient is getting worse and would benefit from return to referring MD   [x]Patient unable to adhere to initial POC  2 consecutive no shows 4/28 and 5/12/21       GOALS: Laverne Hoffmann stated goal:\"decrease back and right leg pain\"   [x]? ? Progressing: []?? Met: []?? Not Met: []?? Adjusted  Therapist goals for Patient:   Short Term Goals: To be achieved in: 2 weeks  1. Independent in HEP and progression per patient tolerance, in order to prevent re-injury. [x]? ? Progressing: []?? Met: []?? Not Met: []?? Adjusted        Long Term Goals: To be achieved in: 6 weeks  1. Disability index score of 50 or less for the [facility] assist with reaching prior level of function. [x]? ? Progressing: []?? Met: []?? Not Met: []?? Adjusted  2. Patient will demonstrate increased [facility]OM to [facility], good LS mobility, good hip ROM to allow for proper joint functioning as indicated by patients Functional Deficits. []?? Progressing: []?? Met: [x]? ? Not Met: []?? Adjusted  3. Patient will demonstrate an increase in Strength to good proximal hip and core activation to allow for proper functional mobility as indicated by patients Functional Deficits. []?? Progressing: []?? Met: [x]? ? Not Met: []?? Adjusted  4. Patient will have a decrease in pain 2-6/10 to facilitate improvement in movement, function, and ADLs as indicated by Functional Deficits. []?? Progressing: [x]? ? Met: []?? Not Met: []?? Adjusted     PLAN:    Reason for Discharge:  [] Goals Met   [] Patient did not return after initial evaluation   [] Progress Plateaued  [x] Missed ( no showed)_2 consecutive____ scheduled appointments   [] No insurance coverage [] Patient terminated therapy   [] MD discharged from PT [] Financial Limitations  [] Other:      Electronically signed by:  Yanique Palmer, PT

## 2021-05-18 ENCOUNTER — OFFICE VISIT (OUTPATIENT)
Dept: PRIMARY CARE CLINIC | Age: 61
End: 2021-05-18
Payer: COMMERCIAL

## 2021-05-18 VITALS
BODY MASS INDEX: 24.86 KG/M2 | OXYGEN SATURATION: 99 % | DIASTOLIC BLOOD PRESSURE: 80 MMHG | HEART RATE: 73 BPM | WEIGHT: 154 LBS | SYSTOLIC BLOOD PRESSURE: 122 MMHG

## 2021-05-18 DIAGNOSIS — M48.061 NEURAL FORAMINAL STENOSIS OF LUMBAR SPINE: ICD-10-CM

## 2021-05-18 DIAGNOSIS — Z78.0 MENOPAUSE: ICD-10-CM

## 2021-05-18 DIAGNOSIS — I10 ESSENTIAL HYPERTENSION: Primary | ICD-10-CM

## 2021-05-18 DIAGNOSIS — Z12.11 SCREEN FOR COLON CANCER: ICD-10-CM

## 2021-05-18 DIAGNOSIS — Z00.00 ENCOUNTER FOR PREVENTATIVE ADULT HEALTH CARE EXAMINATION: ICD-10-CM

## 2021-05-18 DIAGNOSIS — M54.32 SCIATICA, LEFT SIDE: ICD-10-CM

## 2021-05-18 DIAGNOSIS — I10 ESSENTIAL HYPERTENSION: ICD-10-CM

## 2021-05-18 DIAGNOSIS — K27.9 PUD (PEPTIC ULCER DISEASE): ICD-10-CM

## 2021-05-18 DIAGNOSIS — Z01.00 ROUTINE EYE EXAM: ICD-10-CM

## 2021-05-18 LAB
A/G RATIO: 1.4 (ref 1.1–2.2)
ALBUMIN SERPL-MCNC: 4.6 G/DL (ref 3.4–5)
ALP BLD-CCNC: 91 U/L (ref 40–129)
ALT SERPL-CCNC: 11 U/L (ref 10–40)
ANION GAP SERPL CALCULATED.3IONS-SCNC: 9 MMOL/L (ref 3–16)
AST SERPL-CCNC: 14 U/L (ref 15–37)
BASOPHILS ABSOLUTE: 0 K/UL (ref 0–0.2)
BASOPHILS RELATIVE PERCENT: 1.1 %
BILIRUB SERPL-MCNC: 0.4 MG/DL (ref 0–1)
BILIRUBIN URINE: NEGATIVE
BLOOD, URINE: NEGATIVE
BUN BLDV-MCNC: 17 MG/DL (ref 7–20)
CALCIUM SERPL-MCNC: 9.7 MG/DL (ref 8.3–10.6)
CHLORIDE BLD-SCNC: 102 MMOL/L (ref 99–110)
CHOLESTEROL, TOTAL: 193 MG/DL (ref 0–199)
CLARITY: CLEAR
CO2: 30 MMOL/L (ref 21–32)
COLOR: YELLOW
CREAT SERPL-MCNC: 0.9 MG/DL (ref 0.6–1.1)
EOSINOPHILS ABSOLUTE: 0 K/UL (ref 0–0.6)
EOSINOPHILS RELATIVE PERCENT: 1 %
GFR AFRICAN AMERICAN: >60
GFR NON-AFRICAN AMERICAN: >60
GLOBULIN: 3.2 G/DL
GLUCOSE BLD-MCNC: 92 MG/DL (ref 70–99)
GLUCOSE URINE: NEGATIVE MG/DL
HCT VFR BLD CALC: 42.4 % (ref 36–48)
HDLC SERPL-MCNC: 68 MG/DL (ref 40–60)
HEMOGLOBIN: 13.8 G/DL (ref 12–16)
KETONES, URINE: NEGATIVE MG/DL
LDL CHOLESTEROL CALCULATED: 104 MG/DL
LEUKOCYTE ESTERASE, URINE: NEGATIVE
LYMPHOCYTES ABSOLUTE: 1.3 K/UL (ref 1–5.1)
LYMPHOCYTES RELATIVE PERCENT: 31.2 %
MCH RBC QN AUTO: 28.5 PG (ref 26–34)
MCHC RBC AUTO-ENTMCNC: 32.6 G/DL (ref 31–36)
MCV RBC AUTO: 87.4 FL (ref 80–100)
MICROSCOPIC EXAMINATION: NORMAL
MONOCYTES ABSOLUTE: 0.3 K/UL (ref 0–1.3)
MONOCYTES RELATIVE PERCENT: 7.5 %
NEUTROPHILS ABSOLUTE: 2.5 K/UL (ref 1.7–7.7)
NEUTROPHILS RELATIVE PERCENT: 59.2 %
NITRITE, URINE: NEGATIVE
PDW BLD-RTO: 14.4 % (ref 12.4–15.4)
PH UA: 6.5 (ref 5–8)
PHOSPHORUS: 3.6 MG/DL (ref 2.5–4.9)
PLATELET # BLD: 276 K/UL (ref 135–450)
PMV BLD AUTO: 9.1 FL (ref 5–10.5)
POTASSIUM SERPL-SCNC: 4.7 MMOL/L (ref 3.5–5.1)
PROTEIN UA: NEGATIVE MG/DL
RBC # BLD: 4.85 M/UL (ref 4–5.2)
SODIUM BLD-SCNC: 141 MMOL/L (ref 136–145)
SPECIFIC GRAVITY UA: 1.02 (ref 1–1.03)
TOTAL PROTEIN: 7.8 G/DL (ref 6.4–8.2)
TRIGL SERPL-MCNC: 107 MG/DL (ref 0–150)
TSH REFLEX FT4: 1.54 UIU/ML (ref 0.27–4.2)
URINE TYPE: NORMAL
UROBILINOGEN, URINE: 1 E.U./DL
VITAMIN D 25-HYDROXY: 22 NG/ML
VLDLC SERPL CALC-MCNC: 21 MG/DL
WBC # BLD: 4.2 K/UL (ref 4–11)

## 2021-05-18 PROCEDURE — 99214 OFFICE O/P EST MOD 30 MIN: CPT | Performed by: NURSE PRACTITIONER

## 2021-05-18 RX ORDER — DEXLANSOPRAZOLE 60 MG/1
CAPSULE, DELAYED RELEASE ORAL
Qty: 30 CAPSULE | Refills: 3 | Status: SHIPPED | OUTPATIENT
Start: 2021-05-18 | End: 2021-09-08 | Stop reason: SDUPTHER

## 2021-05-18 RX ORDER — OLMESARTAN MEDOXOMIL AND HYDROCHLOROTHIAZIDE 40/12.5 40; 12.5 MG/1; MG/1
1 TABLET ORAL DAILY
Qty: 90 TABLET | Refills: 1 | Status: SHIPPED | OUTPATIENT
Start: 2021-05-18 | End: 2021-09-08 | Stop reason: SDUPTHER

## 2021-05-18 ASSESSMENT — ENCOUNTER SYMPTOMS
SHORTNESS OF BREATH: 0
STRIDOR: 0
CONSTIPATION: 0
DIARRHEA: 0
WHEEZING: 0
ALLERGIC/IMMUNOLOGIC COMMENTS: HISTORY OF ALLERGIC RHINITIS
ABDOMINAL PAIN: 0
CHEST TIGHTNESS: 0

## 2021-05-18 NOTE — PROGRESS NOTES
[facility] [place] [address] [id number]  Dept: [phone]    2021     Rachel Canales (:  1960)is a 63 y.o. female, here for evaluation of the following medical concerns:    Hypertension  This is a chronic problem. The current episode started more than 1 year ago. The problem is controlled. Pertinent negatives include no chest pain, headaches, malaise/fatigue, neck pain, palpitations or shortness of breath. There are no associated agents to hypertension. Risk factors for coronary artery disease include post-menopausal state and sedentary lifestyle. Past treatments include angiotensin blockers and diuretics. The current treatment provides significant improvement. Compliance problems include diet and exercise. There is no history of angina, CAD/MI, CVA or left ventricular hypertrophy. Gastroesophageal Reflux  She reports no abdominal pain, no chest pain, no stridor or no wheezing. This is a chronic problem. The problem occurs occasionally. The symptoms are aggravated by certain foods. Pertinent negatives include no anemia, muscle weakness or orthopnea. She has tried a PPI for the symptoms. The treatment provided mild relief. Back Pain   This is a chronic problem. The current episode started more than 1 year ago. The problem occurs constantly. The problem is unchanged. The pain is present in the lumbar spine. The quality of the pain is described as aching and burning. The pain is the same all the time. The symptoms are aggravated by bending, standing and twisting. Associated symptoms include leg pain, numbness, tingling and weakness. Pertinent negatives include no abdominal pain,  bladder incontinence, bowel incontinence, chest pain, dysuria, fever, headaches or pelvic pain. Treatments tried: gabapentin and she ambulates with 4 prong cane. She is without her cane today, states she misplaced it. She had disability paperwork, will apply again.  Has almost completed physical therapy and is concerned about next step.        Health Care Maintenance:  Breast cancer screen:UT  Colon cancer screen: has colonoscopy scheduled  Cervical cancer screen: [address]  Covid Vaccine: UTD  Shingles Vaccine: obtain at local pharmacy  Lab Results   Component Value Date    CHOL 206 (H) 11/29/2012    CHOL 186 12/16/2011     Lab Results   Component Value Date    TRIG 86 12/16/2011     Lab Results   Component Value Date    HDL 72 (H) 12/16/2011     Lab Results   Component Value Date    LDLCALC 97 12/16/2011     Lab Results   Component Value Date    LABVLDL 17 12/16/2011     Lab Results   Component Value Date    WBC 6.3 04/01/2020    HGB 14.1 04/01/2020    HCT 42.8 04/01/2020    MCV 86.1 04/01/2020     04/01/2020     Lab Results   Component Value Date     04/01/2020    K 4.1 04/01/2020     04/01/2020    CO2 28 04/01/2020    BUN 14 04/01/2020    CREATININE 0.8 04/01/2020    GLUCOSE 105 (H) 04/01/2020    CALCIUM 9.9 04/01/2020    PROT 8.4 (H) 04/01/2020    LABALBU 4.5 04/01/2020    BILITOT 0.5 04/01/2020    ALKPHOS 98 04/01/2020    AST 14 (L) 04/01/2020    ALT 14 04/01/2020    LABGLOM >60 04/01/2020    GFRAA >60 04/01/2020    AGRATIO 1.2 04/01/2020    GLOB 3.9 04/01/2020       Review of Systems   Constitutional: Negative for activity change, fever and malaise/fatigue. HENT: Negative for congestion. Eyes: Negative for visual disturbance. Respiratory: Negative for chest tightness, shortness of breath and wheezing. Cardiovascular: Negative for chest pain, palpitations and leg swelling. History of hypertension, managed on Olmesartan/HCTZ.  1/2021 Mammogram revealed 5mm mass right breast, recommend follow up in 6 months. Gastrointestinal: Negative for abdominal pain, constipation and diarrhea. GERD managed with Dexilant  Ventral hernia repair 12/2012   Endocrine: Negative for polyuria. Genitourinary: Negative for dysuria. Musculoskeletal: Negative for arthralgias, myalgias, muscle weakness and neck pain. History of osteoarthritis of right knee  2020 MRI:   1. Moderate to severe spinal canal stenosis at L4-L5  2. Multilevel neural foraminal narrowing as above. 3. Loss of disc space height with endplate irregularity  Modic type 1 degenerative endplate changes at A8-V5.     Trochanteric bursitis of the left and right hip and mild degenerative arthritis of left and right knee. Skin: Negative for rash. Allergic/Immunologic:        History of allergic rhinitis   Neurological: Negative for dizziness, light-headedness and headaches. History of headaches   Psychiatric/Behavioral: Negative for agitation, decreased concentration and sleep disturbance. The patient is not nervous/anxious. History of depression       Prior to Visit Medications    Medication Sig Taking? Authorizing Provider   olmesartan-hydroCHLOROthiazide (BENICAR HCT) 40-12.5 MG per tablet Take 1 tablet by mouth daily Yes Elianate Jaycob, APRN - CNP   dexlansoprazole (DEXILANT) 60 MG CPDR delayed release capsule Take 1 tablet daily Yes Elianate Jaycob, APRN - CNP   Cholecalciferol (VITAMIN D3) 125 MCG (5000 UT) TBDP Take 1 each by mouth daily Yes Antoniette Aliment, APRN - CNP   gabapentin (NEURONTIN) 300 MG capsule Take 1 capsule by mouth 3 times daily. For back pain, take all the time.  Yes Carol Plascencia MD        Social History     Tobacco Use    Smoking status: Former Smoker     Packs/day: 0.25     Years: 37.00     Pack years: 9.25     Types: Cigarettes     Quit date: 2012     Years since quittin.2    Smokeless tobacco: Never Used    Tobacco comment: smoke when nerves get bad   Substance Use Topics    Alcohol use: Not Currently     Comment: 2x a week        Vitals:    21 0947   BP: 122/80   Pulse: 73   SpO2: 99%   Weight: 154 lb (69.9 kg)     Estimated body mass index is 24.86 kg/m² as calculated from the following:    Height as of 3/15/21: 5' 6\" (1.676 m). Weight as of this encounter: 154 lb (69.9 kg). Physical Exam  Vitals reviewed. Constitutional:       Appearance: She is well-developed. HENT:      Head: Normocephalic. Right Ear: Hearing and external ear normal.      Left Ear: Hearing and external ear normal.      Nose: Nose normal.   Eyes:      General: Lids are normal.   Cardiovascular:      Rate and Rhythm: Normal rate and regular rhythm. Heart sounds: Normal heart sounds, S1 normal and S2 normal.   Pulmonary:      Effort: Pulmonary effort is normal.      Breath sounds: Normal breath sounds. Musculoskeletal:         General: Normal range of motion. Skin:     General: Skin is warm and dry. Findings: No rash. Neurological:      Mental Status: She is alert and oriented to person, place, and time. GCS: GCS eye subscore is 4. GCS verbal subscore is 5. GCS motor subscore is 6. Psychiatric:         Speech: Speech normal.         Behavior: Behavior normal. Behavior is cooperative. ASSESSMENT/PLAN:  1. Screen for colon cancer  -Has colonoscopy scheduled 6/4    2. Essential hypertension  -Controlled  -Continue current medications. - olmesartan-hydroCHLOROthiazide (BENICAR HCT) 40-12.5 MG per tablet; Take 1 tablet by mouth daily  Dispense: 90 tablet; Refill: 1    3. Sciatica, left side  -Continue current medications. 4. Neural foraminal stenosis of lumbar spine  -Continue current medications. 5. PUD (peptic ulcer disease)  -Continue current medications. - dexlansoprazole (DEXILANT) 60 MG CPDR delayed release capsule; Take 1 tablet daily  Dispense: 30 capsule; Refill: 3    6. Routine eye exam    - Amb External Referral To Ophthalmology    7. Menopause    - DEXA BONE DENSITY AXIAL SKELETON; Future      Return in about 3 months (around 8/18/2021) for HTN. Reviewed patient's pertinent medical history, relevant laboratory results, imaging studies, and health maintenance. Medications have been reviewed and discussed with the patient, refills otherwise up-to-date. Discussed the importance of adhering to current medication regimen. Advised:  (1) continue to work on eating a healthy balanced diet; (2) stay active by exercising within your personal limits. Patient was advised to keep future appointments with their respective specialty care team(s). Questions and concerns addressed, care plan reviewed and patient is agreeable with the care plan following today's visit.     --Rojas Gillis, VERONICA - CNP on 5/18/2021 at 10:42 AM

## 2021-05-19 LAB
ESTIMATED AVERAGE GLUCOSE: 105.4 MG/DL
HBA1C MFR BLD: 5.3 %

## 2021-05-26 ENCOUNTER — TELEPHONE (OUTPATIENT)
Dept: PRIMARY CARE CLINIC | Age: 61
End: 2021-05-26
Payer: COMMERCIAL

## 2021-05-26 DIAGNOSIS — Z12.11 COLON CANCER SCREENING: Primary | ICD-10-CM

## 2021-05-26 LAB
CONTROL: PRESENT
HEMOCCULT STL QL: NEGATIVE

## 2021-05-26 PROCEDURE — 82274 ASSAY TEST FOR BLOOD FECAL: CPT | Performed by: NURSE PRACTITIONER

## 2021-09-07 NOTE — PROGRESS NOTES
The symptoms are aggravated by certain foods. Pertinent negatives include no anemia, muscle weakness or orthopnea. She has tried a PPI for the symptoms. The treatment provided mild relief.        Toe Nail  Reports pain of right great toe and 2nd toe. Both nails are darkened, swelling of nailbed, discolored. Noticed 2 months ago. Back Pain   This is a chronic problem. The current episode started more than 1 year ago. The problem occurs constantly. The problem is unchanged. The pain is present in the lumbar spine. The quality of the pain is described as aching and burning. The pain is the same all the time. The symptoms are aggravated by bending, standing and twisting. Associated symptoms include leg pain, numbness, tingling and weakness. Pertinent negatives include no abdominal pain,  bladder incontinence, bowel incontinence, chest pain, dysuria, fever, headaches or pelvic pain. Treatments tried: gabapentin and she ambulates with 4 prong cane. She is without her cane today, states she misplaced it. Completed physical therapy and performs home exercises. Disability has been approved.     Review of Systems   Constitutional: Negative for activity change and fever. HENT: Negative for congestion. Eyes: Negative for visual disturbance. Respiratory: Negative for chest tightness, shortness of breath and wheezing. Cardiovascular: Negative for chest pain, palpitations and leg swelling. History of hypertension, managed on Olmesartan/HCTZ.  1/2021 Mammogram revealed 5mm mass right breast, recommend follow up in 6 months. Gastrointestinal: Negative for abdominal pain, constipation and diarrhea. GERD managed with Dexilant  Ventral hernia repair 12/2012   Endocrine: Negative for polyuria. Genitourinary: Negative for dysuria. Musculoskeletal: Negative for arthralgias, myalgias and neck pain. History of osteoarthritis of right knee  12/2020 MRI:   1.  Moderate to severe spinal canal stenosis at L4-L5  2. Multilevel neural foraminal narrowing as above. 3. Loss of disc space height with endplate irregularity  Modic type 1 degenerative endplate changes at S6-M8.     Trochanteric bursitis of the left and right hip and mild degenerative arthritis of left and right knee. Skin: Negative for rash. Allergic/Immunologic:        History of allergic rhinitis   Neurological: Negative for dizziness, light-headedness and headaches. History of headaches   Psychiatric/Behavioral: Negative for agitation, decreased concentration and sleep disturbance. The patient is not nervous/anxious.          History of depression          Objective    Past Medical History:   Diagnosis Date    Depression 2011    Headache(784.0) 6-7 months    pain in forehead    HTN (hypertension) 10/10/2011    Musculoskeletal back pain 1/10/2018    Primary osteoarthritis of right knee 1/10/2018    Sciatica of left side 2020     Past Surgical History:   Procedure Laterality Date   [email or web address]    COLONOSCOPY      problems with duodenum    HERNIA REPAIR      PAIN MANAGEMENT PROCEDURE Left 3/25/2021    LEFT L3, L4 TRANSFORAMINAL EPIDURAL STEROID INJECTION WITH FLUOROSCOPY (22909, 73870) performed by Lakshmi Arevalo MD at [place] [address]  2012    VENTRAL HERNIA REPAIR WITH MESH            Social History     Tobacco Use    Smoking status: Former Smoker     Packs/day: 0.25     Years: 37.00     Pack years: 9.25     Types: Cigarettes     Quit date: 2012     Years since quittin.5    Smokeless tobacco: Never Used    Tobacco comment: smoke when nerves get bad   Vaping Use    Vaping Use: Never used   Substance Use Topics    Alcohol use: Not Currently     Comment: 2x a week    Drug use: No     Family History   Problem Relation Age of Onset    Cancer Father     Diabetes Mother     High Blood Pressure Mother     Stroke Mother     height is 5' 6\" (1.676 m) and weight is 147 lb (66.7 kg). Her temporal temperature is 97.1 °F (36.2 °C). Her blood pressure is 132/76 and her pulse is 72. Her oxygen saturation is 98%. Physical Exam  Vitals reviewed. Constitutional:       Appearance: She is well-developed. HENT:      Head: Normocephalic. Right Ear: Hearing and external ear normal.      Left Ear: Hearing and external ear normal.      Nose: Nose normal.   Eyes:      General: Lids are normal.   Cardiovascular:      Rate and Rhythm: Normal rate and regular rhythm. Heart sounds: Normal heart sounds, S1 normal and S2 normal.   Pulmonary:      Effort: Pulmonary effort is normal.      Breath sounds: Normal breath sounds. Musculoskeletal:         General: Normal range of motion. Feet:    Skin:     General: Skin is warm and dry. Findings: No rash. Neurological:      Mental Status: She is alert and oriented to person, place, and time. GCS: GCS eye subscore is 4. GCS verbal subscore is 5. GCS motor subscore is 6. Psychiatric:         Speech: Speech normal.         Behavior: Behavior normal. Behavior is cooperative. On this date 9/8/2021 I have spent 20 minutes reviewing previous notes, test results and face to face with the patient discussing the diagnosis and importance of compliance with the treatment plan as well as documenting on the day of the visit. An electronic signature was used to authenticate this note.     --Alexandro Myles, VERONICA - CNP

## 2021-09-08 ENCOUNTER — OFFICE VISIT (OUTPATIENT)
Dept: PRIMARY CARE CLINIC | Age: 61
End: 2021-09-08
Payer: MEDICARE

## 2021-09-08 VITALS
BODY MASS INDEX: 23.63 KG/M2 | DIASTOLIC BLOOD PRESSURE: 76 MMHG | WEIGHT: 147 LBS | HEART RATE: 72 BPM | TEMPERATURE: 97.1 F | HEIGHT: 66 IN | SYSTOLIC BLOOD PRESSURE: 132 MMHG | OXYGEN SATURATION: 98 %

## 2021-09-08 DIAGNOSIS — E55.9 VITAMIN D DEFICIENCY: ICD-10-CM

## 2021-09-08 DIAGNOSIS — K27.9 PUD (PEPTIC ULCER DISEASE): ICD-10-CM

## 2021-09-08 DIAGNOSIS — I10 ESSENTIAL HYPERTENSION: ICD-10-CM

## 2021-09-08 DIAGNOSIS — B35.1 NAIL FUNGUS: Primary | ICD-10-CM

## 2021-09-08 PROCEDURE — G8420 CALC BMI NORM PARAMETERS: HCPCS | Performed by: NURSE PRACTITIONER

## 2021-09-08 PROCEDURE — 3017F COLORECTAL CA SCREEN DOC REV: CPT | Performed by: NURSE PRACTITIONER

## 2021-09-08 PROCEDURE — G8427 DOCREV CUR MEDS BY ELIG CLIN: HCPCS | Performed by: NURSE PRACTITIONER

## 2021-09-08 PROCEDURE — 99214 OFFICE O/P EST MOD 30 MIN: CPT | Performed by: NURSE PRACTITIONER

## 2021-09-08 PROCEDURE — 1036F TOBACCO NON-USER: CPT | Performed by: NURSE PRACTITIONER

## 2021-09-08 RX ORDER — DEXLANSOPRAZOLE 60 MG/1
CAPSULE, DELAYED RELEASE ORAL
Qty: 90 CAPSULE | Refills: 3 | Status: SHIPPED | OUTPATIENT
Start: 2021-09-08

## 2021-09-08 RX ORDER — OLMESARTAN MEDOXOMIL AND HYDROCHLOROTHIAZIDE 40/12.5 40; 12.5 MG/1; MG/1
1 TABLET ORAL DAILY
Qty: 90 TABLET | Refills: 3 | Status: SHIPPED | OUTPATIENT
Start: 2021-09-08

## 2021-09-08 ASSESSMENT — ENCOUNTER SYMPTOMS
CONSTIPATION: 0
CHEST TIGHTNESS: 0
ALLERGIC/IMMUNOLOGIC COMMENTS: HISTORY OF ALLERGIC RHINITIS
SHORTNESS OF BREATH: 0
DIARRHEA: 0
WHEEZING: 0
ABDOMINAL PAIN: 0

## 2021-09-08 NOTE — PATIENT INSTRUCTIONS
Patient Education        Recombinant Zoster (Shingles) Vaccine: What You Need to Know  Why get vaccinated? Recombinant zoster (shingles) vaccine can prevent shingles. Shingles (also called herpes zoster, or just zoster) is a painful skin rash, usually with blisters. In addition to the rash, shingles can cause fever, headache, chills, or upset stomach. More rarely, shingles can lead to pneumonia, hearing problems, blindness, brain inflammation (encephalitis), or death. The most common complication of shingles is long-term nerve pain called postherpetic neuralgia (PHN). PHN occurs in the areas where the shingles rash was, even after the rash clears up. It can last for months or years after the rash goes away. The pain from PHN can be severe and debilitating. About 10 to 18% of people who get shingles will experience PHN. The risk of PHN increases with age. An older adult with shingles is more likely to develop PHN and have longer lasting and more severe pain than a younger person with shingles. Shingles is caused by the varicella zoster virus, the same virus that causes chickenpox. After you have chickenpox, the virus stays in your body and can cause shingles later in life. Shingles cannot be passed from one person to another, but the virus that causes shingles can spread and cause chickenpox in someone who had never had chickenpox or received chickenpox vaccine. Recombinant shingles vaccine  Recombinant shingles vaccine provides strong protection against shingles. By preventing shingles, recombinant shingles vaccine also protects against PHN. Recombinant shingles vaccine is the preferred vaccine for the prevention of shingles. However, a different vaccine, live shingles vaccine, may be used in some circumstances. The recombinant shingles vaccine is recommended for adults 50 years and older without serious immune problems. It is given as a two-dose series.   This vaccine is also recommended for people who reaction could occur after the vaccinated person leaves the clinic. If you see signs of a severe allergic reaction (hives, swelling of the face and throat, difficulty breathing, a fast heartbeat, dizziness, or weakness), call 9-1-1 and get the person to the nearest hospital.  For other signs that concern you, call your health care provider. Adverse reactions should be reported to the Vaccine Adverse Event Reporting System (VAERS). Your health care provider will usually file this report, or you can do it yourself. Visit the VAERS website at www.vaers. [facility].gov or call 1-307.988.6131. VAERS is only for reporting reactions, and VAERS staff do not give medical advice. How can I learn more? · Ask your health care provider. · Call your local or state health department. · Contact the Centers for Disease Control and Prevention (CDC):  ? Call 9-716.169.8327 (1-800-CDC-INFO) or  ? Visit CDC's website at www.cdc.gov/vaccines  Vaccine Information Statement  Recombinant Zoster Vaccine  10/30/2019  [facility] and [facility] for Disease Control and Prevention  Many Vaccine Information Statements are available in [place] and other languages. See www.immunize.org/vis. Hojas de Información Sobre Vacunas están disponibles en Español y en muchos otros idiomas. Visite Orestes.si   Care instructions adapted under license by [facility] ([facility]). If you have questions about a medical condition or this instruction, always ask your healthcare professional. [place] [address] any warranty or liability for your use of this information. Patient Education        Influenza (Flu) Vaccine: Care Instructions  Your Care Instructions     Influenza (flu) is an infection in the lungs and breathing passages. It is caused by the influenza virus. There are different strains, or types, of the flu virus every year. The flu comes on quickly.  It can cause a cough, stuffy nose, fever, chills, muscle disorders). · People who can give the flu to others who are at high risk for problems from the flu. This includes all health care workers and close contacts of people age 72 or older. Who should not get the flu vaccine? The person who gives the vaccine may tell you not to get it if you:  · Have a severe allergy to eggs or any part of the vaccine. · Have had a severe reaction to a flu vaccine in the past.  · Have had Guillain-Barré syndrome (GBS). · Are sick with a fever. (Get the vaccine when symptoms are gone.)  How can you care for yourself at home? · If you or your child has a sore arm or a slight fever after the vaccine, take an over-the-counter pain medicine, such as acetaminophen (Tylenol) or ibuprofen (Advil, Motrin). Read and follow all instructions on the label. Do not give aspirin to anyone younger than 20. It has been linked to Reye syndrome, a serious illness. · Do not take two or more pain medicines at the same time unless the doctor told you to. Many pain medicines have acetaminophen, which is Tylenol. Too much acetaminophen (Tylenol) can be harmful. When should you call for help? Call 911 anytime you think you may need emergency care. For example, call if after getting the flu vaccine:    · You have symptoms of a severe reaction to the flu vaccine. Symptoms of a severe reaction may include:  ? Severe difficulty breathing. ? Sudden raised, red areas (hives) all over your body. ? Severe lightheadedness. Call your doctor now or seek immediate medical care if after getting the flu vaccine:    · You think you are having a reaction to the flu vaccine, such as a new fever. Watch closely for changes in your health, and be sure to contact your doctor if you have any problems. Where can you learn more? Go to https://chpeconchitaeweb.[facility]. org and sign in to your [employer] account.  Enter M896 in the [employer] box to learn more about \"Influenza (Flu) Vaccine: Care

## 2021-09-09 ENCOUNTER — OFFICE VISIT (OUTPATIENT)
Dept: ORTHOPEDIC SURGERY | Age: 61
End: 2021-09-09
Payer: COMMERCIAL

## 2021-09-09 VITALS — HEIGHT: 66 IN | WEIGHT: 145 LBS | BODY MASS INDEX: 23.3 KG/M2

## 2021-09-09 DIAGNOSIS — G89.29 CHRONIC MIDLINE LOW BACK PAIN, UNSPECIFIED WHETHER SCIATICA PRESENT: Primary | ICD-10-CM

## 2021-09-09 DIAGNOSIS — M54.50 CHRONIC MIDLINE LOW BACK PAIN, UNSPECIFIED WHETHER SCIATICA PRESENT: Primary | ICD-10-CM

## 2021-09-09 DIAGNOSIS — M48.061 SPINAL STENOSIS OF LUMBAR REGION WITHOUT NEUROGENIC CLAUDICATION: ICD-10-CM

## 2021-09-09 PROCEDURE — 3017F COLORECTAL CA SCREEN DOC REV: CPT | Performed by: PHYSICIAN ASSISTANT

## 2021-09-09 PROCEDURE — 99214 OFFICE O/P EST MOD 30 MIN: CPT | Performed by: PHYSICIAN ASSISTANT

## 2021-09-09 PROCEDURE — G8420 CALC BMI NORM PARAMETERS: HCPCS | Performed by: PHYSICIAN ASSISTANT

## 2021-09-09 PROCEDURE — G8427 DOCREV CUR MEDS BY ELIG CLIN: HCPCS | Performed by: PHYSICIAN ASSISTANT

## 2021-09-09 PROCEDURE — 1036F TOBACCO NON-USER: CPT | Performed by: PHYSICIAN ASSISTANT

## 2021-09-09 NOTE — PROGRESS NOTES
History of present illness:   Ms. Iraida Champagne is a pleasant 63 y.o. female with a PMH of lumbar spondylolisthesis, primary osteoarthritis of the knee, gastroesophageal reflux disease with esophagitis, essential hypertension kindly referred by ISAAC Cervantes for consultation regarding her LBP and right leg pain. She had been previously under the care of Jt Barrett PA-C for back pain. She has had a previous MRI of the lumbar spine. She states her pain began 1 year ago. Her pain has steadily worsened since onset. She rates her back pain 8/10 and leg pain 8/10. She describes the pain as constant dull ache throbbing pain. The leg pain radiates down the posterior aspect of her leg to her right. She reports numbness and tingling across her lower back. She reports weakness of her posterior right leg. She denies bowel or bladder dysfunction and saddle anesthesia. She states she can sit for a maximum of 30 minutes and stand for a maximum 60 minutes. The pain occasionally disrupts her sleep. She has tried anti-inflammatory medications and physical therapy with mild improvement. Past medical history:  Her past medical history has been reviewed. Past Medical History:   Diagnosis Date    Depression 2011    Headache(784.0) 6-7 months    pain in forehead    HTN (hypertension) 10/10/2011    Musculoskeletal back pain 1/10/2018    Primary osteoarthritis of right knee 1/10/2018    Sciatica of left side 2020       Her past surgical history has been reviewed.   Past Surgical History:   Procedure Laterality Date     SECTION  ,     COLONOSCOPY      problems with duodenum    HERNIA REPAIR      PAIN MANAGEMENT PROCEDURE Left 3/25/2021    LEFT L3, L4 TRANSFORAMINAL EPIDURAL STEROID INJECTION WITH FLUOROSCOPY (05537, [id number]) performed by Lulu Burks MD at [place] [address]  2012    VENTRAL HERNIA REPAIR WITH MESH                Her medications and allergies were reviewed. Current Outpatient Medications   Medication Sig Dispense Refill    Cholecalciferol (VITAMIN D3) 125 MCG (5000 UT) TBDP Take 1 each by mouth daily 30 tablet 5    olmesartan-hydroCHLOROthiazide (BENICAR HCT) 40-12.5 MG per tablet Take 1 tablet by mouth daily 90 tablet 3    dexlansoprazole (DEXILANT) 60 MG CPDR delayed release capsule Take 1 tablet daily 90 capsule 3    gabapentin (NEURONTIN) 300 MG capsule Take 1 capsule by mouth 3 times daily. For back pain, take all the time. 90 capsule 5     No current facility-administered medications for this visit. Her social history has been reviewed. Social History     Occupational History    Not on file   Tobacco Use    Smoking status: Former Smoker     Packs/day: 0.25     Years: 37.00     Pack years: 9.25     Types: Cigarettes     Quit date: 2012     Years since quittin.5    Smokeless tobacco: Never Used    Tobacco comment: smoke when nerves get bad   Vaping Use    Vaping Use: Never used   Substance and Sexual Activity    Alcohol use: Not Currently     Comment: 2x a week    Drug use: No    Sexual activity: Yes     Partners: Male         Her family history has been reviewed. Family History   Problem Relation Age of Onset    Cancer Father     Diabetes Mother     High Blood Pressure Mother     Stroke Mother          Review of symptoms:  Review of Systems:  I have reviewed the clinically relevant past medical history, medications, allergies, family history, social history, and 13 point Review of Systems from the patient's recent history form & documented any details relevant to today's presenting complaints in the history above. The patient's self-reported past medical history, medications, allergies, family history, social history, and Review of Systems form from today's date have been scanned into the chart under the \"Media\" tab.       Patient's review of symptoms was reviewed and is significant for back pain and negative for recent weight loss, fatigue, chills, visual disturbances, blood in stool or urine, recent infection. Physical examination:  Ms. Lakshmi Marin most recent vitals:  Vitals  Height: 5' 6\" (167.6 cm)  Weight: 145 lb (65.8 kg)  Body mass index is 23.4 kg/m². General exam:  She is well-developed and well-nourished, is in obvious discomfort and alert and oriented to person, place, and time. She demonstrates appropriate mood and affect. Her skin is warm and dry. Her gait is normal and she walks heel to toe without significant limp or instability. Back:  She stands with slight lumbar flexion. Her lumbar flexion, extension and lateral bending are moderately reduced with pain. She has mild tenderness over her lumbar spine without obvious muscle spasm. The skin over her lumbar spine is normal without a surgical scar. Lower extremities:  She has 5/5 motor strength of bilateral lower extremities. She has a negative straight leg raise, bilaterally. Deep tendon reflexes at knees and achilles are 2+. Sensation is intact to light touch L3 to S1 bilaterally. She has no clonus. Hip range of motion is normal and pain-free  Stinchfield test is negative. Abdomen:  Non-tender and non-distended. No rash. Imaging:  X rays was obtained in the office today. AP and lateral lumbar spine demonstrates degenerative disc disease and facet arthritis at the L4-L5 level. MRI: Obtained from [facility] or an outside facility.   Narrative   EXAMINATION:   MRI OF THE LUMBAR SPINE WITHOUT CONTRAST, 12/21/2020 9:55 am       TECHNIQUE:   Multiplanar multisequence MRI of the lumbar spine was performed without the   administration of intravenous contrast.       COMPARISON:   None.       HISTORY:   ORDERING SYSTEM PROVIDED HISTORY: HNP (herniated nucleus pulposus), lumbar   TECHNOLOGIST PROVIDED HISTORY:   Reason for Exam: Low back pain   Acuity: Chronic Type of Exam: Initial       FINDINGS:   BONES/ALIGNMENT: The vertebral body heights appear maintained.  There is   slight straightening of the normal lumbar lordosis without evidence of   spondylolisthesis.  Loss of disc space height with endplate irregularity as   well as moderate Modic type 1 degenerative endplate changes at T7-P5. Minimal Modic type 2 degenerative endplate changes at P6-M9.  The bone marrow   signal otherwise demonstrates no acute abnormality.       SPINAL CORD: The conus terminates normally.       SOFT TISSUES: No paraspinal mass identified.       L1-L2: There is no significant disc bulge, spinal canal stenosis or neural   foraminal narrowing.       L2-L3: There is a disc bulge contacting the ventral thecal sac.  No   significant spinal canal stenosis.  Facet arthrosis contributes to minimal   bilateral neural foraminal narrowing.       L3-L4: There is a disc bulge with buckling of the ligamentum flavum and facet   arthrosis contributing to minimal spinal canal stenosis.  Mild bilateral   neural foraminal narrowing.       L4-L5: There is a disc bulge with a central disc protrusion along with   bilateral facet arthrosis.  Moderate to severe spinal canal stenosis. Mild-to-moderate right and moderate left neural foraminal narrowing.       L5-S1: There is a disc bulge with bilateral facet arthrosis contributing to   moderate right and mild left neural foraminal narrowing.  No significant   spinal canal stenosis.         Impression   1. Moderate to severe spinal canal stenosis at L4-L5 with minimal spinal   canal stenosis at L3-L4. 2. Multilevel neural foraminal narrowing as above. 3. Loss of disc space height with endplate irregularity as well as moderate   Modic type 1 degenerative endplate changes at Q4-G6. Diagnosis:      ICD-10-CM    1.  Chronic midline low back pain, unspecified whether sciatica present  M54.5 XR LUMBAR SPINE (2-3 VIEWS)    G89.29 Ambulatory referral to Physical Therapy   2. Spinal stenosis of lumbar region without neurogenic claudication  M48.061 Ambulatory referral to Physical Therapy          Assessment/ Plan:  Lumbar spondylosis. MRI 12/2020 shows moderate to severe spinal canal stenosis at L4-5 and minimal spinal canal stenosis at L3-4. Multilevel neuroforaminal stenosis noted. She remains neurologically intact in both lower extremities. I had an extensive discussion with Ms. Ngoc Munson and/or family regarding the natural history, etiology, and long term consequences of her condition. I have presented reasonable alternatives to the patient's proposed care, treatment, and services. Risks and benefits of the treatment options also reviewed in detail. I have outlined a treatment plan with them. She has had full opportunity to ask her questions. I have answered them all to her satisfaction. I feel that Ms. Ngoc Munson understands our discussion today     New Medications prescribed today-no new medications. OTC NSAIDS discussed. She  was advised that NSAID-type medications have several important potential side effects: gastrointestinal irritation including hemorrhage, cardiac events and renal injuries. She was asked to take the medication with food and to stop if she experiences any GI upset. I asked her to call for vomiting, abdominal pain or black/bloody stools. She should have renal function testing per his medical provider periodically. She expresses understanding of these risks associated with NSAID use and questions were answered. PT-reports excellent improvement with previous physical therapy. PT Rx provided today. Further Imaging-not indicated at this time. Procedures-discussed possible epidural steroid injections. She would prefer not to go that route. Follow up-6 weeks.     She was instructed to call us emergently if she begins to experience bowel or bladder dysfunction, saddle anesthesia, increasing muscle weakness, or onset/ worsening leg symptoms. Marc Dobson PA-C   Senior Physician Assistant   [facility]                                         Disclaimer: This note was generated with use of a verbal recognition program (DRAGON) and an attempt was made to check for errors. It is possible that there are still dictated errors within this office note. If so, please bring any significant errors to my attention for an addendum. All efforts were made to ensure that this office note is accurate.

## 2021-10-21 ENCOUNTER — OFFICE VISIT (OUTPATIENT)
Dept: ORTHOPEDIC SURGERY | Age: 61
End: 2021-10-21
Payer: COMMERCIAL

## 2021-10-21 VITALS — HEIGHT: 66 IN | WEIGHT: 145 LBS | BODY MASS INDEX: 23.3 KG/M2 | RESPIRATION RATE: 18 BRPM

## 2021-10-21 DIAGNOSIS — M48.061 SPINAL STENOSIS OF LUMBAR REGION WITHOUT NEUROGENIC CLAUDICATION: Primary | ICD-10-CM

## 2021-10-21 PROCEDURE — G8484 FLU IMMUNIZE NO ADMIN: HCPCS | Performed by: PHYSICIAN ASSISTANT

## 2021-10-21 PROCEDURE — 3017F COLORECTAL CA SCREEN DOC REV: CPT | Performed by: PHYSICIAN ASSISTANT

## 2021-10-21 PROCEDURE — 1036F TOBACCO NON-USER: CPT | Performed by: PHYSICIAN ASSISTANT

## 2021-10-21 PROCEDURE — 99214 OFFICE O/P EST MOD 30 MIN: CPT | Performed by: PHYSICIAN ASSISTANT

## 2021-10-21 PROCEDURE — G8420 CALC BMI NORM PARAMETERS: HCPCS | Performed by: PHYSICIAN ASSISTANT

## 2021-10-21 PROCEDURE — G8427 DOCREV CUR MEDS BY ELIG CLIN: HCPCS | Performed by: PHYSICIAN ASSISTANT

## 2021-10-21 NOTE — LETTER
[facility]  [facility] [place] [address] [address] [address]  Phone: [phone]  Fax: Lucio Aviles, [address]        October 21, 2021     Patient: Jordin Norman   YOB: 1960   Date of Visit: 10/21/2021       To Whom It May Concern: It is my medical opinion that Katya Ashraf is unable to perform jury duty at this time . If you have any questions or concerns, please don't hesitate to call.     Sincerely,          KEVIN Hamilton

## 2021-10-22 NOTE — PROGRESS NOTES
Subjective:      Patient ID: Rahul Stark is a 63 y.o. female who is here for follow up evaluation of low back pain and leg pain due to lumbar canal and foraminal stenosis. She states she is getting some relief with physical therapy but still has significant low back pain and leg pain. She states her pain began 1 year ago. Her pain has steadily worsened since onset. She rates her back pain 8/10 and leg pain 8/10. She describes the pain as constant dull ache throbbing pain. The leg pain radiates down the posterior aspect of her leg to her right. She reports numbness and tingling across her lower back. She reports weakness of her posterior right leg. She denies bowel or bladder dysfunction and saddle anesthesia. She states she can sit for a maximum of 30 minutes and stand for a maximum 60 minutes. The pain occasionally disrupts her sleep. She has tried anti-inflammatory medications and physical therapy with mild improvement. Review Of Systems:   I have reviewed the clinically relevant past medical history, medications, allergies, family history, social history, and 13 point Review of Systems from the patient's recent history form & documented any details relevant to today's presenting complaints in the history above. The patient's self-reported past medical history, medications, allergies, family history, social history, and Review of Systems form from  9/9/2021 date have been scanned into the chart under the \"Media\" tab.       Past Medical History:   Diagnosis Date    Depression 12/17/2011    Headache(784.0) 6-7 months    pain in forehead    HTN (hypertension) 10/10/2011    Musculoskeletal back pain 1/10/2018    Primary osteoarthritis of right knee 1/10/2018    Sciatica of left side 8/27/2020       Family History   Problem Relation Age of Onset    Cancer Father     Diabetes Mother     High Blood Pressure Mother     Stroke Mother        Past Surgical History:   Procedure Laterality Date     SECTION  ,     COLONOSCOPY      problems with duodenum    HERNIA REPAIR      PAIN MANAGEMENT PROCEDURE Left 3/25/2021    LEFT L3, L4 TRANSFORAMINAL EPIDURAL STEROID INJECTION WITH FLUOROSCOPY ([id number], [id number]) performed by Mando Rdoas MD at [place] [address]  2012    VENTRAL HERNIA REPAIR WITH MESH              Social History     Occupational History    Not on file   Tobacco Use    Smoking status: Former Smoker     Packs/day: 0.25     Years: 37.00     Pack years: 9.25     Types: Cigarettes     Quit date: 2012     Years since quittin.6    Smokeless tobacco: Never Used    Tobacco comment: smoke when nerves get bad   Vaping Use    Vaping Use: Never used   Substance and Sexual Activity    Alcohol use: Not Currently     Comment: 2x a week    Drug use: No    Sexual activity: Yes     Partners: Male       Current Outpatient Medications   Medication Sig Dispense Refill    Cholecalciferol (VITAMIN D3) 125 MCG (5000 UT) TBDP Take 1 each by mouth daily 30 tablet 5    olmesartan-hydroCHLOROthiazide (BENICAR HCT) 40-12.5 MG per tablet Take 1 tablet by mouth daily 90 tablet 3    dexlansoprazole (DEXILANT) 60 MG CPDR delayed release capsule Take 1 tablet daily 90 capsule 3    gabapentin (NEURONTIN) 300 MG capsule Take 1 capsule by mouth 3 times daily. For back pain, take all the time. 90 capsule 5     No current facility-administered medications for this visit. Objective:     She is alert, oriented x 3, pleasant, well nourished, developed and in no   acute distress. Resp 18   Ht 5' 6\" (1.676 m)   Wt 145 lb (65.8 kg)   BMI 23.40 kg/m²      Her gait is normal and she walks heel to toe without significant limp or instability.      Back:  She stands with slight lumbar flexion. Her lumbar flexion, extension and lateral bending are moderately reduced with pain.    She has mild tenderness over her lumbar spine without obvious muscle spasm. The skin over her lumbar spine is normal without a surgical scar.      Lower extremities:  She has 5/5 motor strength of bilateral lower extremities. She has a negative straight leg raise, bilaterally. Deep tendon reflexes at knees and achilles are 2+. Sensation is intact to light touch L3 to S1 bilaterally. She has no clonus.      Hip range of motion is normal and pain-free  Stinchfield test is negative.     Abdomen:  Non-tender and non-distended. No rash.         X Rays: not performed in the office today:     MRI: Obtained from [address] [place] [address] or an outside facility. EXAMINATION:   MRI OF THE LUMBAR SPINE WITHOUT CONTRAST, 12/21/2020 9:55 am       TECHNIQUE:   Multiplanar multisequence MRI of the lumbar spine was performed without the   administration of intravenous contrast.       COMPARISON:   None.       HISTORY:   ORDERING SYSTEM PROVIDED HISTORY: HNP (herniated nucleus pulposus), lumbar   TECHNOLOGIST PROVIDED HISTORY:   Reason for Exam: Low back pain   Acuity: Chronic   Type of Exam: Initial       FINDINGS:   BONES/ALIGNMENT: The vertebral body heights appear maintained.  There is   slight straightening of the normal lumbar lordosis without evidence of   spondylolisthesis.  Loss of disc space height with endplate irregularity as   well as moderate Modic type 1 degenerative endplate changes at N4-U4.    Minimal Modic type 2 degenerative endplate changes at W3-F3.  The bone marrow   signal otherwise demonstrates no acute abnormality.       SPINAL CORD: The conus terminates normally.       SOFT TISSUES: No paraspinal mass identified.       L1-L2: There is no significant disc bulge, spinal canal stenosis or neural   foraminal narrowing.       L2-L3: There is a disc bulge contacting the ventral thecal sac.  No   significant spinal canal stenosis.  Facet arthrosis contributes to minimal   bilateral neural foraminal narrowing.       L3-L4: There is a disc bulge with buckling of the ligamentum flavum and facet   arthrosis contributing to minimal spinal canal stenosis.  Mild bilateral   neural foraminal narrowing.       L4-L5: There is a disc bulge with a central disc protrusion along with   bilateral facet arthrosis.  Moderate to severe spinal canal stenosis. Mild-to-moderate right and moderate left neural foraminal narrowing.       L5-S1: There is a disc bulge with bilateral facet arthrosis contributing to   moderate right and mild left neural foraminal narrowing.  No significant   spinal canal stenosis.         Impression   1. Moderate to severe spinal canal stenosis at L4-L5 with minimal spinal   canal stenosis at L3-L4. 2. Multilevel neural foraminal narrowing as above. 3. Loss of disc space height with endplate irregularity as well as moderate   Modic type 1 degenerative endplate changes at H3-U2.          Diagnosis:       ICD-10-CM    1. Spinal stenosis of lumbar region without neurogenic claudication  M48.061 LISA - Alyssa Clancy MD, Physical Medicine and Rehabilitation, [facility]        Assessment and Plan:       Assessment:  Low back pain and leg pain related to moderate to severe spinal canal stenosis L4-L5 and multilevel neuroforaminal narrowing. I had an extensive discussion with Ms. Bob Banks regarding the natural history, etiology, and long term consequences of her condition. I have presented reasonable alternatives to the patient's proposed care, treatment, and services. Risks and benefits of the treatment options also reviewed in detail. I have outlined a treatment plan with them. She has had full opportunity to ask her questions. I have answered them all to her satisfaction. I feel that Ms. Bob Banks understands our discussion today. Plan:    Procedures-     At this time, I do not believe spinal surgery is indicated.  She may benefit other therapeutic options such as epidural steroid injection, facet injection or other interventional procedures. For this reason, I am going to refer to Dr. Manju Mattson for Interventional Physiatry Consultation for an evaluation and treatment. Follow up:   Call or return to clinic if these symptoms worsen or fail to improve as anticipated. Viry Lin PA-C   Senior Physician Assistant   [facility]                                         Disclaimer: This note was generated with use of a verbal recognition program (DRAGON) and an attempt was made to check for errors. It is possible that there are still dictated errors within this office note. If so, please bring any significant errors to my attention for an addendum. All efforts were made to ensure that this office note is accurate.

## 2021-11-05 ENCOUNTER — HOSPITAL ENCOUNTER (OUTPATIENT)
Dept: PHYSICAL THERAPY | Age: 61
Setting detail: THERAPIES SERIES
Discharge: HOME | End: 2021-11-05
Payer: COMMERCIAL

## 2021-11-05 ENCOUNTER — APPOINTMENT (OUTPATIENT)
Dept: PHYSICAL THERAPY | Age: 61
End: 2021-11-05
Payer: COMMERCIAL

## 2021-11-05 PROCEDURE — 97110 THERAPEUTIC EXERCISES: CPT

## 2021-11-05 PROCEDURE — 97161 PT EVAL LOW COMPLEX 20 MIN: CPT

## 2021-11-05 NOTE — PLAN OF CARE
[facility]. [address]  Phone: [phone]   Fax:     [phone]                                                       Physical Therapy Certification    Dear Referring Practitioner: Viry Lin,    We had the pleasure of evaluating the following patient for physical therapy services at [facility]. A summary of our findings can be found in the initial assessment below. This includes our plan of care. If you have any questions or concerns regarding these findings, please do not hesitate to contact me at the office phone number checked above. Thank you for the referral.       Physician Signature:_______________________________Date:__________________  By signing above (or electronic signature), therapists plan is approved by physician        Patient: Stephanie Clinton   : 1960   MRN: [id number]  Referring Physician: Referring Practitioner: Viry Lin      Evaluation Date: 2021        Medical Diagnosis Information: M48.061 (ICD-10-CM) - Spinal stenosis of lumbar region without neurogenic claudication   Treatment Diagnosis: Decreased strength and mobility                                         Insurance information:  [employer]     Precautions/ Contra-indications: None   Latex Allergy:  [x]NO      []YES  Preferred Language for Healthcare:   [x]English       []other:    C-SSRS Triggered by Intake questionnaire (Past 2 wk assessment ):   [x] No, Questionnaire did not trigger screening.   [] Yes, Patient intake triggered C-SSRS Screening      [] C-SSRS Screening completed  [] PCP notified via Epic     SUBJECTIVE: Patient reports LBP that is chronic. Pain located in lumbar region across belt line on both sides. Pain in R leg that travels down to the foot. Reports N/T.  No falls recently, but feels uneasy on feet requiring assistance (I.e. furniture walking). Pt utilizes a single point cane. Pt reports she had PT in the past for her back and it helped significantly. Relevant Medical History: HTN  Functional Outcome Measure: [place] = 80    Pain Scale: 8/10  Easing factors: stretching, heat,   Provocative factors: sitting in chair, sleeping, standing    Type: []Constant   [x]Intermittent  []Radiating []Localized []other:     Numbness/Tingling: She reports N/T in R LE. Occupation/School: Currently not working. Disability. Living Status/Prior Level of Function: Independent with ADLs and IADLs. OBJECTIVE:   Posture: Standing: increased trunk flexion, Sitting: slouched posture, rounded shoulders     Functional Mobility/Transfers: requires bilateral UE assist to transfer from sit > stand and supine > sit. Palpation: Gr II TTP to BL lumbar paraspinals.       Gait: ambulates with a SPC, decreased rosana, decreased stride length    Bandages/Dressings/Incisions: NA    Repeated Movements:unable to assess secondary to pain    ROM  Comments   Lumbar Flex 90% pain   Lumbar Ext 25% pressure     ROM LEFT RIGHT Comments   Lumbar Side Bend 75% 75% Increased forward flexion   Lumbar Rotation 50% 50% Discomfort    Hip Flexion  deg LBP on R   Hip ER Mild limitations Mild limitations    Hip IR Moderate limitations Moderate limitations    Knee Ext WNL WNL    Knee Flex WNL WNL    Hamstring Flex Mild tightness Mild tightness    Piriformis Moderate tightness Moderate tightness Pain on R      Myotomes/Strength Left Right Comments   []ALL NORMAL      Hip Abd 3+/5  3+/5 pain   Hip Ext 3+/5  3+/5    Hip flexion (L1-L2 femoral) 4/5 4/5    Knee extension (L2-L4 femoral) 5/5 5/5    Knee flexion (S1 sciatic) 4-/5 4-/5    Dorsiflexion (L4-L5 deep peroneal) 5/5 5/5    Ankle PF(S1-S2 tibial) 5/5 5/5      Dermatomes Normal Abnormal Comments   [x]ALL NORMAL            inguinal area (L1)  [] []    anterior mid-thigh (L2) [] []    distal ant thigh/med knee (L3) [] []    medial lower leg and foot (L4) [] []    lateral lower leg and foot (L5) [] []    posterior calf (S1) [] []    medial calcaneus (S2) [] []      Reflexes Normal Abnormal Comments   [x]ALL NORMAL            S1-2 Seated achilles [x] []    L3-4 Patellar tendon [x] []      Joint mobility:    [x]Normal    []Hypo   []Hyper    Orthopedic Special Tests:   Neural dynamic tension testing Normal Abnormal Comments   Slump Test  - Degree of knee flexion:  [x] []    SLR  [] []    0-30 [] []    30-70 [x] [] Discomfort on R    Femoral nerve (L2-4) [x] []       Normal Abnormal N/A Comments   Fwd Bend-aberrant or innominate mvmt) [] [x] [] Aberrant mvmt upon return   Trendelenburg [] [] []    Kemps/Quadrant [] [] []    Mardel Ritesh [] [] []    ZACH/Ian [] [] []    Hip scour [] [] []    Supine to sit [] [] []    Prone knee bend [] [] []           Hip thrust [] [] []    SI distraction/compression [] [] []    Sacral Spring/thrust [] [] []               [x] Patient history, allergies, meds reviewed. Medical chart reviewed. See intake form. Review Of Systems (ROS):  [x]Performed Review of systems (Integumentary, CardioPulmonary, Neurological) by intake and observation. Intake form has been scanned into medical record. Patient has been instructed to contact their primary care physician regarding ROS issues if not already being addressed at this time.       Co-morbidities/Complexities (which will affect course of rehabilitation):   []None           Arthritic conditions   []Rheumatoid arthritis (M05.9)  []Osteoarthritis (M19.91)   Cardiovascular conditions   [x]Hypertension (I10)  []Hyperlipidemia (E78.5)  []Angina pectoris (I20)  []Atherosclerosis (I70)  []CVA Musculoskeletal conditions   []Disc pathology   []Congenital spine pathologies   []Prior surgical intervention  []Osteoporosis (M81.8)  []Osteopenia (M85.8)   Endocrine conditions   []Hypothyroid (E03.9)  []Hyperthyroid Gastrointestinal conditions   []Constipation (X33.04)   Metabolic conditions   []Morbid obesity (E66.01)  []Diabetes type 1(E10.65) or 2 (E11.65)   []Neuropathy (G60.9)     Pulmonary conditions   []Asthma (J45)  []Coughing   []COPD (J44.9)   Psychological Disorders  []Anxiety (F41.9)  []Depression (F32.9)   []Other:   []Other:           Barriers to/and or personal factors that will affect rehab potential:              []Age  []Sex    []Smoker              []Motivation/Lack of Motivation                        [x]Co-Morbidities              []Cognitive Function, education/learning barriers              []Environmental, home barriers              []profession/work barriers  []past PT/medical experience  []other:  Justification:     Falls Risk Assessment (30 days):   [x] Falls Risk assessed and no intervention required. [] Falls Risk assessed and Patient requires intervention due to being higher risk   TUG score (>12s at risk):     [] Falls education provided, including:         ASSESSMENT: Pt is a 62 yo female who presents to PT with LBP. Upon IE, pt demonstrates negative special testing for disc/nerve involvement. Pt displays good tolerance to PT IE. Pt displays mild decreased lumbar ROM, TTP, pain, decreased flexibility, gait/balance impairments, and strength deficits. Pt would benefit from PT 1-2x/week for 4 weeks in order to address the impairments listed.    Functional Impairments:     []Noted lumbar/proximal hip hypomobility   []Noted lumbosacral and/or generalized hypermobility   [x]Decreased Lumbosacral/hip/LE functional ROM   [x]Decreased core/proximal hip strength and neuromuscular control    [x]Decreased LE functional strength    []Abnormal reflexes/sensation/myotomal/dermatomal deficits  [x]Reduced balance/proprioceptive control    []other:      Functional Activity Limitations (from functional questionnaire and intake)   [x]Reduced ability to tolerate prolonged functional positions   [x]Reduced ability or difficulty with changes of positions or transfers between positions   [x]Reduced ability to maintain good posture and demonstrate good body mechanics with sitting, bending, and lifting   [x]Reduced ability to sleep   [] Reduced ability or tolerance with driving and/or computer work   [x]Reduced ability to perform lifting, reaching, carrying tasks   []Reduced ability to squat   []Reduced ability to forward bend   [x]Reduced ability to ambulate prolonged functional periods/distances/surfaces   []Reduced ability to ascend/descend stairs   []other:       Participation Restrictions   []Reduced participation in self care activities   [x]Reduced participation in home management activities   []Reduced participation in work activities   [x]Reduced participation in social activities. [x]Reduced participation in sport/recreational activities. Classification:   [x]Signs/symptoms consistent with Lumbar instability/stabilization subgroup. - pt would benefit from strengthening/enduranced past exercises. []Signs/symptoms consistent with Lumbar mobilization/manipulation subgroup, myotomes and dermatomes intact. Meets manipulation criteria. []Signs/symptoms consistent with Lumbar direction specific/centralization subgroup   []Signs/symptoms consistent with Lumbar traction subgroup       [x]Signs/symptoms consistent with lumbar facet dysfunction   []Signs/symptoms consistent with lumbar stenosis type dysfunction   []Signs/symptoms consistent with nerve root involvement including myotome & dermatome dysfunction   []Signs/symptoms consistent with post-surgical status including: decreased ROM, strength and function.    []signs/symptoms consistent with pathology which may benefit from Dry needling     []other:      Prognosis/Rehab Potential:      []Excellent   [x]Good    []Fair   []Poor    Tolerance of evaluation/treatment:    []Excellent   [x]Good    []Fair   []Poor     Physical Therapy Evaluation Complexity Justification  [x] A history of present problem with:  [] no personal factors and/or comorbidities that impact the plan of care;  [x]1-2 personal factors and/or comorbidities that impact the plan of care  []3 personal factors and/or comorbidities that impact the plan of care  [x] An examination of body systems using standardized tests and measures addressing any of the following: body structures and functions (impairments), activity limitations, and/or participation restrictions;:  [x] a total of 1-2 or more elements   [] a total of 3 or more elements   [] a total of 4 or more elements   [x] A clinical presentation with:  [x] stable and/or uncomplicated characteristics   [] evolving clinical presentation with changing characteristics  [] unstable and unpredictable characteristics;   [x] Clinical decision making of [x] low, [] moderate, [] high complexity using standardized patient assessment instrument and/or measurable assessment of functional outcome. [x] EVAL (LOW) 41662 (typically 20 minutes face-to-face)  [] EVAL (MOD) 90895 (typically 30 minutes face-to-face)  [] EVAL (HIGH) 46729 (typically 45 minutes face-to-face)  [] RE-EVAL     PLAN: Begin PT focusing on: proximal hip mobilizations, LB mobs, LB core activation, proximal hip activation, and HEP    Frequency/Duration:  1-2 days per week for 4 Weeks:  Interventions:  [x]  Therapeutic exercise including: strength training, ROM, for LE, Glutes and core   [x]  NMR activation and proprioception for glutes , LE and Core   [x]  Manual therapy as indicated for Hip complex, LE and spine to include: Dry Needling/IASTM, STM, PROM, Gr I-IV mobilizations, manipulation. [x]  Modalities as needed that may include: thermal agents, E-stim, Biofeedback, US, iontophoresis as indicated  [x]  Patient education on joint protection, postural re-education, activity modification, progression of HEP. HEP instruction:   Access Code: [email or web address]  URL: [email or web address]  Date: 11/05/2021  Prepared by: Raul Oconnell Ulliman  Exercises   Supine Lower Trunk Rotation - 1 x daily - 7 x weekly - 2 sets - 10 reps   Supine Single Knee to Chest Stretch - 1 x daily - 7 x weekly - 3 reps - 30 sec hold   Supine Active Straight Leg Raise - 1 x daily - 7 x weekly - 2 sets - 10 reps   Hooklying Clamshell with Resistance - 1 x daily - 7 x weekly - 2 sets - 10 reps - 2 sec hold      GOALS:  Patient stated goal: \"To have no pain and return to my prior activity level. \"  [] Progressing: [] Met: [] Not Met: [] Adjusted  Therapist goals for Patient:   Short Term Goals: To be achieved in: 2 weeks  1. Independent in HEP and progression per patient tolerance, in order to prevent re-injury. [] Progressing: [] Met: [] Not Met: [] Adjusted  2. Patient will have a decrease in pain to facilitate improvement in movement, function, and ADLs as indicated by Functional Deficits. [] Progressing: [] Met: [] Not Met: [] Adjusted    Long Term Goals: To be achieved in: 4 weeks  1. Disability index score of 65 or less for the [place] to assist with reaching prior level of function. [] Progressing: [] Met: [] Not Met: [] Adjusted  2. Patient will demonstrate increased AROM to WNL, good LS mobility, good hip ROM to allow for proper joint functioning as indicated by patients Functional Deficits. [] Progressing: [] Met: [] Not Met: [] Adjusted  3. Patient will demonstrate an increase in Strength to good proximal hip and core activation to allow for proper functional mobility as indicated by patients Functional Deficits. [] Progressing: [] Met: [] Not Met: [] Adjusted  4. Patient will return to sit to stand and rolling in bed functional activities without increased symptoms or restriction. [] Progressing: [] Met: [] Not Met: [] Adjusted  5. Patient will return to carrying groceries without increased pain/symptoms.    [] Progressing: [] Met: [] Not Met: [] Adjusted     Electronically signed by:  Alcon Bustamante PT        Note: If patient does not return for scheduled/recommended follow up visits, this note will serve as a discharge from care along with the most recent update on progress.

## 2021-11-05 NOTE — FLOWSHEET NOTE
[facility]. [address]  Phone: [phone]   Fax:     [phone]    Physical Therapy Treatment Note/ Progress Report:     Date:  2021    Patient Name:  Nay Rodas    :  1960  MRN: [id number]    Pertinent Medical History: Additional Pertinent Hx: HTN    Medical/Treatment Diagnosis Information:  · Diagnosis: M48.061 (ICD-10-CM) - Spinal stenosis of lumbar region without neurogenic claudication  · Treatment Diagnosis: Decreased strength and mobility    Insurance/Certification information:  PT Insurance Information: [employer]  Physician Information:  Referring Practitioner: Vandana Florence  Plan of care signed (Y/N): faxed on     Date of Patient follow up with Physician:      Progress Report: []  Yes  [x]  No     Date Range for reporting period:  Beginnin2021  Ending:    Progress report due (10 Rx/or 30 days whichever is less): 43    Recertification due (POC duration/ or 90 days whichever is less)     Visit # POC/ Insurance Allowable Auth Needed   1 bomn []Yes    [x]No     Functional Scale:       Date Assessed: at eval (21)  Test: [place]  Score:  80    Pain level:  8/10     History of Injury: Patient reports LBP that is chronic. Pain located in lumbar region across belt line on both sides. Pain in R leg that travels down to the foot. Reports N/T. No falls recently, but feels uneasy on feet requiring assistance (I.e. furniture walking). Pt utilizes a single point cane.  Pt reports she had PT in the past for her back and it helped significantly.        SUBJECTIVE:  See eval    OBJECTIVE:    Observation:    Test measurements:      RESTRICTIONS/PRECAUTIONS: None    Exercises/Interventions:     Therapeutic Ex (13758)   Min: Resistance/Reps Notes/Cues   LTR  SKTC  SLR  Clamshell (supine) 2x10  [id number]  2x10  Yellow t-band, x20 HEP Manual Intervention (26070) Min:               NMR re-education (90633)   Min:     Mf Activation- re-ed     TrA Re-ed activation     Glute Max re-ed activation          Therapeutic Activity (98475) Min:               Modalities  Min:             Other Therapeutic Activities:  Pt was educated on PT POC, Diagnosis, Prognosis, pathomechanics as well as frequency and duration of scheduling future physical therapy appointments. Time was also taken on this day to answer all patient questions and participation in PT. Reviewed appointment policy in detail with patient and patient verbalized understanding. Home Exercise Program:     Therapeutic Exercise and NMR EXR  [x] (27911) Provided verbal/tactile cueing for activities related to strengthening, flexibility, endurance, ROM  for improvements in proximal hip and core control with self care, mobility, lifting and ambulation.  [] (72127) Provided verbal/tactile cueing for activities related to improving balance, coordination, kinesthetic sense, posture, motor skill, proprioception  to assist with core control in self care, mobility, lifting, and ambulation.      Therapeutic Activities:    [] (42786 or 75464) Provided verbal/tactile cueing for activities related to improving balance, coordination, kinesthetic sense, posture, motor skill, proprioception and motor activation to allow for proper function  with self care and ADLs  [] (21947) Provided training and instruction to the patient for proper core and proximal hip recruitment and positioning with ambulation re-education     Home Exercise Program:    [x] (98783) Reviewed/Progressed HEP activities related to strengthening, flexibility, endurance, ROM of core, proximal hip and LE for functional self-care, mobility, lifting and ambulation   [] (23827) Reviewed/Progressed HEP activities related to improving balance, coordination, kinesthetic sense, posture, motor skill, proprioception of core, proximal hip and LE for self care, mobility, lifting, and ambulation      Manual Treatments:  PROM / STM / Oscillations-Mobs:  G-I, II, III, IV (PA's, Inf., Post.)  [] (45789) Provided manual therapy to mobilize proximal hip and LS spine soft tissue/joints for the purpose of modulating pain, promoting relaxation,  increasing ROM, reducing/eliminating soft tissue swelling/inflammation/restriction, improving soft tissue extensibility and allowing for proper ROM for normal function with self care, mobility, lifting and ambulation. Approval Dates:  CPT Code Units Approved Units Used  Date Updated:                     Charges:  Timed Code Treatment Minutes: 30   Total Treatment Minutes: 50     [x] EVAL (LOW) 13760 (typically 20 minutes face-to-face)  [] EVAL (MOD) 16896 (typically 30 minutes face-to-face)  [] EVAL (HIGH) 22403 (typically 45 minutes face-to-face)  [] RE-EVAL     [x] PP(11636) x   2  [] Dry needle 1 or 2 Muscles (10118)  [] NMR (33550) x     [] Dry needle 3+ Muscles (98490)  [] Manual (90117) x     [] Ultrasound (42713) x  [] TA (31116) x     [] Mech Traction (05560)  [] ES(attended) (32464)     [] ES (un) (99421):   [] Vasopump (25725) [] Ionto (92209)   [] Other:    GOALS:  Patient stated goal: \"To have no pain and return to my prior activity level. \"  []? Progressing: []? Met: []? Not Met: []? Adjusted  Therapist goals for Patient:   Short Term Goals: To be achieved in: 2 weeks  1. Independent in HEP and progression per patient tolerance, in order to prevent re-injury. []? Progressing: []? Met: []? Not Met: []? Adjusted  2. Patient will have a decrease in pain to facilitate improvement in movement, function, and ADLs as indicated by Functional Deficits. []? Progressing: []? Met: []? Not Met: []? Adjusted     Long Term Goals: To be achieved in: 4 weeks  1. Disability index score of 65 or less for the [facility] to assist with reaching prior level of function. []? Progressing: []? Met: []?  Not Met: []? Adjusted  2. Patient will demonstrate increased AROM to WNL, good LS mobility, good hip ROM to allow for proper joint functioning as indicated by patients Functional Deficits. []? Progressing: []? Met: []? Not Met: []? Adjusted  3. Patient will demonstrate an increase in Strength to good proximal hip and core activation to allow for proper functional mobility as indicated by patients Functional Deficits. []? Progressing: []? Met: []? Not Met: []? Adjusted  4. Patient will return to sit to stand and rolling in bed functional activities without increased symptoms or restriction. []? Progressing: []? Met: []? Not Met: []? Adjusted  5. Patient will return to carrying groceries without increased pain/symptoms. []? Progressing: []? Met: []? Not Met: []? Adjusted            ASSESSMENT:  See eval    Treatment/Activity Tolerance:  [x] Patient tolerated treatment well [] Patient limited by fatique  [] Patient limited by pain  [] Patient limited by other medical complications  [] Other:     Overall Progression Towards Functional goals/ Treatment Progress Update:  [] Patient is progressing as expected towards functional goals listed. [] Progression is slowed due to complexities/Impairments listed. [] Progression has been slowed due to co-morbidities.   [x] Plan just implemented, too soon to assess goals progression <30days   [] Goals require adjustment due to lack of progress  [] Patient is not progressing as expected and requires additional follow up with physician  [] Other:    Prognosis for POC: [x] Good [] Fair  [] Poor    Patient requires continued skilled intervention: [x] Yes  [] No        PLAN: See eval  [] Continue per plan of care [] Alter current plan (see comments)  [x] Plan of care initiated [] Hold pending MD visit [] Discharge    Electronically signed by: Monika Buitrago PT    Note: If patient does not return for scheduled/recommended follow up visits, this note will serve as a discharge from care along with the most recent update on progress.

## 2021-11-09 ENCOUNTER — HOSPITAL ENCOUNTER (OUTPATIENT)
Dept: PHYSICAL THERAPY | Age: 61
Setting detail: THERAPIES SERIES
Discharge: HOME | End: 2021-11-09
Payer: COMMERCIAL

## 2021-11-09 PROCEDURE — 97110 THERAPEUTIC EXERCISES: CPT

## 2021-11-09 PROCEDURE — 97530 THERAPEUTIC ACTIVITIES: CPT

## 2021-11-09 NOTE — FLOWSHEET NOTE
Therapeutic Ex (93044)   Min: 30 min Resistance/Reps Notes/Cues   LTR  SKTC  SLR    Clamshell (supine) 2x10  [id number]  2x10    Yellow t-band, x20 HEP - performed  - performed  - performed, discomfort on R  -Not performed on 11/9   DKTC 2x20 sec Relief    Sidelying Hip Abd 2x10 -difficulty on L side requiring tactile cues   Seated PB Roll Outs L, R, C - x15 total                             Manual Intervention (25931) Min:               NMR re-education (88354)   Min:     Mf Activation- re-ed     TrA Re-ed activation     Glute Max re-ed activation          Therapeutic Activity (64259) Min: 15 min     Bridging  2x15    Mini squats at table 2x10 Cues for proper LE body mechanics    NuStep Lv4x5 min    Modalities  Min:             Other Therapeutic Activities:  Pt was educated on PT POC, Diagnosis, Prognosis, pathomechanics as well as frequency and duration of scheduling future physical therapy appointments. Time was also taken on this day to answer all patient questions and participation in PT. Reviewed appointment policy in detail with patient and patient verbalized understanding. Home Exercise Program:    LTR (2x10)   SKTC (2x30 sec)   SLR (2x10)   Clamshell (supine) (2x10) yellow t-band    Therapeutic Exercise and NMR EXR  [x] (93511) Provided verbal/tactile cueing for activities related to strengthening, flexibility, endurance, ROM  for improvements in proximal hip and core control with self care, mobility, lifting and ambulation.  [] (61782) Provided verbal/tactile cueing for activities related to improving balance, coordination, kinesthetic sense, posture, motor skill, proprioception  to assist with core control in self care, mobility, lifting, and ambulation.      Therapeutic Activities:    [x] (82886 or 16223) Provided verbal/tactile cueing for activities related to improving balance, coordination, kinesthetic sense, posture, motor skill, proprioception and motor activation to allow for proper function with self care and ADLs  [] (31666) Provided training and instruction to the patient for proper core and proximal hip recruitment and positioning with ambulation re-education     Home Exercise Program:    [x] (12740) Reviewed/Progressed HEP activities related to strengthening, flexibility, endurance, ROM of core, proximal hip and LE for functional self-care, mobility, lifting and ambulation   [] (73447) Reviewed/Progressed HEP activities related to improving balance, coordination, kinesthetic sense, posture, motor skill, proprioception of core, proximal hip and LE for self care, mobility, lifting, and ambulation      Manual Treatments:  PROM / STM / Oscillations-Mobs:  G-I, II, III, IV (PA's, Inf., Post.)  [] (05210) Provided manual therapy to mobilize proximal hip and LS spine soft tissue/joints for the purpose of modulating pain, promoting relaxation,  increasing ROM, reducing/eliminating soft tissue swelling/inflammation/restriction, improving soft tissue extensibility and allowing for proper ROM for normal function with self care, mobility, lifting and ambulation. Approval Dates:  CPT Code Units Approved Units Used  Date Updated:                     Charges:  Timed Code Treatment Minutes: 45   Total Treatment Minutes: 50     [] EVAL (LOW) 76385 (typically 20 minutes face-to-face)  [] EVAL (MOD) 39504 (typically 30 minutes face-to-face)  [] EVAL (HIGH) 16228 (typically 45 minutes face-to-face)  [] RE-EVAL     [x] AE(37328) x   2  [] Dry needle 1 or 2 Muscles (28191)  [] NMR (63169) x     [] Dry needle 3+ Muscles (81940)  [] Manual (39978) x     [] Ultrasound (16328) x  [x] TA (97661) x 1    [] Mech Traction (29175)  [] ES(attended) (92943)     [] ES (un) (17626):   [] Vasopump (24283) [] Ionto (17542)   [] Other:    GOALS:  Patient stated goal: \"To have no pain and return to my prior activity level. \"  []? Progressing: []? Met: []? Not Met: []? Adjusted  Therapist goals for Patient:   Short Term Goals:  To be achieved in: 2 weeks  1. Independent in HEP and progression per patient tolerance, in order to prevent re-injury. []? Progressing: []? Met: []? Not Met: []? Adjusted  2. Patient will have a decrease in pain to facilitate improvement in movement, function, and ADLs as indicated by Functional Deficits. []? Progressing: []? Met: []? Not Met: []? Adjusted     Long Term Goals: To be achieved in: 4 weeks  1. Disability index score of 65 or less for the [place] to assist with reaching prior level of function. []? Progressing: []? Met: []? Not Met: []? Adjusted  2. Patient will demonstrate increased AROM to WNL, good LS mobility, good hip ROM to allow for proper joint functioning as indicated by patients Functional Deficits. []? Progressing: []? Met: []? Not Met: []? Adjusted  3. Patient will demonstrate an increase in Strength to good proximal hip and core activation to allow for proper functional mobility as indicated by patients Functional Deficits. []? Progressing: []? Met: []? Not Met: []? Adjusted  4. Patient will return to sit to stand and rolling in bed functional activities without increased symptoms or restriction. []? Progressing: []? Met: []? Not Met: []? Adjusted  5. Patient will return to carrying groceries without increased pain/symptoms. []? Progressing: []? Met: []? Not Met: []? Adjusted            ASSESSMENT:  Pt demonstrates a good tolerance to PT follow up session. Pt displays decreased glute/quad strength requiring tactile and verbal cues for proper activations throughout the exercises. Pt required cues for proper LE body mechanics during squatting exercises. No adverse reaction to today's treatment. Continue to progress patient as tolerated.      Treatment/Activity Tolerance:  [x] Patient tolerated treatment well [] Patient limited by fatique  [] Patient limited by pain  [] Patient limited by other medical complications  [] Other:     Overall Progression Towards Functional goals/ Treatment Progress Update:  [] Patient is progressing as expected towards functional goals listed. [] Progression is slowed due to complexities/Impairments listed. [] Progression has been slowed due to co-morbidities. [x] Plan just implemented, too soon to assess goals progression <30days   [] Goals require adjustment due to lack of progress  [] Patient is not progressing as expected and requires additional follow up with physician  [] Other:    Prognosis for POC: [x] Good [] Fair  [] Poor    Patient requires continued skilled intervention: [x] Yes  [] No        PLAN: See eval  [] Continue per plan of care [] Alter current plan (see comments)  [x] Plan of care initiated [] Hold pending MD visit [] Discharge    Electronically signed by: Armando Bronson PT    Note: If patient does not return for scheduled/recommended follow up visits, this note will serve as a discharge from care along with the most recent update on progress.

## 2021-11-15 ENCOUNTER — HOSPITAL ENCOUNTER (OUTPATIENT)
Dept: PHYSICAL THERAPY | Age: 61
Setting detail: THERAPIES SERIES
Discharge: HOME | End: 2021-11-15
Payer: COMMERCIAL

## 2021-11-15 PROCEDURE — 97530 THERAPEUTIC ACTIVITIES: CPT

## 2021-11-15 PROCEDURE — 97110 THERAPEUTIC EXERCISES: CPT

## 2021-11-15 NOTE — FLOWSHEET NOTE
[facility] [place]  Phone: [phone]   Fax:     [phone]    Physical Therapy Treatment Note/ Progress Report:     Date:  11/15/2021    Patient Name:  Rahul Stark    :  1960  MRN: [id number]    Pertinent Medical History: Additional Pertinent Hx: HTN    Medical/Treatment Diagnosis Information:  · Diagnosis: M48.061 (ICD-10-CM) - Spinal stenosis of lumbar region without neurogenic claudication  · Treatment Diagnosis: Decreased strength and mobility    Insurance/Certification information:  PT Insurance Information: [employer]  Physician Information:  Referring Practitioner: Harvey Schafer  Plan of care signed (Y/N): faxed on     Date of Patient follow up with Physician:      Progress Report: []  Yes  [x]  No     Date Range for reporting period:  Beginnin2021  Ending:    Progress report due (10 Rx/or 30 days whichever is less):     Recertification due (POC duration/ or 90 days whichever is less)     Visit # POC/ Insurance Allowable Auth Needed   3 bomn []Yes    [x]No     Functional Scale:       Date Assessed: at eval (21)  Test: [place]  Score:  80    Pain level:  8/10     History of Injury: Patient reports LBP that is chronic. Pain located in lumbar region across belt line on both sides. Pain in R leg that travels down to the foot. Reports N/T. No falls recently, but feels uneasy on feet requiring assistance (I.e. furniture walking). Pt utilizes a single point cane. Pt reports she had PT in the past for her back and it helped significantly.        SUBJECTIVE:    21: Pt reports she felt really good after the first PT appointment. She reports compliance with HEP. She states her R side is a little bothersome today. 11/15/21: Pt reports after doing the bike her back was bothering her later that day.  She states the exercises are helpful and provide her with a lot of relief. OBJECTIVE:    Observation:    Test measurements:      RESTRICTIONS/PRECAUTIONS: None    Exercises/Interventions:     Therapeutic Ex (05078)   Min: 35 min Resistance/Reps Notes/Cues   LTR  SKTC  SLR  Supine Hip Add (ball)  Clamshell (supine) 2x10  [id number]  2x10  2x10  Yellow t-band, x20  Good Jose J. DKTC 2x20 sec Relief    Sidelying Hip Abd 2x10 -difficulty on L side requiring tactile cues   Seated PB Roll Outs L, R, C - x15 total    BKFO x10 each side  Good Jose J. Quad str (prone) 3x30 sec                   Manual Intervention (03813) Min:               NMR re-education (51402)   Min:     Mf Activation- re-ed     TrA Re-ed activation     Glute Max re-ed activation          Therapeutic Activity (78289) Min: 10 min     Bridging  2x15    Mini squats at table 2x10 Cues for proper LE body mechanics    NuStep Lv4x5 min Hold secondary to discomfort   Modalities  Min:             Other Therapeutic Activities:  Pt was educated on PT POC, Diagnosis, Prognosis, pathomechanics as well as frequency and duration of scheduling future physical therapy appointments. Time was also taken on this day to answer all patient questions and participation in PT. Reviewed appointment policy in detail with patient and patient verbalized understanding. Home Exercise Program:    LTR (2x10)   SKTC (2x30 sec)   SLR (2x10)   Clamshell (supine) (2x10) yellow t-band    Therapeutic Exercise and NMR EXR  [x] (26889) Provided verbal/tactile cueing for activities related to strengthening, flexibility, endurance, ROM  for improvements in proximal hip and core control with self care, mobility, lifting and ambulation.  [] (77340) Provided verbal/tactile cueing for activities related to improving balance, coordination, kinesthetic sense, posture, motor skill, proprioception  to assist with core control in self care, mobility, lifting, and ambulation.      Therapeutic Activities:    [x] (98706 or 60897) Provided verbal/tactile cueing for activities related to improving balance, coordination, kinesthetic sense, posture, motor skill, proprioception and motor activation to allow for proper function  with self care and ADLs  [] (00799) Provided training and instruction to the patient for proper core and proximal hip recruitment and positioning with ambulation re-education     Home Exercise Program:    [x] (98537) Reviewed/Progressed HEP activities related to strengthening, flexibility, endurance, ROM of core, proximal hip and LE for functional self-care, mobility, lifting and ambulation   [] (87971) Reviewed/Progressed HEP activities related to improving balance, coordination, kinesthetic sense, posture, motor skill, proprioception of core, proximal hip and LE for self care, mobility, lifting, and ambulation      Manual Treatments:  PROM / STM / Oscillations-Mobs:  G-I, II, III, IV (PA's, Inf., Post.)  [] (61603) Provided manual therapy to mobilize proximal hip and LS spine soft tissue/joints for the purpose of modulating pain, promoting relaxation,  increasing ROM, reducing/eliminating soft tissue swelling/inflammation/restriction, improving soft tissue extensibility and allowing for proper ROM for normal function with self care, mobility, lifting and ambulation.      Approval Dates:  CPT Code Units Approved Units Used  Date Updated:                     Charges:  Timed Code Treatment Minutes: 45   Total Treatment Minutes: 45     [] EVAL (LOW) 55696 (typically 20 minutes face-to-face)  [] EVAL (MOD) 73812 (typically 30 minutes face-to-face)  [] EVAL (HIGH) 05915 (typically 45 minutes face-to-face)  [] RE-EVAL     [x] AA(20130) x   2  [] Dry needle 1 or 2 Muscles (00213)  [] NMR (65067) x     [] Dry needle 3+ Muscles (75741)  [] Manual (19925) x     [] Ultrasound (28902) x  [x] TA (77380) x 1    [] Mech Traction (34704)  [] ES(attended) (43345)     [] ES (un) (15324):   [] Vasopump (36861) [] Ionto (13619)   [] Other:    GOALS:  Patient stated goal: \"To have no pain and return to my prior activity level. \"  []? Progressing: []? Met: []? Not Met: []? Adjusted  Therapist goals for Patient:   Short Term Goals: To be achieved in: 2 weeks  1. Independent in HEP and progression per patient tolerance, in order to prevent re-injury. []? Progressing: []? Met: []? Not Met: []? Adjusted  2. Patient will have a decrease in pain to facilitate improvement in movement, function, and ADLs as indicated by Functional Deficits. []? Progressing: []? Met: []? Not Met: []? Adjusted     Long Term Goals: To be achieved in: 4 weeks  1. Disability index score of 65 or less for the [place] to assist with reaching prior level of function. []? Progressing: []? Met: []? Not Met: []? Adjusted  2. Patient will demonstrate increased AROM to WNL, good LS mobility, good hip ROM to allow for proper joint functioning as indicated by patients Functional Deficits. []? Progressing: []? Met: []? Not Met: []? Adjusted  3. Patient will demonstrate an increase in Strength to good proximal hip and core activation to allow for proper functional mobility as indicated by patients Functional Deficits. []? Progressing: []? Met: []? Not Met: []? Adjusted  4. Patient will return to sit to stand and rolling in bed functional activities without increased symptoms or restriction. []? Progressing: []? Met: []? Not Met: []? Adjusted  5. Patient will return to carrying groceries without increased pain/symptoms. []? Progressing: []? Met: []? Not Met: []? Adjusted            ASSESSMENT:  Pt demonstrates a good tolerance to PT follow up session. Pt displays decreased glute/quad strength requiring tactile and verbal cues for proper activations throughout the exercises. Pt demonstrates weakness in L glute > R. Bilateral quadriceps/hip flexor tightness evident. No adverse reaction to today's treatment. Continue to progress patient as tolerated.      Treatment/Activity Tolerance:  [x] Patient tolerated treatment well [] Patient limited by fatique  [] Patient limited by pain  [] Patient limited by other medical complications  [] Other:     Overall Progression Towards Functional goals/ Treatment Progress Update:  [] Patient is progressing as expected towards functional goals listed. [] Progression is slowed due to complexities/Impairments listed. [] Progression has been slowed due to co-morbidities. [x] Plan just implemented, too soon to assess goals progression <30days   [] Goals require adjustment due to lack of progress  [] Patient is not progressing as expected and requires additional follow up with physician  [] Other:    Prognosis for POC: [x] Good [] Fair  [] Poor    Patient requires continued skilled intervention: [x] Yes  [] No        PLAN: See eval  [] Continue per plan of care [] Alter current plan (see comments)  [x] Plan of care initiated [] Hold pending MD visit [] Discharge    Electronically signed by: Peter Madrid, PT    Note: If patient does not return for scheduled/recommended follow up visits, this note will serve as a discharge from care along with the most recent update on progress.

## 2021-11-30 ENCOUNTER — HOSPITAL ENCOUNTER (OUTPATIENT)
Dept: PHYSICAL THERAPY | Age: 61
Setting detail: THERAPIES SERIES
Discharge: HOME | End: 2021-11-30
Payer: COMMERCIAL

## 2021-11-30 PROCEDURE — 97110 THERAPEUTIC EXERCISES: CPT

## 2021-11-30 PROCEDURE — 97530 THERAPEUTIC ACTIVITIES: CPT

## 2021-11-30 NOTE — FLOWSHEET NOTE
[facility] [place]  Phone: [phone]   Fax:     [phone]    Physical Therapy Treatment Note/ Progress Report:     Date:  2021    Patient Name:  Quang De La Torre    :  1960  MRN: [id number]    Pertinent Medical History: Additional Pertinent Hx: HTN    Medical/Treatment Diagnosis Information:  · Diagnosis: M48.061 (ICD-10-CM) - Spinal stenosis of lumbar region without neurogenic claudication  · Treatment Diagnosis: Decreased strength and mobility    Insurance/Certification information:  PT Insurance Information: [employer]  Physician Information:  Referring Practitioner: Eduar Best  Plan of care signed (Y/N): faxed on     Date of Patient follow up with Physician:      Progress Report: []  Yes  [x]  No     Date Range for reporting period:  Beginnin2021  Ending:    Progress report due (10 Rx/or 30 days whichever is less): 60    Recertification due (POC duration/ or 90 days whichever is less)     Visit # POC/ Insurance Allowable Auth Needed   4   to date 27 per calendar year  (3 visits in 2021) []Yes    [x]No     Functional Scale:       Date Assessed: at eval (21)  Test: [place]  Score:  80    Pain level:  8/10     History of Injury: Patient reports LBP that is chronic. Pain located in lumbar region across belt line on both sides. Pain in R leg that travels down to the foot. Reports N/T. No falls recently, but feels uneasy on feet requiring assistance (I.e. furniture walking). Pt utilizes a single point cane. Pt reports she had PT in the past for her back and it helped significantly.        SUBJECTIVE:    21: Pt reports she felt really good after the first PT appointment. She reports compliance with HEP. She states her R side is a little bothersome today.    11/15/21: Pt reports after doing the bike her back was bothering her later that day. She states the exercises are helpful and provide her with a lot of relief. 11/30/21: Pt reports, \"I have good days and bad days, today I am not feeling the best.\"     OBJECTIVE:    Observation:    Test measurements:      RESTRICTIONS/PRECAUTIONS: None    Exercises/Interventions:     Therapeutic Ex (70268)   Min: 35 min Resistance/Reps Notes/Cues   LTR  SKTC  SLR  Supine Hip Add (ball)  Clamshell (sidelying) 2x10  [id number]  2x10    Red t-band, 2x10       HEP    Piriformis str 2x20 sec B sides   Sidelying Hip Abd  HOLD today   Seated PB Roll Outs L, R, C - x15 total Good Jose J./Relief   BKFO  HEP   Quad str (prone) 3x30 sec                   Manual Intervention (02361) Min:               NMR re-education (15708)   Min:     Mf Activation- re-ed     TrA Re-ed activation     Glute Max re-ed activation          Therapeutic Activity (02255) Min: 10 min     Bridging  2x15, c red t-band    Mini squats at table 2x10 Cues for proper LE body mechanics    NuStep Lv4x5 min Hold secondary to discomfort   Modalities  Min:             Other Therapeutic Activities:  Pt was educated on PT POC, Diagnosis, Prognosis, pathomechanics as well as frequency and duration of scheduling future physical therapy appointments. Time was also taken on this day to answer all patient questions and participation in PT. Reviewed appointment policy in detail with patient and patient verbalized understanding.      Home Exercise Program:    LTR (2x10)   SKTC (2x30 sec)   SLR (2x10)   Clamshell (supine) (2x10) yellow t-band    Therapeutic Exercise and NMR EXR  [x] (12670) Provided verbal/tactile cueing for activities related to strengthening, flexibility, endurance, ROM  for improvements in proximal hip and core control with self care, mobility, lifting and ambulation.  [] (78093) Provided verbal/tactile cueing for activities related to improving balance, coordination, kinesthetic sense, posture, motor skill, proprioception  to assist with core control in self care, mobility, lifting, and ambulation. Therapeutic Activities:    [x] (34040 or 77365) Provided verbal/tactile cueing for activities related to improving balance, coordination, kinesthetic sense, posture, motor skill, proprioception and motor activation to allow for proper function  with self care and ADLs  [] (27440) Provided training and instruction to the patient for proper core and proximal hip recruitment and positioning with ambulation re-education     Home Exercise Program:    [x] (24846) Reviewed/Progressed HEP activities related to strengthening, flexibility, endurance, ROM of core, proximal hip and LE for functional self-care, mobility, lifting and ambulation   [] (75391) Reviewed/Progressed HEP activities related to improving balance, coordination, kinesthetic sense, posture, motor skill, proprioception of core, proximal hip and LE for self care, mobility, lifting, and ambulation      Manual Treatments:  PROM / STM / Oscillations-Mobs:  G-I, II, III, IV (PA's, Inf., Post.)  [] (06782) Provided manual therapy to mobilize proximal hip and LS spine soft tissue/joints for the purpose of modulating pain, promoting relaxation,  increasing ROM, reducing/eliminating soft tissue swelling/inflammation/restriction, improving soft tissue extensibility and allowing for proper ROM for normal function with self care, mobility, lifting and ambulation.      Approval Dates:  CPT Code Units Approved Units Used  Date Updated:                     Charges:  Timed Code Treatment Minutes: 45   Total Treatment Minutes: 45     [] EVAL (LOW) 39495 (typically 20 minutes face-to-face)  [] EVAL (MOD) 34237 (typically 30 minutes face-to-face)  [] EVAL (HIGH) 17915 (typically 45 minutes face-to-face)  [] RE-EVAL     [x] KS(43057) x   2  [] Dry needle 1 or 2 Muscles (13250)  [] NMR (86175) x     [] Dry needle 3+ Muscles (97829)  [] Manual (52270) x     [] Ultrasound (10187) x  [x] TA (24056) x 1    [] Nichole Pimentel Traction (21484)  [] ES(attended) (81352)     [] ES (un) (26415):   [] Vasopump (91037) [] Ionto (89551)   [] Other:    GOALS:  Patient stated goal: \"To have no pain and return to my prior activity level. \"  []? Progressing: []? Met: []? Not Met: []? Adjusted  Therapist goals for Patient:   Short Term Goals: To be achieved in: 2 weeks  1. Independent in HEP and progression per patient tolerance, in order to prevent re-injury. []? Progressing: []? Met: []? Not Met: []? Adjusted  2. Patient will have a decrease in pain to facilitate improvement in movement, function, and ADLs as indicated by Functional Deficits. []? Progressing: []? Met: []? Not Met: []? Adjusted     Long Term Goals: To be achieved in: 4 weeks  1. Disability index score of 65 or less for the [place] to assist with reaching prior level of function. []? Progressing: []? Met: []? Not Met: []? Adjusted  2. Patient will demonstrate increased AROM to WNL, good LS mobility, good hip ROM to allow for proper joint functioning as indicated by patients Functional Deficits. []? Progressing: []? Met: []? Not Met: []? Adjusted  3. Patient will demonstrate an increase in Strength to good proximal hip and core activation to allow for proper functional mobility as indicated by patients Functional Deficits. []? Progressing: []? Met: []? Not Met: []? Adjusted  4. Patient will return to sit to stand and rolling in bed functional activities without increased symptoms or restriction. []? Progressing: []? Met: []? Not Met: []? Adjusted  5. Patient will return to carrying groceries without increased pain/symptoms. []? Progressing: []? Met: []? Not Met: []? Adjusted            ASSESSMENT:  Pt demonstrates a good tolerance to PT follow up session. Pt displays decreased core/glute/quad strength requiring tactile and verbal cues for proper activations throughout the exercises. Pt demonstrates weakness in L glute > R. No adverse reaction to today's treatment.  Continue to progress patient as tolerated. Treatment/Activity Tolerance:  [x] Patient tolerated treatment well [] Patient limited by fatique  [] Patient limited by pain  [] Patient limited by other medical complications  [] Other:     Overall Progression Towards Functional goals/ Treatment Progress Update:  [] Patient is progressing as expected towards functional goals listed. [] Progression is slowed due to complexities/Impairments listed. [] Progression has been slowed due to co-morbidities. [x] Plan just implemented, too soon to assess goals progression <30days   [] Goals require adjustment due to lack of progress  [] Patient is not progressing as expected and requires additional follow up with physician  [] Other:    Prognosis for POC: [x] Good [] Fair  [] Poor    Patient requires continued skilled intervention: [x] Yes  [] No        PLAN: See eval  [] Continue per plan of care [] Alter current plan (see comments)  [x] Plan of care initiated [] Hold pending MD visit [] Discharge    Electronically signed by: Randee Dill PT    Note: If patient does not return for scheduled/recommended follow up visits, this note will serve as a discharge from care along with the most recent update on progress.

## 2022-01-24 ENCOUNTER — TELEPHONE (OUTPATIENT)
Dept: PRIMARY CARE CLINIC | Age: 62
End: 2022-01-24

## 2022-01-24 ENCOUNTER — NURSE TRIAGE (OUTPATIENT)
Dept: OTHER | Facility: CLINIC | Age: 62
End: 2022-01-24

## 2022-01-24 NOTE — TELEPHONE ENCOUNTER
Pt has been having left shoulder pain for x2 days. Pt refuses to go to ED Pt would like to be seen.  Pt scheduled

## 2022-01-24 NOTE — TELEPHONE ENCOUNTER
Received call from Anthony Orellana at [facility] with [employer] Complaint. Subjective: Caller states \"Left arm pain\"     Current Symptoms: Left shoulder pain x 2 days. Pain worse with lifting arm. Chills and runny nose x 3-4 days. Denies acute injury. Onset: 2 days ago; sudden    Associated Symptoms: NA    Pain Severity: 9/10; sharp; constant    Temperature: denies fever    What has been tried: Tylenol    LMP: NA Pregnant: NA    Recommended disposition: go to ED now. Pt refused dispo, requesting to see PCP. Care advice provided, patient verbalizes understanding; denies any other questions or concerns; instructed to call back for any new or worsening symptoms. Writer provided warm transfer to [place] at [facility] for Refusal of an urgent dispo     Attention Provider: Thank you for allowing me to participate in the care of your patient. The patient was connected to triage in response to information provided to the [facility]. Please do not respond through this encounter as the response is not directed to a shared pool.       Reason for Disposition   Age > 36 and no obvious cause for pain, pain still present even when not moving the arm    Protocols used: ARM PAIN-ADULT-[place]

## 2022-02-21 ENCOUNTER — TELEPHONE (OUTPATIENT)
Dept: PRIMARY CARE CLINIC | Age: 62
End: 2022-02-21

## 2022-02-21 NOTE — TELEPHONE ENCOUNTER
----- Message from [employer] [facility] sent at 2022  8:15 AM EST -----  Subject: Message to Provider    QUESTIONS  Information for Provider? Patient called stating her daughter Acosta BOOKER 1960 [id number] Passed away yesterday. Jojo Cortez is   requesting a list of medications her daughter was on and all chart records   for the autopsy. Mother is not on [facility]. Please call Jojo Cortez at   [phone] ASAP.   ---------------------------------------------------------------------------  --------------  Corina AGUIRRE  What is the best way for the office to contact you? OK to leave message on   voicemail  Preferred Call Back Phone Number? [phone]  ---------------------------------------------------------------------------  --------------  SCRIPT ANSWERS  Relationship to Patient?  Self

## 2022-03-13 PROBLEM — F32.2 CURRENT SEVERE EPISODE OF MAJOR DEPRESSIVE DISORDER WITHOUT PSYCHOTIC FEATURES, UNSPECIFIED WHETHER RECURRENT (HCC): Status: ACTIVE | Noted: 2022-03-13

## 2022-08-29 ENCOUNTER — TELEPHONE (OUTPATIENT)
Dept: PRIMARY CARE CLINIC | Age: 62
End: 2022-08-29

## 2022-08-29 NOTE — TELEPHONE ENCOUNTER
----- Message from [facility] sent at 8/29/2022  9:21 AM EDT -----  Subject: Message to Provider    QUESTIONS  Information for Provider? Patient states she wants to establish Kalyani Harding as her PCP. Patient has appointment scheduled 8/30 w/current PCP>  ---------------------------------------------------------------------------  --------------  Katharine Washington INFO  [phone]; OK to leave message on voicemail  ---------------------------------------------------------------------------  --------------  SCRIPT ANSWERS  Relationship to Patient?  Self

## 2022-08-30 ENCOUNTER — OFFICE VISIT (OUTPATIENT)
Dept: PRIMARY CARE CLINIC | Age: 62
End: 2022-08-30
Payer: COMMERCIAL

## 2022-08-30 VITALS
DIASTOLIC BLOOD PRESSURE: 92 MMHG | TEMPERATURE: 98.6 F | OXYGEN SATURATION: 98 % | BODY MASS INDEX: 21.95 KG/M2 | SYSTOLIC BLOOD PRESSURE: 155 MMHG | HEART RATE: 68 BPM | HEIGHT: 66 IN | WEIGHT: 136.6 LBS

## 2022-08-30 DIAGNOSIS — Z12.31 ENCOUNTER FOR SCREENING MAMMOGRAM FOR MALIGNANT NEOPLASM OF BREAST: ICD-10-CM

## 2022-08-30 DIAGNOSIS — N63.0 MASS OF BREAST: ICD-10-CM

## 2022-08-30 DIAGNOSIS — Z12.11 SCREEN FOR COLON CANCER: ICD-10-CM

## 2022-08-30 DIAGNOSIS — M25.532 LEFT WRIST PAIN: Primary | ICD-10-CM

## 2022-08-30 DIAGNOSIS — Z12.4 CERVICAL CANCER SCREENING: ICD-10-CM

## 2022-08-30 PROCEDURE — G8427 DOCREV CUR MEDS BY ELIG CLIN: HCPCS | Performed by: NURSE PRACTITIONER

## 2022-08-30 PROCEDURE — G8420 CALC BMI NORM PARAMETERS: HCPCS | Performed by: NURSE PRACTITIONER

## 2022-08-30 PROCEDURE — 99214 OFFICE O/P EST MOD 30 MIN: CPT | Performed by: NURSE PRACTITIONER

## 2022-08-30 PROCEDURE — 83036 HEMOGLOBIN GLYCOSYLATED A1C: CPT | Performed by: NURSE PRACTITIONER

## 2022-08-30 PROCEDURE — 3017F COLORECTAL CA SCREEN DOC REV: CPT | Performed by: NURSE PRACTITIONER

## 2022-08-30 PROCEDURE — 1036F TOBACCO NON-USER: CPT | Performed by: NURSE PRACTITIONER

## 2022-08-30 SDOH — ECONOMIC STABILITY: FOOD INSECURITY: WITHIN THE PAST 12 MONTHS, YOU WORRIED THAT YOUR FOOD WOULD RUN OUT BEFORE YOU GOT MONEY TO BUY MORE.: NEVER TRUE

## 2022-08-30 SDOH — ECONOMIC STABILITY: FOOD INSECURITY: WITHIN THE PAST 12 MONTHS, THE FOOD YOU BOUGHT JUST DIDN'T LAST AND YOU DIDN'T HAVE MONEY TO GET MORE.: SOMETIMES TRUE

## 2022-08-30 ASSESSMENT — ENCOUNTER SYMPTOMS
CONSTIPATION: 0
CHEST TIGHTNESS: 0
ALLERGIC/IMMUNOLOGIC COMMENTS: HISTORY OF ALLERGIC RHINITIS
WHEEZING: 0
SHORTNESS OF BREATH: 0
DIARRHEA: 0

## 2022-08-30 ASSESSMENT — PATIENT HEALTH QUESTIONNAIRE - PHQ9
2. FEELING DOWN, DEPRESSED OR HOPELESS: 3
7. TROUBLE CONCENTRATING ON THINGS, SUCH AS READING THE NEWSPAPER OR WATCHING TELEVISION: 0
8. MOVING OR SPEAKING SO SLOWLY THAT OTHER PEOPLE COULD HAVE NOTICED. OR THE OPPOSITE, BEING SO FIGETY OR RESTLESS THAT YOU HAVE BEEN MOVING AROUND A LOT MORE THAN USUAL: 1
10. IF YOU CHECKED OFF ANY PROBLEMS, HOW DIFFICULT HAVE THESE PROBLEMS MADE IT FOR YOU TO DO YOUR WORK, TAKE CARE OF THINGS AT HOME, OR GET ALONG WITH OTHER PEOPLE: 0
4. FEELING TIRED OR HAVING LITTLE ENERGY: 0
5. POOR APPETITE OR OVEREATING: 0
3. TROUBLE FALLING OR STAYING ASLEEP: 1
SUM OF ALL RESPONSES TO PHQ QUESTIONS 1-9: 8
1. LITTLE INTEREST OR PLEASURE IN DOING THINGS: 3
6. FEELING BAD ABOUT YOURSELF - OR THAT YOU ARE A FAILURE OR HAVE LET YOURSELF OR YOUR FAMILY DOWN: 0
SUM OF ALL RESPONSES TO PHQ9 QUESTIONS 1 & 2: 6
SUM OF ALL RESPONSES TO PHQ QUESTIONS 1-9: 8
9. THOUGHTS THAT YOU WOULD BE BETTER OFF DEAD, OR OF HURTING YOURSELF: 0
SUM OF ALL RESPONSES TO PHQ QUESTIONS 1-9: 8
SUM OF ALL RESPONSES TO PHQ QUESTIONS 1-9: 8

## 2022-08-30 ASSESSMENT — SOCIAL DETERMINANTS OF HEALTH (SDOH): HOW HARD IS IT FOR YOU TO PAY FOR THE VERY BASICS LIKE FOOD, HOUSING, MEDICAL CARE, AND HEATING?: SOMEWHAT HARD

## 2022-08-30 NOTE — PROGRESS NOTES
Neema Pemberton (:  1960) is a 63 y.o. female,Established patient, here for evaluation of the following chief complaint(s):  Follow-up, Back Pain, and Wrist Pain (When she found her daughter she was hitting the wall and now its hard for her to bend her wrist.)         ASSESSMENT/PLAN:  1. Left wrist pain- gout vs arthritis  -     Sedimentation Rate; Future  -     C-Reactive Protein; Future  2. Mass of breast- due to previous abnormality of mammogram right breast  -Schedule     [facility] NICOLE DIGITAL DIAGNOSTIC UNILATERAL RIGHT; Future  3. Encounter for screening mammogram for malignant neoplasm of breast  - Schedule    [facility] DIGITAL SCREEN W OR WO CAD BILATERAL; Future  4. Cervical cancer screening  -Schedule     L[facility] - Keith Traylor, PATRICIA, Gynecology, [facility]  5. Screen for colon cancer  -     POCT glycosylated hemoglobin (Hb A1C):    Return in about 3 months (around 2022). Subjective   Found 44 yo daughter  in February, recently had Covid, probable heart attack. States she is not eating or sleeping well. Ambulating with cane  SUBJECTIVE/OBJECTIVE:  Wrist Pain   The pain is present in the left wrist. This is a chronic problem. The current episode started more than 1 month ago. The problem occurs intermittently. The problem has been unchanged. Pertinent negatives include no fever, inability to bear weight, joint locking, joint swelling, numbness, stiffness or tingling. The symptoms are aggravated by activity. Treatments tried: ace wrap. There is no history of diabetes. Hypertension  This is a chronic problem. The current episode started more than 1 year ago. The problem is controlled. Pertinent negatives include no chest pain, headaches, malaise/fatigue, neck pain, palpitations or shortness of breath. There are no associated agents to hypertension. Risk factors for coronary artery disease include post-menopausal state and sedentary lifestyle.  Past treatments include GERD   Ventral hernia repair 12/2012   Endocrine: Negative for polyuria. Musculoskeletal:  Negative for stiffness. History of osteoarthritis of right knee  12/2020 MRI:   1. Moderate to severe spinal canal stenosis at L4-L5  2. Multilevel neural foraminal narrowing as above. 3. Loss of disc space height with endplate irregularity  Modic type 1 degenerative endplate changes at C7-S5. Trochanteric bursitis of the left and right hip and mild degenerative arthritis of left and right knee. Skin:  Negative for rash. Allergic/Immunologic:        History of allergic rhinitis   Neurological:  Negative for tingling and numbness. History of headaches   Psychiatric/Behavioral:  Negative for agitation, decreased concentration and sleep disturbance. History of depression        Objective    height is 5' 6\" (1.676 m) and weight is 136 lb 9.6 oz (62 kg). Her temperature is 98.6 °F (37 °C). Her blood pressure is 155/92 (abnormal) and her pulse is 68. Her oxygen saturation is 98%.     BP Readings from Last 3 Encounters:   08/30/22 (!) 155/92   09/08/21 132/76   05/18/21 122/80      Wt Readings from Last 3 Encounters:   08/30/22 136 lb 9.6 oz (62 kg)   10/21/21 145 lb (65.8 kg)   09/09/21 145 lb (65.8 kg)      Past Medical History:   Diagnosis Date    Depression 12/17/2011    Headache(784.0) 6-7 months    pain in forehead    HTN (hypertension) 10/10/2011    Musculoskeletal back pain 1/10/2018    Primary osteoarthritis of right knee 1/10/2018    Sciatica of left side 8/27/2020      Past Surgical History:   Procedure Laterality Date    [address]    COLONOSCOPY  2003    problems with duodenum    HERNIA REPAIR      PAIN MANAGEMENT PROCEDURE Left 3/25/2021    LEFT L3, L4 TRANSFORAMINAL EPIDURAL STEROID INJECTION WITH FLUOROSCOPY (90117, [phone]) performed by Karol Payne MD at [address] [place] [address].  12/03/2012    VENTRAL HERNIA REPAIR WITH MESH             Social History     Tobacco Use    Smoking status: Former     Packs/day: 0.25     Years: 37.00     Pack years: 9.25     Types: Cigarettes     Quit date: 2/23/2012     Years since quitting: 10.5    Smokeless tobacco: Never    Tobacco comments:     smoke when nerves get bad   Vaping Use    Vaping Use: Never used   Substance Use Topics    Alcohol use: Not Currently     Comment: 2x a week    Drug use: No      Family History   Problem Relation Age of Onset    Cancer Father     Diabetes Mother     High Blood Pressure Mother     Stroke Mother       Outpatient Encounter Medications as of 8/30/2022   Medication Sig Dispense Refill    Cholecalciferol (VITAMIN D3) 125 MCG (5000 UT) TBDP Take 1 each by mouth daily 30 tablet 5    olmesartan-hydroCHLOROthiazide (BENICAR HCT) 40-12.5 MG per tablet Take 1 tablet by mouth daily 90 tablet 3    dexlansoprazole (DEXILANT) 60 MG CPDR delayed release capsule Take 1 tablet daily 90 capsule 3    gabapentin (NEURONTIN) 300 MG capsule Take 1 capsule by mouth 3 times daily. For back pain, take all the time. 90 capsule 5     No facility-administered encounter medications on file as of 8/30/2022. Physical Exam  Vitals reviewed. Constitutional:       Appearance: She is well-developed. HENT:      Head: Normocephalic. Right Ear: Hearing and external ear normal.      Left Ear: Hearing and external ear normal.      Nose: Nose normal.   Eyes:      General: Lids are normal.   Cardiovascular:      Rate and Rhythm: Normal rate and regular rhythm. Heart sounds: Normal heart sounds, S1 normal and S2 normal.   Pulmonary:      Effort: Pulmonary effort is normal.      Breath sounds: Normal breath sounds. Musculoskeletal:         General: Normal range of motion. Skin:     General: Skin is warm and dry. Findings: No rash. Neurological:      Mental Status: She is alert and oriented to person, place, and time. GCS: GCS eye subscore is 4.  GCS verbal subscore is 5. GCS motor subscore is 6. Psychiatric:         Speech: Speech normal.         Behavior: Behavior normal. Behavior is cooperative. On this date 8/30/2022 I have spent 15 minutes reviewing previous notes, test results and face to face with the patient discussing the diagnosis and importance of compliance with the treatment plan as well as documenting on the day of the visit. An electronic signature was used to authenticate this note.     --Chepe Bourne, APRN - CNP

## 2022-09-14 ENCOUNTER — TELEPHONE (OUTPATIENT)
Dept: PRIMARY CARE CLINIC | Age: 62
End: 2022-09-14

## 2022-09-14 NOTE — TELEPHONE ENCOUNTER
----- Message from Maribel Medina sent at 9/14/2022 12:00 PM EDT -----  Subject: Message to Provider    QUESTIONS  Information for Provider? Pt is contacting to inform PCP that she was   instructed to bring stool in for testing but will be unable to bring in   this sample until the end of the month due to car situation. Please   contact as soon as possible to discuss this issue.  ---------------------------------------------------------------------------  --------------  [address] [employer]  [phone]; Do not leave any message, patient will call back for answer  ---------------------------------------------------------------------------  --------------  SCRIPT ANSWERS  Relationship to Patient?  Self

## 2022-10-20 ENCOUNTER — TELEPHONE (OUTPATIENT)
Dept: PRIMARY CARE CLINIC | Age: 62
End: 2022-10-20

## 2022-10-20 NOTE — TELEPHONE ENCOUNTER
Patient called in wanting to establish care with Manuel Desai. I explained to her that she is no taking any new patients. She insisted that the doctor spoke to her personally and wanted us to squeeze her in. She said she would reach out to her on her own.     MARTINA

## 2022-12-12 ENCOUNTER — NURSE TRIAGE (OUTPATIENT)
Dept: OTHER | Facility: CLINIC | Age: 62
End: 2022-12-12

## 2022-12-12 NOTE — TELEPHONE ENCOUNTER
Location of patient: [place]    Received call from Navin Cruz at [facility]; Patient with Red Flag Complaint requesting to establish care with [facility]. Subjective: Caller states \"lump in breast; dizziness\"     Current Symptoms:  Follow up mammogram - a dot a while back on mammogram  Requesting pap smear   Dizziness x 3 mos     Onset:  Last mammogram - 1 year ago  Dizziness 3 mos     Pain Severity:   None     Temperature:    None     What has been tried:   None     Recommended disposition:   See PCP within 24 hours - see UC if unable to obtain est care appt within time frame. - Warm transfer to [facility] advice provided, patient verbalizes understanding; denies any other questions or concerns; instructed to call back for any new or worsening symptoms. Attention Provider: Thank you for allowing me to participate in the care of your patient. The patient was connected to triage in response to information provided to the [facility]. Please do not respond through this encounter as the response is not directed to a shared pool.     Reason for Disposition   [1] NO dizziness now AND [2] age > 61    Protocols used: Dizziness - Vertigo-ADULT-

## 2023-01-24 ENCOUNTER — TELEPHONE (OUTPATIENT)
Dept: PRIMARY CARE CLINIC | Age: 63
End: 2023-01-24

## 2023-01-24 ENCOUNTER — HOSPITAL ENCOUNTER (OUTPATIENT)
Dept: WOMENS IMAGING | Age: 63
Discharge: HOME | End: 2023-01-24
Payer: COMMERCIAL

## 2023-01-24 DIAGNOSIS — Z12.31 ENCOUNTER FOR SCREENING MAMMOGRAM FOR MALIGNANT NEOPLASM OF BREAST: ICD-10-CM

## 2023-01-24 DIAGNOSIS — N63.0 MASS OF BREAST: ICD-10-CM

## 2023-01-24 PROCEDURE — G0279 TOMOSYNTHESIS, MAMMO: HCPCS

## 2023-01-24 NOTE — TELEPHONE ENCOUNTER
Yumiko/rep stated that need new a order for digital mammogram diagnostic for bilateral recently sent by Dorota Carvalho; patient is at [facility] now.

## 2023-01-24 NOTE — TELEPHONE ENCOUNTER
Per Dr Talavera Senters:  okay for order. It looks like the test they are asking for is already being run from order 8/30/22. [facility] This is the test they are requesting. They said if they need any other order, they will let us know.

## 2023-02-10 ENCOUNTER — OFFICE VISIT (OUTPATIENT)
Dept: INTERNAL MEDICINE CLINIC | Age: 63
End: 2023-02-10
Payer: COMMERCIAL

## 2023-02-10 DIAGNOSIS — I10 ESSENTIAL HYPERTENSION: ICD-10-CM

## 2023-02-10 DIAGNOSIS — M51.36 DDD (DEGENERATIVE DISC DISEASE), LUMBAR: Primary | Chronic | ICD-10-CM

## 2023-02-10 DIAGNOSIS — M54.9 MUSCULOSKELETAL BACK PAIN: Chronic | ICD-10-CM

## 2023-02-10 DIAGNOSIS — R53.83 OTHER FATIGUE: ICD-10-CM

## 2023-02-10 DIAGNOSIS — I10 ESSENTIAL HYPERTENSION: Chronic | ICD-10-CM

## 2023-02-10 DIAGNOSIS — E55.9 VITAMIN D DEFICIENCY: ICD-10-CM

## 2023-02-10 PROCEDURE — 99214 OFFICE O/P EST MOD 30 MIN: CPT | Performed by: FAMILY MEDICINE

## 2023-02-10 RX ORDER — ASCORBIC ACID 250 MG
250 TABLET ORAL DAILY
Qty: 30 TABLET | Refills: 3 | Status: SHIPPED | OUTPATIENT
Start: 2023-02-10

## 2023-02-10 RX ORDER — OLMESARTAN MEDOXOMIL AND HYDROCHLOROTHIAZIDE 40/12.5 40; 12.5 MG/1; MG/1
1 TABLET ORAL DAILY
Qty: 90 TABLET | Refills: 3 | Status: SHIPPED | OUTPATIENT
Start: 2023-02-10

## 2023-02-10 SDOH — ECONOMIC STABILITY: FOOD INSECURITY: WITHIN THE PAST 12 MONTHS, THE FOOD YOU BOUGHT JUST DIDN'T LAST AND YOU DIDN'T HAVE MONEY TO GET MORE.: PATIENT DECLINED

## 2023-02-10 SDOH — ECONOMIC STABILITY: INCOME INSECURITY: HOW HARD IS IT FOR YOU TO PAY FOR THE VERY BASICS LIKE FOOD, HOUSING, MEDICAL CARE, AND HEATING?: PATIENT DECLINED

## 2023-02-10 SDOH — ECONOMIC STABILITY: HOUSING INSECURITY
IN THE LAST 12 MONTHS, WAS THERE A TIME WHEN YOU DID NOT HAVE A STEADY PLACE TO SLEEP OR SLEPT IN A SHELTER (INCLUDING NOW)?: PATIENT REFUSED

## 2023-02-10 SDOH — ECONOMIC STABILITY: FOOD INSECURITY: WITHIN THE PAST 12 MONTHS, YOU WORRIED THAT YOUR FOOD WOULD RUN OUT BEFORE YOU GOT MONEY TO BUY MORE.: PATIENT DECLINED

## 2023-02-10 ASSESSMENT — PATIENT HEALTH QUESTIONNAIRE - PHQ9
6. FEELING BAD ABOUT YOURSELF - OR THAT YOU ARE A FAILURE OR HAVE LET YOURSELF OR YOUR FAMILY DOWN: 0
SUM OF ALL RESPONSES TO PHQ9 QUESTIONS 1 & 2: 0
SUM OF ALL RESPONSES TO PHQ QUESTIONS 1-9: 0
10. IF YOU CHECKED OFF ANY PROBLEMS, HOW DIFFICULT HAVE THESE PROBLEMS MADE IT FOR YOU TO DO YOUR WORK, TAKE CARE OF THINGS AT HOME, OR GET ALONG WITH OTHER PEOPLE: 0
4. FEELING TIRED OR HAVING LITTLE ENERGY: 0
7. TROUBLE CONCENTRATING ON THINGS, SUCH AS READING THE NEWSPAPER OR WATCHING TELEVISION: 0
8. MOVING OR SPEAKING SO SLOWLY THAT OTHER PEOPLE COULD HAVE NOTICED. OR THE OPPOSITE, BEING SO FIGETY OR RESTLESS THAT YOU HAVE BEEN MOVING AROUND A LOT MORE THAN USUAL: 0
1. LITTLE INTEREST OR PLEASURE IN DOING THINGS: 0
2. FEELING DOWN, DEPRESSED OR HOPELESS: 0
9. THOUGHTS THAT YOU WOULD BE BETTER OFF DEAD, OR OF HURTING YOURSELF: 0
SUM OF ALL RESPONSES TO PHQ QUESTIONS 1-9: 0
3. TROUBLE FALLING OR STAYING ASLEEP: 0
SUM OF ALL RESPONSES TO PHQ QUESTIONS 1-9: 0
SUM OF ALL RESPONSES TO PHQ QUESTIONS 1-9: 0
5. POOR APPETITE OR OVEREATING: 0

## 2023-02-10 NOTE — PROGRESS NOTES
Nay Rodas (:  1960) is a 63 y.o. female,New patient, here for evaluation of the following chief complaint(s):  Established New Doctor      SUBJECTIVE:  2.10.23: Back pain -- Had pinched nerve from working at [facility] [employer] [facility]. Has back issues since that time. Has had to use a cane for at least 4-5 years since it occurred. Did have physical therapy and cortisone shots, but it does not fully ever go away. Hypertension -- has been taking her one medication for a few months now, and it has been relatively controlled     Last mammogram was done in 2023 -- right breast had spot that is totally benign, next mammogram in '    156/91 blood pressure -- has been out of blood pressure meds x 1 mo  -- will check labs and check blood pressure again after starting meds up again          I have reviewed the chart notes available from myself and other providers. I have reviewed and addressed all active problems and created or updated the problems list in detail, as needed    I have extensively reviewed and reconciled the medication list, discontinued medications not taking or no longer appropriate, and updated the active meds list    OBJECTIVE:  Review of Systems   Constitutional:  Negative for chills and fever. Respiratory:  Negative for cough and shortness of breath. Cardiovascular:  Negative for leg swelling. Gastrointestinal:  Negative for constipation, diarrhea, nausea and vomiting. Endocrine: Negative for polyuria. Genitourinary:  Negative for frequency. Musculoskeletal:  Positive for back pain and gait problem. Skin:  Negative for rash. There were no vitals filed for this visit. There is no height or weight on file to calculate BMI. Physical Exam  Constitutional:       Appearance: Normal appearance. Cardiovascular:      Rate and Rhythm: Normal rate and regular rhythm. Pulses: Normal pulses. Heart sounds: Normal heart sounds.    Pulmonary:      Effort: Pulmonary effort is normal.      Breath sounds: Normal breath sounds. Musculoskeletal:      Right lower leg: No edema. Left lower leg: No edema. Comments: Chronic low back pain   Neurological:      General: No focal deficit present. Mental Status: She is alert. Mental status is at baseline. Lab Results   Component Value Date    LABA1C 5.3 05/18/2021     Lab Results   Component Value Date    WBC 4.2 05/18/2021    HGB 13.8 05/18/2021    HCT 42.4 05/18/2021    MCV 87.4 05/18/2021     05/18/2021      Lab Results   Component Value Date/Time     05/18/2021 11:02 AM    K 4.7 05/18/2021 11:02 AM    K 4.1 04/01/2020 06:08 PM     05/18/2021 11:02 AM    CO2 30 05/18/2021 11:02 AM    BUN 17 05/18/2021 11:02 AM    CREATININE 0.9 05/18/2021 11:02 AM    GLUCOSE 92 05/18/2021 11:02 AM    CALCIUM 9.7 05/18/2021 11:02 AM       Lab Results   Component Value Date    CHOL 193 05/18/2021    CHOL 206 (H) 11/29/2012    CHOL 186 12/16/2011     Lab Results   Component Value Date    TRIG 107 05/18/2021    TRIG 86 12/16/2011     Lab Results   Component Value Date    HDL 68 (H) 05/18/2021    HDL 72 (H) 12/16/2011     Lab Results   Component Value Date    LDLCALC 104 (H) 05/18/2021    LDLCALC 97 12/16/2011     Lab Results   Component Value Date    LABVLDL 21 05/18/2021    LABVLDL 17 12/16/2011     No results found for: CHOLHDLRATIO     The 10-year ASCVD risk score (Suyapa DK, et al., 2019) is: 10%    Values used to calculate the score:      Age: 63 years      Sex: Female      Is Non- : Yes      Diabetic: No      Tobacco smoker: No      Systolic Blood Pressure: 860 mmHg      Is BP treated: Yes      HDL Cholesterol: 68 mg/dL      Total Cholesterol: 193 mg/dL     Patient received counseling and, if relevant, printed instructions for all symptoms listed in CC and HPI, as well as for all diagnoses brought onto today's visit note below.  Typical counseling includes, but is not limited to, non-pharmacologic measures to manage listed symptoms and conditions; appropriate use, risks and benefits for all prescribed medications; potential interactions between medications both prescribed and OTC; diet; exercise; healthy behaviors; and goalsetting to improve health. Patient or responsible party was involved in shared decision making and had opportunity to have all questions answered. Except as noted below, all chronic problems have been reviewed and are stable to continue medications or other therapy as previously documented in the patient's chart, with changes per orders or documentation below:    1. DDD (degenerative disc disease), lumbar  Comments:  has not had PT or evaluation in a long time, would like to see ortho.  will refer to ortho today  Orders:  -     R Sarmento [address]  2. Musculoskeletal back pain  Comments:  chronic low back pain, will refer to ortho  Orders:  -     R Sarmento [address]  3. Other fatigue  Comments:  will order labs to get evaluation  Orders:  -     Hemoglobin A1C; Future  -     Lipid Panel; Future  -     Vitamin D 25 Hydroxy; Future  -     Vitamin B12; Future  -     ascorbic acid (V-R VITAMIN C) 250 MG tablet; Take 1 tablet by mouth daily, Disp-30 tablet, R-3Normal  4. Essential hypertension  Comments:  has been out of blood pressure meds for at least one month. will refill and recheck on next visit  Orders:  -     Comprehensive Metabolic Panel; Future  -     olmesartan-hydroCHLOROthiazide (BENICAR HCT) 40-12.5 MG per tablet; Take 1 tablet by mouth daily, Disp-90 tablet, R-3Normal  5. Essential hypertension-controlled  -     Comprehensive Metabolic Panel; Future  -     olmesartan-hydroCHLOROthiazide (BENICAR HCT) 40-12.5 MG per tablet; Take 1 tablet by mouth daily, Disp-90 tablet, R-3Normal  6. Vitamin D deficiency  -     Cholecalciferol (VITAMIN D3) 125 MCG (5000 UT) TBDP;  Take 1 each by mouth daily, Disp-30 tablet, R-5Normal        Problem List          Unprioritized    Essential hypertension    Relevant Medications    olmesartan-hydroCHLOROthiazide (BENICAR HCT) 40-12.5 MG per tablet    Other Relevant Orders    Comprehensive Metabolic Panel    Musculoskeletal back pain    Relevant Orders    R Kyara [address] 114    DDD (degenerative disc disease), lumbar - Primary    Relevant Orders    Petr Edgaro Brian 23 This Encounter   Procedures    Comprehensive Metabolic Panel     Standing Status:   Future     Number of Occurrences:   1     Standing Expiration Date:   2/10/2024    Hemoglobin A1C     Standing Status:   Future     Number of Occurrences:   1     Standing Expiration Date:   2/10/2024    Lipid Panel     Standing Status:   Future     Number of Occurrences:   1     Standing Expiration Date:   2/10/2024    Vitamin D 25 Hydroxy     Standing Status:   Future     Number of Occurrences:   1     Standing Expiration Date:   2/10/2024    Vitamin B12     Standing Status:   Future     Number of Occurrences:   1     Standing Expiration Date:   2/10/2024    [facility] - [facility] - [facility]     Referral Priority:   Routine     Referral Type:   Eval and Treat     Referral Reason:   Specialty Services Required     Requested Specialty:   Physical Therapist     Number of Visits Requested:   1         Return in about 3 weeks (around 3/3/2023).         Dr. Shadia De La Paz and [address] [facility] - Internal Medicine and Pediatrics        Usual doctor's hours are:     Monday 7:30 am to 6:00 pm           Tuesday  7:30 am to 5:00 pm                                               Wednesday 7:30 am to 5:00 pm                                               Thursday 7:30 am to 5:00 pm                                               Friday 7:30 am to 4:00 pm           Saturdays, Sundays, and after hours: E-Visits are available    We observe most federal holidays and Good Friday. We ask that you only contact the office one time per issue or question, and please allow one full business day for a call back. Calling us back multiple times keeps us from being able to complete the work efficiently for you and our other patients. For medication renewals, please call your pharmacist to contact us, and be sure to allow at least 3 business days for processing before you need to  your medication. If you are sick or need an appointment that hasn't been planned, same day appointments are available every day the office is open: Monday, Tuesday, Wednesday, Thursday, and Friday. Call during office hours to schedule, even if it may not be with your regular physician. You may also call the office after 8 am on office days if you need to be seen from an issue the night before. During hours when the office is not normally open, your call will go to the messaging service which cannot provide any service other than paging the doctor. No prescriptions or other nonurgent matters will be handled and no voicemail is available, so please call back during office hours for these matters.        Electronically signed by Janette Flores DO on 2/10/2023 at 11:46 AM.

## 2023-02-10 NOTE — LETTER
[facility] Internal Medicine and Pediatric[facility]  [address] \[facility]\""  Phone: [phone]  Fax: [phone]    Diana Mlils DO        February 10, 2023     Patient: Renetta Lowe   YOB: 1960   Date of Visit: 2/10/2023       To Whom it May Concern:    Andi Conley was seen in my clinic on 2/10/2023. Her  may return to work on 2/10/23. If you have any questions or concerns, please don't hesitate to call.     Sincerely,         Diana Mills DO right total knee arthroplasty

## 2023-02-13 ASSESSMENT — ENCOUNTER SYMPTOMS
CONSTIPATION: 0
BACK PAIN: 1
VOMITING: 0
NAUSEA: 0
COUGH: 0
DIARRHEA: 0
SHORTNESS OF BREATH: 0

## 2023-02-15 DIAGNOSIS — M51.36 DDD (DEGENERATIVE DISC DISEASE), LUMBAR: Primary | ICD-10-CM

## 2023-03-02 NOTE — PLAN OF CARE
[facility]  Phone: [phone]   Fax:     [phone]                                                      Physical Therapy Certification    Dear Maged Chino DO,    We had the pleasure of evaluating the following patient for physical therapy services at [facility]. A summary of our findings can be found in the initial assessment below. This includes our plan of care. If you have any questions or concerns regarding these findings, please do not hesitate to contact me at the office phone number checked above.   Thank you for the referral.       Physician Signature:_______________________________Date:__________________  By signing above (or electronic signature), therapists plan is approved by physician      Patient: Dottie Dumont   : 1960   MRN: [id number]  Referring Physician: Maged Chino DO      Evaluation Date: 2023      Medical Diagnosis Information:  Medical Diagnosis: DDD (degenerative disc disease), lumbar [M51.36]  Musculoskeletal back pain [M54.9]   Treatment Diagnosis: Decreased functional mobility, impaired tolerance to prolonged ambulation                                       Insurance information: PT Insurance Information: BCBS      Precautions/ Contra-indications: ***  Latex Allergy:  [x]NO      []YES  Preferred Language for Healthcare:   [x]English       []other:    C-SSRS Triggered by Intake questionnaire (Past 2 wk assessment ):   [x] No, Questionnaire did not trigger screening.   [] Yes, Patient intake triggered C-SSRS Screening      [] C-SSRS Screening completed  [] PCP notified via Epic     SUBJECTIVE: Patient stated complaint:***    Relevant Medical History:Additional Pertinent Hx: Hypertension, depression  Functional Outcome Measure: MODIFIED OSWESTRY = ***    Pain Scale: ***/10  Easing factors: ***  Provocative factors: ***     Type: []Constant   []Intermittent  []Radiating []Localized []other:     Numbness/Tingling: ***    Occupation/School: ***    Living Status/Prior Level of Function: Independent with ADLs and IADLs, ***    OBJECTIVE:   Posture: ***    Functional Mobility/Transfers: ***    Palpation: ***    Gait: (include devices/WB status) ***    Bandages/Dressings/Incisions: NA***    Repeated Movements:***    ROM  Comments   Lumbar Flex     Lumbar Ext       ROM LEFT RIGHT Comments   Lumbar Side Bend      Lumbar Rotation      Quadrant      Hip Flexion      Hip Abd      Hip ER      Hip IR      Hip Extension      Knee Ext      Knee Flex      Hamstring Flex      Piriformis      Yanick test                Myotomes/Strength Normal Abnormal Comments   [x]ALL NORMAL      Hip Abd      Hip Ext      Hip flexion (L1-L2 femoral) [] []    Knee extension (L2-L4 femoral) [] []    Knee flexion (S1 sciatic)      Dorsiflexion (L4-L5 deep peroneal) [] []    Great Toe Ext (L5 deep peroneal nerve) [] []    Ankle Eversion (S1-S2 super peroneal) [] []    Ankle PF(S1-S2 tibial) [] []    Multifidus [] []    Transverse Ab [] []      Dermatomes Normal Abnormal Comments   [x]ALL NORMAL            inguinal area (L1)  [] []    anterior mid-thigh (L2) [] []    distal ant thigh/med knee (L3) [] []    medial lower leg and foot (L4) [] []    lateral lower leg and foot (L5) [] []    posterior calf (S1) [] []    medial calcaneus (S2) [] []      Reflexes Normal Abnormal Comments   [x]ALL NORMAL            S1-2 Seated achilles [] []    S1-2 Prone knee bend [] []    L3-4 Patellar tendon [] []    C5-6 Biceps [] []    C6 Brachioradialis [] []    C7-8 Triceps [] []    Clonus [] []    Babinski [] []    Bazan's [] []      Joint mobility: ***   []Normal    []Hypo   []Hyper    Neurodynamics: ***    Orthopedic Special Tests: ***   Neural dynamic tension testing Normal Abnormal Comments   Slump Test  - Degree of knee flexion:  [] [] SLR  [] []    0-30 [] []    30-70 [] []    Femoral nerve (L2-4) [] []       Normal Abnormal N/A Comments   Fwd Bend-aberrant or innominate mvmt) [] [] []    Trendelenburg [] [] []    Kemps/Quadrant [] [] []    Yon Soha [] [] []    ZACH/Ian [] [] []    Hip scour [] [] []    Supine to sit [] [] []    Prone knee bend [] [] []           Hip thrust [] [] []    SI distraction/compression [] [] []    Sacral Spring/thrust [] [] []               [x] Patient history, allergies, meds reviewed. Medical chart reviewed. See intake form. Review Of Systems (ROS):  [x]Performed Review of systems (Integumentary, CardioPulmonary, Neurological) by intake and observation. Intake form has been scanned into medical record. Patient has been instructed to contact their primary care physician regarding ROS issues if not already being addressed at this time.       Co-morbidities/Complexities (which will affect course of rehabilitation): ***  []None           Arthritic conditions   []Rheumatoid arthritis (M05.9)  []Osteoarthritis (M19.91)   Cardiovascular conditions   []Hypertension (I10)  []Hyperlipidemia (E78.5)  []Angina pectoris (I20)  []Atherosclerosis (I70)  []CVA Musculoskeletal conditions   []Disc pathology   []Congenital spine pathologies   []Prior surgical intervention  []Osteoporosis (M81.8)  []Osteopenia (M85.8)   Endocrine conditions   []Hypothyroid (E03.9)  []Hyperthyroid Gastrointestinal conditions   []Constipation (S65.84)   Metabolic conditions   []Morbid obesity (E66.01)  []Diabetes type 1(E10.65) or 2 (E11.65)   []Neuropathy (G60.9)     Pulmonary conditions   []Asthma (J45)  []Coughing   []COPD (J44.9)   Psychological Disorders  []Anxiety (F41.9)  []Depression (F32.9)   []Other:   []Other:           Barriers to/and or personal factors that will affect rehab potential:              []Age  []Sex    []Smoker              []Motivation/Lack of Motivation                        []Co-Morbidities              []Cognitive Function, education/learning barriers              []Environmental, home barriers              []profession/work barriers  []past PT/medical experience  []other:  Justification:     Falls Risk Assessment (30 days): ***  [x] Falls Risk assessed and no intervention required. [] Falls Risk assessed and Patient requires intervention due to being higher risk   TUG score (>12s at risk):     [] Falls education provided, including:         ASSESSMENT: ***  Functional Impairments:     []Noted lumbar/proximal hip hypomobility   []Noted lumbosacral and/or generalized hypermobility   []Decreased Lumbosacral/hip/LE functional ROM   []Decreased core/proximal hip strength and neuromuscular control    []Decreased LE functional strength    []Abnormal reflexes/sensation/myotomal/dermatomal deficits  []Reduced balance/proprioceptive control    []other:      Functional Activity Limitations (from functional questionnaire and intake)   []Reduced ability to tolerate prolonged functional positions   []Reduced ability or difficulty with changes of positions or transfers between positions   []Reduced ability to maintain good posture and demonstrate good body mechanics with sitting, bending, and lifting   []Reduced ability to sleep   [] Reduced ability or tolerance with driving and/or computer work   []Reduced ability to perform lifting, reaching, carrying tasks   []Reduced ability to squat   []Reduced ability to forward bend   []Reduced ability to ambulate prolonged functional periods/distances/surfaces   []Reduced ability to ascend/descend stairs   []other:       Participation Restrictions   []Reduced participation in self care activities   []Reduced participation in home management activities   []Reduced participation in work activities   []Reduced participation in social activities. []Reduced participation in sport/recreational activities. Classification:   []Signs/symptoms consistent with Lumbar instability/stabilization subgroup. []Signs/symptoms consistent with Lumbar mobilization/manipulation subgroup, myotomes and dermatomes intact. Meets manipulation criteria. []Signs/symptoms consistent with Lumbar direction specific/centralization subgroup   []Signs/symptoms consistent with Lumbar traction subgroup       []Signs/symptoms consistent with lumbar facet dysfunction   []Signs/symptoms consistent with lumbar stenosis type dysfunction   []Signs/symptoms consistent with nerve root involvement including myotome & dermatome dysfunction   []Signs/symptoms consistent with post-surgical status including: decreased ROM, strength and function. []signs/symptoms consistent with pathology which may benefit from Dry needling     []other:      Prognosis/Rehab Potential:      []Excellent   []Good    []Fair   []Poor    Tolerance of evaluation/treatment:    []Excellent   []Good    []Fair   []Poor     Physical Therapy Evaluation Complexity Justification  [x] A history of present problem with:  [] no personal factors and/or comorbidities that impact the plan of care;  []1-2 personal factors and/or comorbidities that impact the plan of care  []3 personal factors and/or comorbidities that impact the plan of care  [x] An examination of body systems using standardized tests and measures addressing any of the following: body structures and functions (impairments), activity limitations, and/or participation restrictions;:  [] a total of 1-2 or more elements   [] a total of 3 or more elements   [] a total of 4 or more elements   [x] A clinical presentation with:  [] stable and/or uncomplicated characteristics   [] evolving clinical presentation with changing characteristics  [] unstable and unpredictable characteristics;   [x] Clinical decision making of [] low, [] moderate, [] high complexity using standardized patient assessment instrument and/or measurable assessment of functional outcome.     [] EVAL (LOW) 06536 (typically 20 minutes face-to-face)  [] EVAL (MOD) 44676 (typically 30 minutes face-to-face)  [] EVAL (HIGH) 63542 (typically 45 minutes face-to-face)  [] RE-EVAL     PLAN: Begin PT focusing on: proximal hip mobilizations, LB mobs, LB core activation, proximal hip activation, and HEP    Frequency/Duration:  *** days per week for *** Weeks:  Interventions:  [x]  Therapeutic exercise including: strength training, ROM, for LE, Glutes and core   [x]  NMR activation and proprioception for glutes , LE and Core   [x]  Manual therapy as indicated for Hip complex, LE and spine to include: Dry Needling/IASTM, STM, PROM, Gr I-IV mobilizations, manipulation. [x]  Modalities as needed that may include: thermal agents, E-stim, Biofeedback, US, iontophoresis as indicated  [x]  Patient education on joint protection, postural re-education, activity modification, progression of HEP. HEP instruction: ***(see scanned forms)    GOALS:  Patient stated goal: ***  [] Progressing: [] Met: [] Not Met: [] Adjusted  Therapist goals for Patient:   Short Term Goals: To be achieved in: 2 weeks  1. Independent in HEP and progression per patient tolerance, in order to prevent re-injury. [] Progressing: [] Met: [] Not Met: [] Adjusted  2. Patient will have a decrease in pain to facilitate improvement in movement, function, and ADLs as indicated by Functional Deficits. [] Progressing: [] Met: [] Not Met: [] Adjusted    Long Term Goals: To be achieved in: *** weeks  1. Increase FOTO functional outcome score from *** to ***  to assist with reaching prior level of function. [] Progressing: [] Met: [] Not Met: [] Adjusted  2. Patient will demonstrate increased AROM to WNL, good LS mobility, good hip ROM to allow for proper joint functioning as indicated by patients Functional Deficits. [] Progressing: [] Met: [] Not Met: [] Adjusted  3.  Patient will demonstrate an increase in Strength to good proximal hip and core activation to allow for proper functional mobility as indicated by patients Functional Deficits. [] Progressing: [] Met: [] Not Met: [] Adjusted  4. Patient will return to *** functional activities without increased symptoms or restriction. [] Progressing: [] Met: [] Not Met: [] Adjusted  5. ***(patient specific functional goal)    [] Progressing: [] Met: [] Not Met: [] Adjusted     Electronically signed by:  Shannon Chappell PT, DPT        Note: If patient does not return for scheduled/recommended follow up visits, this note will serve as a discharge from care along with the most recent update on progress.

## 2023-03-02 NOTE — FLOWSHEET NOTE
[address] [facility] [place]. [address]  Phone: [phone]   Fax:     [phone]  Date:  2023    Patient Name:  Stephanie Clinton    :  1960  MRN: [id number]    Pertinent Medical History: Additional Pertinent Hx: Hypertension, depression    Medical/Treatment Diagnosis Information:  Medical Diagnosis: DDD (degenerative disc disease), lumbar [M51.36]  Musculoskeletal back pain [M54.9]  Treatment Diagnosis: Decreased functional mobility, impaired tolerance to prolonged ambulation    Insurance/Certification information:  PT Insurance Information: [facility]  Physician Information:  Katharine Garrison DO  Plan of care signed (Y/N): sent 2023    Date of Patient follow up with Physician:      Progress Report: []  Yes  [x]  No     Date Range for reporting period:  Beginning: 3/9/2023  Ending:     Progress report due (10 Rx/or 30 days whichever is less): 80     Recertification due (POC duration/ or 90 days whichever is less): ***     Visit # Insurance/POC Allowable Auth Needed   1 /*** *** []Yes    []No       Functional Scale:       Date Assessed: at eval  Test: FOTO  Score:     Pain level:  ***/10     History of Injury:    SUBJECTIVE:  See eval    OBJECTIVE:   Observation:   Test measurements:      RESTRICTIONS/PRECAUTIONS: ***    Exercises/Interventions:     Therapeutic Ex (83862)   Min: Resistance/Reps Notes/Cues                                                Manual Intervention (35283) Min:               NMR re-education (27870)   Min:     Mf Activation- re-ed     TrA Re-ed activation     Glute Max re-ed activation          Therapeutic Activity (31914) Min:               Modalities  Min:             Other Therapeutic Activities:  Pt was educated on PT POC, Diagnosis, Prognosis, pathomechanics as well as frequency and duration of scheduling future physical therapy appointments.  Time was also taken on this day to answer all patient questions and participation in PT. Reviewed appointment policy in detail with patient and patient verbalized understanding. Home Exercise Program:     Therapeutic Exercise and NMR EXR  [] (63224) Provided verbal/tactile cueing for activities related to strengthening, flexibility, endurance, ROM  for improvements in proximal hip and core control with self care, mobility, lifting and ambulation.  [] (96055) Provided verbal/tactile cueing for activities related to improving balance, coordination, kinesthetic sense, posture, motor skill, proprioception  to assist with core control in self care, mobility, lifting, and ambulation.      Therapeutic Activities:    [] (31435 or 69122) Provided verbal/tactile cueing for activities related to improving balance, coordination, kinesthetic sense, posture, motor skill, proprioception and motor activation to allow for proper function  with self care and ADLs  [] (88490) Provided training and instruction to the patient for proper core and proximal hip recruitment and positioning with ambulation re-education     Home Exercise Program:    [] (32568) Reviewed/Progressed HEP activities related to strengthening, flexibility, endurance, ROM of core, proximal hip and LE for functional self-care, mobility, lifting and ambulation   [] (36148) Reviewed/Progressed HEP activities related to improving balance, coordination, kinesthetic sense, posture, motor skill, proprioception of core, proximal hip and LE for self care, mobility, lifting, and ambulation      Manual Treatments:  PROM / STM / Oscillations-Mobs:  G-I, II, III, IV (PA's, Inf., Post.)  [] (90519) Provided manual therapy to mobilize proximal hip and LS spine soft tissue/joints for the purpose of modulating pain, promoting relaxation,  increasing ROM, reducing/eliminating soft tissue swelling/inflammation/restriction, improving soft tissue extensibility and allowing for proper ROM for normal function with self care, mobility, lifting and ambulation. Approval Dates:  CPT Code Units Approved Units Used  Date Updated:                     Charges:  Timed Code Treatment Minutes: ***   Total Treatment Minutes: ***     [] EVAL (LOW) 28397 (typically 20 minutes face-to-face)  [] EVAL (MOD) 27897 (typically 30 minutes face-to-face)  [] EVAL (HIGH) 50653 (typically 45 minutes face-to-face)  [] RE-EVAL     [] JR(23917) x     [] Dry needle 1 or 2 Muscles (86222)  [] NMR (08035) x     [] Dry needle 3+ Muscles (92892)  [] Manual (98370) x     [] Ultrasound (52416) x  [] TA (05647) x     [] Mech Traction (30836)  [] ES(attended) (91224)     [] ES (un) (92538):   [] Vasopump (38930) [] Ionto (71375)   [] Other:    If [facility] Please Indicate Time In/Out  CPT Code Time in Time out                                   Approval Dates:  CPT Code Units Approved Units Used  Date Updated:                     GOALS:*** (cut and paste from eval)    ASSESSMENT:  See eval    Treatment/Activity Tolerance:  [x] Patient tolerated treatment well [] Patient limited by fatique  [] Patient limited by pain  [] Patient limited by other medical complications  [] Other:     Overall Progression Towards Functional goals/ Treatment Progress Update:  [] Patient is progressing as expected towards functional goals listed. [] Progression is slowed due to complexities/Impairments listed. [] Progression has been slowed due to co-morbidities.   [x] Plan just implemented, too soon to assess goals progression <30days   [] Goals require adjustment due to lack of progress  [] Patient is not progressing as expected and requires additional follow up with physician  [] Other:    Prognosis for POC: [x] Good [] Fair  [] Poor    Patient requires continued skilled intervention: [x] Yes  [] No        PLAN: See eval  [] Continue per plan of care [] Alter current plan (see comments)  [x] Plan of care initiated [] Hold pending MD visit [] Discharge    Electronically signed by: Tonja Denney PT, DPT    Note: If patient does not return for scheduled/recommended follow up visits, this note will serve as a discharge from care along with the most recent update on progress.

## 2023-03-09 ENCOUNTER — HOSPITAL ENCOUNTER (OUTPATIENT)
Dept: PHYSICAL THERAPY | Age: 63
Setting detail: THERAPIES SERIES
Discharge: HOME | End: 2023-03-09

## 2023-03-21 ENCOUNTER — HOSPITAL ENCOUNTER (OUTPATIENT)
Dept: PHYSICAL THERAPY | Age: 63
Setting detail: THERAPIES SERIES
Discharge: HOME | End: 2023-03-21

## 2023-03-21 NOTE — FLOWSHEET NOTE
[address] [facility] [place].  [address]  Phone: [phone]   Fax:     [phone]    Physical Therapy  Cancellation/No-show Note  Patient Name:  Amador Colby  :  1960   Date:  2023  Cancelled visits to date: 1  No-shows to date: 0    Patient status for today's appointment patient:  [x]  Cancelled  []  Rescheduled appointment  []  No-show     Reason given by patient:  [x]  Patient ill  []  Conflicting appointment  []  No transportation    []  Conflict with work  []  No reason given  []  Other:     Comments:      Phone call information:   []  Phone call made today to patient at _ time at number provided:      []  Patient answered, conversation as follows:    []  Patient did not answer, message left as follows:  [x]  Phone call not made today; patient called to cancel    Electronically signed by:  Venancio Trinidad, PT, DPT

## 2023-04-24 ENCOUNTER — OFFICE VISIT (OUTPATIENT)
Dept: PRIMARY CARE CLINIC | Age: 63
End: 2023-04-24
Payer: COMMERCIAL

## 2023-04-24 VITALS
TEMPERATURE: 97.2 F | BODY MASS INDEX: 22.98 KG/M2 | DIASTOLIC BLOOD PRESSURE: 84 MMHG | WEIGHT: 142.4 LBS | SYSTOLIC BLOOD PRESSURE: 130 MMHG | OXYGEN SATURATION: 99 % | HEART RATE: 74 BPM

## 2023-04-24 DIAGNOSIS — F43.21 GRIEF AT LOSS OF CHILD: Primary | Chronic | ICD-10-CM

## 2023-04-24 DIAGNOSIS — M54.32 SCIATICA, LEFT SIDE: ICD-10-CM

## 2023-04-24 DIAGNOSIS — I10 ESSENTIAL HYPERTENSION: Chronic | ICD-10-CM

## 2023-04-24 DIAGNOSIS — M48.061 NEURAL FORAMINAL STENOSIS OF LUMBAR SPINE: Chronic | ICD-10-CM

## 2023-04-24 DIAGNOSIS — Z63.4 GRIEF AT LOSS OF CHILD: Primary | Chronic | ICD-10-CM

## 2023-04-24 DIAGNOSIS — F32.2 CURRENT SEVERE EPISODE OF MAJOR DEPRESSIVE DISORDER WITHOUT PSYCHOTIC FEATURES, UNSPECIFIED WHETHER RECURRENT (HCC): ICD-10-CM

## 2023-04-24 PROCEDURE — 99214 OFFICE O/P EST MOD 30 MIN: CPT | Performed by: FAMILY MEDICINE

## 2023-04-24 PROCEDURE — 3074F SYST BP LT 130 MM HG: CPT | Performed by: FAMILY MEDICINE

## 2023-04-24 PROCEDURE — 3078F DIAST BP <80 MM HG: CPT | Performed by: FAMILY MEDICINE

## 2023-04-24 RX ORDER — ACETAMINOPHEN 500 MG
1000 TABLET ORAL 3 TIMES DAILY
Qty: 180 TABLET | Refills: 5 | Status: SHIPPED | OUTPATIENT
Start: 2023-04-24

## 2023-04-24 RX ORDER — GABAPENTIN 300 MG/1
300 CAPSULE ORAL 3 TIMES DAILY
Qty: 90 CAPSULE | Refills: 5 | Status: SHIPPED | OUTPATIENT
Start: 2023-04-24 | End: 2024-04-23

## 2023-04-24 SDOH — SOCIAL STABILITY - SOCIAL INSECURITY: DISSAPEARANCE AND DEATH OF FAMILY MEMBER: Z63.4

## 2023-04-24 ASSESSMENT — ENCOUNTER SYMPTOMS
NAUSEA: 0
SHORTNESS OF BREATH: 0
DIARRHEA: 0
CONSTIPATION: 0
COUGH: 0
VOMITING: 0
BACK PAIN: 1

## 2023-04-24 NOTE — PROGRESS NOTES
Harvey Howard (:  1960) is a 66 y.o. female,Established patient, here for evaluation of the following chief complaint(s):  Back Pain      SUBJECTIVE:  23:    Daughter passed away one year ago in 2022, patient was the one who found her daughter  of a heart attack. Daughter of patient was young, and it was a sudden unexpected death. Pt actively grieving in room, tearful, and had a difficult time composing herself for the rest of her visit. -- never went to grief counseling, did not ever want to go to group therapy. Will refer her to [facility] so she can have therapy and counseling. Back pain -- has not yet started physical therapy, has appt on 23 to begin. Previously had to cancel her other appt for PT for the pinched nerve in her back. Blood pressure -- improved since medication was restarted. Continue olmesartan-hctz    **Needs to do FIT test**        2.10.23: Back pain -- Had pinched nerve from working at [employer]. Has back issues since that time. Has had to use a cane for at least 4-5 years since it occurred. Did have physical therapy and cortisone shots, but it does not fully ever go away. Hypertension -- has been taking her one medication for a few months now, and it has been relatively controlled     Last mammogram was done in 2023 -- right breast had spot that is totally benign, next mammogram in     156/91 blood pressure -- has been out of blood pressure meds x 1 mo  -- will check labs and check blood pressure again after starting meds up again    Back Pain  Pertinent negatives include no fever. I have reviewed the chart notes available from myself and other providers.  I have reviewed and addressed all active problems and created or updated the problems list in detail, as needed    I have extensively reviewed and reconciled the medication list, discontinued medications not taking or no longer appropriate, and updated

## 2023-04-25 NOTE — PLAN OF CARE
[facility] [place]. [address]  Phone: [phone]   Fax:     [phone]                                                      Physical Therapy Certification    Dear Kellie Lagos DO,    We had the pleasure of evaluating the following patient for physical therapy services at [facility]. A summary of our findings can be found in the initial assessment below. This includes our plan of care. If you have any questions or concerns regarding these findings, please do not hesitate to contact me at the office phone number checked above. Thank you for the referral.       Physician Signature:_______________________________Date:__________________  By signing above (or electronic signature), therapists plan is approved by physician      Patient: Wilmer Reis   : 1960   MRN: [id number]  Referring Physician: Kellie Lagos DO      Evaluation Date: 2023      Medical Diagnosis Information:  Medical Diagnosis: Other intervertebral disc degeneration, lumbar region [M51.36]  Dorsalgia, unspecified [M54.9]   Treatment Diagnosis: Decreased functional mobility, impaired tolerance to prolonged ambulation                                       Insurance information: PT Insurance Information: BCBS      Precautions/ Contra-indications:   Latex Allergy:  [x]NO      []YES  Preferred Language for Healthcare:   [x]English       []other:    C-SSRS Triggered by Intake questionnaire (Past 2 wk assessment ):   [x] No, Questionnaire did not trigger screening.   [] Yes, Patient intake triggered C-SSRS Screening      [] C-SSRS Screening completed  [] PCP notified via Epic     SUBJECTIVE: Patient is a 67 y/o female with a hx  of lbp since injuring her back 3 or 4 years ago and has had problems since. She had numbness in her legs then but not now.   She c/o wakes most nights

## 2023-04-26 PROBLEM — S83.241A ACUTE MEDIAL MENISCAL TEAR, RIGHT, INITIAL ENCOUNTER: Status: RESOLVED | Noted: 2018-01-10 | Resolved: 2023-04-26

## 2023-04-26 PROBLEM — S39.012A LUMBAR STRAIN: Status: RESOLVED | Noted: 2017-06-26 | Resolved: 2023-04-26

## 2023-04-27 ENCOUNTER — HOSPITAL ENCOUNTER (OUTPATIENT)
Dept: PHYSICAL THERAPY | Age: 63
Setting detail: THERAPIES SERIES
Discharge: HOME | End: 2023-04-27
Payer: COMMERCIAL

## 2023-04-27 PROCEDURE — 97161 PT EVAL LOW COMPLEX 20 MIN: CPT

## 2023-04-27 PROCEDURE — 97110 THERAPEUTIC EXERCISES: CPT

## 2023-04-27 PROCEDURE — 97530 THERAPEUTIC ACTIVITIES: CPT

## 2023-04-27 NOTE — FLOWSHEET NOTE
[address] [facility] [place]. [address]  Phone: [phone]   Fax:     [phone]  Date:  2023    Patient Name:  Verner Freer    :  1960  MRN: [id number]    Pertinent Medical History: Additional Pertinent Hx: Hypertension, depression, sciatica, low back pain    Medical/Treatment Diagnosis Information:  Medical Diagnosis: Other intervertebral disc degeneration, lumbar region [M51.36]  Dorsalgia, unspecified [M54.9]  Treatment Diagnosis: Decreased functional mobility, impaired tolerance to prolonged ambulation    Insurance/Certification information:  PT Insurance Information: [facility]  Physician Information:  Ashley Valdes DO  Plan of care signed (Y/N): routed    Date of Patient follow up with Physician:      Progress Report: []  Yes  [x]  No     Date Range for reporting period:  Beginnin2023  Ending:     Progress report due (10 Rx/or 30 days whichever is less):      Recertification due (POC duration/ or 90 days whichever is less):      Visit # Insurance/POC Allowable Auth Needed     []Yes    []No       Functional Scale:         Test: Modified Oswestry  Date Assessed Score               Pain level:  8/10     History of Injury: Patient is a 65 y/o female with a hx  of lbp since injuring her back 3 or 4 years ago and has had problems since. She had numbness in her legs then but not now. She c/o wakes most nights and can't sleep. She denies n+t, saddle anesthesia, and bowel or bladder problems. She says bending and lifting are the worse thing she can do. She uses a cane to walk. She hopes to have less pain and increase activities. She is not working at this time. She sits in her recliner a lot during the day.       SUBJECTIVE: Pt states, \" I have pain all the time, \"    OBJECTIVE:       ROM   Comments   Lumbar Flex 20 Very afraid to move   Lumbar Ext 0        ROM

## 2023-05-05 ENCOUNTER — APPOINTMENT (OUTPATIENT)
Dept: PHYSICAL THERAPY | Age: 63
End: 2023-05-05
Payer: COMMERCIAL

## 2023-05-10 ENCOUNTER — HOSPITAL ENCOUNTER (OUTPATIENT)
Dept: PHYSICAL THERAPY | Age: 63
Setting detail: THERAPIES SERIES
Discharge: HOME | End: 2023-05-10
Payer: COMMERCIAL

## 2023-05-10 PROCEDURE — 97110 THERAPEUTIC EXERCISES: CPT

## 2023-05-10 PROCEDURE — 97140 MANUAL THERAPY 1/> REGIONS: CPT

## 2023-05-10 NOTE — FLOWSHEET NOTE
[facility] [place]. [address]  Phone: [phone]   Fax:     [phone]  Date:  05/10/2023    Patient Name:  Qing James    :  1960  MRN: [id number]    Pertinent Medical History: Additional Pertinent Hx: Hypertension, depression, sciatica, low back pain    Medical/Treatment Diagnosis Information:  Medical Diagnosis: Other intervertebral disc degeneration, lumbar region [M51.36]  Dorsalgia, unspecified [M54.9]  Treatment Diagnosis: Decreased functional mobility, impaired tolerance to prolonged ambulation    Insurance/Certification information:  PT Insurance Information: BCBS  Physician Information:  Saumya Hull DO  Plan of care signed (Y/N): routed    Date of Patient follow up with Physician:      Progress Report: []  Yes  [x]  No     Date Range for reporting period:  Beginnin/10/2023  Ending:     Progress report due (10 Rx/or 30 days whichever is less):      Recertification due (POC duration/ or 90 days whichever is less):      Visit # Insurance/POC Allowable Auth Needed     []Yes    []No       Functional Scale:         Test: Modified Oswestry  Date Assessed Score               Pain level:  8/10     History of Injury: Patient is a 67 y/o female with a hx  of lbp since injuring her back 3 or 4 years ago and has had problems since. She had numbness in her legs then but not now. She c/o wakes most nights and can't sleep. She denies n+t, saddle anesthesia, and bowel or bladder problems. She says bending and lifting are the worse thing she can do. She uses a cane to walk. She hopes to have less pain and increase activities. She is not working at this time. She sits in her recliner a lot during the day.       SUBJECTIVE: Pt states, \" I have pain all the time, \"  5/10/23  Pt states, \" Pain is off and on \"    OBJECTIVE:       ROM   Comments   Lumbar Flex 20

## 2023-05-17 ENCOUNTER — HOSPITAL ENCOUNTER (OUTPATIENT)
Dept: PHYSICAL THERAPY | Age: 63
Setting detail: THERAPIES SERIES
Discharge: HOME | End: 2023-05-17
Payer: COMMERCIAL

## 2023-05-17 PROCEDURE — 97140 MANUAL THERAPY 1/> REGIONS: CPT

## 2023-05-17 PROCEDURE — 97110 THERAPEUTIC EXERCISES: CPT

## 2023-05-17 PROCEDURE — 97112 NEUROMUSCULAR REEDUCATION: CPT

## 2023-05-17 NOTE — FLOWSHEET NOTE
[address] [facility] [place]. [address] [address]  Phone: [phone]   Fax:     [phone]  Date:  2023    Patient Name:  Rylee Matias    :  1960  MRN: [id number]    Pertinent Medical History: Additional Pertinent Hx: Hypertension, depression, sciatica, low back pain    Medical/Treatment Diagnosis Information:  Medical Diagnosis: Other intervertebral disc degeneration, lumbar region [M51.36]  Dorsalgia, unspecified [M54.9]  Treatment Diagnosis: Decreased functional mobility, impaired tolerance to prolonged ambulation    Insurance/Certification information:  PT Insurance Information: BCBS  Physician Information:  Jai Saldaña DO  Plan of care signed (Y/N): routed    Date of Patient follow up with Physician:      Progress Report: []  Yes  [x]  No     Date Range for reporting period:  Beginnin2023  Ending:     Progress report due (10 Rx/or 30 days whichever is less):      Recertification due (POC duration/ or 90 days whichever is less):      Visit # Insurance/POC Allowable Auth Needed   3 /12  []Yes    []No       Functional Scale:         Test: Modified Oswestry  Date Assessed Score               Pain level: 4/10     History of Injury: Patient is a 67 y/o female with a hx  of lbp since injuring her back 3 or 4 years ago and has had problems since. She had numbness in her legs then but not now. She c/o wakes most nights and can't sleep. She denies n+t, saddle anesthesia, and bowel or bladder problems. She says bending and lifting are the worse thing she can do. She uses a cane to walk. She hopes to have less pain and increase activities. She is not working at this time. She sits in her recliner a lot during the day.       SUBJECTIVE: Pt states, \" I have pain all the time, \"  5/10/23  Pt states, \" Pain is off and on \"  23  Pt states, \" I felt much better after the

## 2023-05-24 ENCOUNTER — HOSPITAL ENCOUNTER (OUTPATIENT)
Dept: PHYSICAL THERAPY | Age: 63
Setting detail: THERAPIES SERIES
Discharge: HOME | End: 2023-05-24
Payer: COMMERCIAL

## 2023-05-24 PROCEDURE — 97140 MANUAL THERAPY 1/> REGIONS: CPT

## 2023-05-24 PROCEDURE — 97112 NEUROMUSCULAR REEDUCATION: CPT

## 2023-05-24 PROCEDURE — 97110 THERAPEUTIC EXERCISES: CPT

## 2023-05-24 NOTE — FLOWSHEET NOTE
[address] [facility] [place]. [address] [address]  Phone: [phone]   Fax:     [phone]  Date:  2023    Patient Name:  Gerber Pizano    :  1960  MRN: [id number]    Pertinent Medical History: Additional Pertinent Hx: Hypertension, depression, sciatica, low back pain    Medical/Treatment Diagnosis Information:  Medical Diagnosis: Other intervertebral disc degeneration, lumbar region [M51.36]  Dorsalgia, unspecified [M54.9]  Treatment Diagnosis: Decreased functional mobility, impaired tolerance to prolonged ambulation    Insurance/Certification information:  PT Insurance Information: [facility]  Physician Information:  Jose Beal DO  Plan of care signed (Y/N): routed    Date of Patient follow up with Physician:      Progress Report: []  Yes  [x]  No     Date Range for reporting period:  Beginnin2023  Ending:     Progress report due (10 Rx/or 30 days whichever is less):      Recertification due (POC duration/ or 90 days whichever is less):      Visit # Insurance/POC Allowable Auth Needed     []Yes    []No       Functional Scale:         Test: Modified Oswestry  Date Assessed Score               Pain level: 6/10     History of Injury: Patient is a 67 y/o female with a hx  of lbp since injuring her back 3 or 4 years ago and has had problems since. She had numbness in her legs then but not now. She c/o wakes most nights and can't sleep. She denies n+t, saddle anesthesia, and bowel or bladder problems. She says bending and lifting are the worse thing she can do. She uses a cane to walk. She hopes to have less pain and increase activities. She is not working at this time. She sits in her recliner a lot during the day.       SUBJECTIVE: Pt states, \" I have pain all the time, \"  5/10/23  Pt states, \" Pain is off and on \"  23  Pt states, \" I felt much better after the

## 2023-06-07 ENCOUNTER — HOSPITAL ENCOUNTER (OUTPATIENT)
Dept: PHYSICAL THERAPY | Age: 63
Setting detail: THERAPIES SERIES
Discharge: HOME | End: 2023-06-07
Payer: COMMERCIAL

## 2023-06-07 PROCEDURE — 97110 THERAPEUTIC EXERCISES: CPT

## 2023-06-07 PROCEDURE — 97112 NEUROMUSCULAR REEDUCATION: CPT

## 2023-06-07 PROCEDURE — 97140 MANUAL THERAPY 1/> REGIONS: CPT

## 2023-06-07 NOTE — FLOWSHEET NOTE
[address] [facility]. [address] [address]  Phone: [phone]   Fax:     [phone]  Date:  2023    Patient Name:  Pawel Temple    :  1960  MRN: [id number]    Pertinent Medical History: Additional Pertinent Hx: Hypertension, depression, sciatica, low back pain    Medical/Treatment Diagnosis Information:  Medical Diagnosis: Other intervertebral disc degeneration, lumbar region [M51.36]  Dorsalgia, unspecified [M54.9]  Treatment Diagnosis: Decreased functional mobility, impaired tolerance to prolonged ambulation    Insurance/Certification information:  PT Insurance Information: [facility]  Physician Information:  Sharifa López DO  Plan of care signed (Y/N): routed    Date of Patient follow up with Physician:      Progress Report: []  Yes  [x]  No     Date Range for reporting period:  Beginnin2023  Ending:     Progress report due (10 Rx/or 30 days whichever is less): 3/94/15     Recertification due (POC duration/ or 90 days whichever is less):      Visit # Insurance/POC Allowable Auth Needed     []Yes    []No       Functional Scale:         Test: Modified Oswestry  Date Assessed Score               Pain level: 6/10     History of Injury: Patient is a 65 y/o female with a hx  of lbp since injuring her back 3 or 4 years ago and has had problems since. She had numbness in her legs then but not now. She c/o wakes most nights and can't sleep. She denies n+t, saddle anesthesia, and bowel or bladder problems. She says bending and lifting are the worse thing she can do. She uses a cane to walk. She hopes to have less pain and increase activities. She is not working at this time. She sits in her recliner a lot during the day.       SUBJECTIVE: Pt states, \" I have pain all the time, \"  5/10/23  Pt states, \" Pain is off and on \"  23  Pt states, \" I felt much better after the last

## 2023-06-21 ENCOUNTER — HOSPITAL ENCOUNTER (OUTPATIENT)
Dept: PHYSICAL THERAPY | Age: 63
Setting detail: THERAPIES SERIES
Discharge: HOME | End: 2023-06-21
Payer: COMMERCIAL

## 2023-06-21 NOTE — FLOWSHEET NOTE
[address] [facility] [place].  [place], [place] [address] [address]  Phone: [phone]   Fax:     [phone]    Physical Therapy  Cancellation/No-show Note  Patient Name:  Mera Syed  :  1960   Date:  2023  Cancelled visits to date: 1  No-shows to date: 0    Patient status for today's appointment patient:  [x]  Cancelled  []  Rescheduled appointment  []  No-show     Reason given by patient:  [x]  Patient ill  []  Conflicting appointment  []  No transportation    []  Conflict with work  []  No reason given  []  Other:     Comments:      Phone call information:   []  Phone call made today to patient at _ time at number provided:      []  Patient answered, conversation as follows:    []  Patient did not answer, message left as follows:  []  Phone call not made today    Electronically signed by:  Kasia Morley, PT

## 2023-06-23 ENCOUNTER — HOSPITAL ENCOUNTER (OUTPATIENT)
Dept: PHYSICAL THERAPY | Age: 63
Setting detail: THERAPIES SERIES
End: 2023-06-23
Payer: COMMERCIAL

## 2023-07-14 ENCOUNTER — HOSPITAL ENCOUNTER (OUTPATIENT)
Dept: PHYSICAL THERAPY | Age: 63
Setting detail: THERAPIES SERIES
Discharge: HOME | End: 2023-07-14
Payer: COMMERCIAL

## 2023-07-21 ENCOUNTER — HOSPITAL ENCOUNTER (OUTPATIENT)
Dept: PHYSICAL THERAPY | Age: 63
Setting detail: THERAPIES SERIES
Discharge: HOME | End: 2023-07-21
Payer: COMMERCIAL

## 2023-07-21 PROCEDURE — 97110 THERAPEUTIC EXERCISES: CPT

## 2023-07-21 NOTE — FLOWSHEET NOTE
PLAN: Lumbar rom, strength, myofascial release, muscle enregy technique, joint mobs  le stretches, ns exercises, hep, modalities as needed, work on mobility, stab, jnt mobs, mfr to lumbar sirena. Poor overall facet mobility. , very tight lumbar sirena. She is afraid to move    [] Continue per plan of care [] Alter current plan (see comments)  [x] Plan of care initiated [] Hold pending MD visit [] Discharge    Electronically signed by: Alcides Kaur, PT    Note: If patient does not return for scheduled/recommended follow up visits, this note will serve as a discharge from care along with the most recent update on progress.

## 2023-07-28 ENCOUNTER — HOSPITAL ENCOUNTER (OUTPATIENT)
Dept: PHYSICAL THERAPY | Age: 63
Setting detail: THERAPIES SERIES
Discharge: HOME | End: 2023-07-28
Payer: COMMERCIAL

## 2023-07-28 NOTE — FLOWSHEET NOTE
[address] [address]  Phone: [phone]   Fax:     [phone]    Physical Therapy  Cancellation/No-show Note  Patient Name:  Bubba Henriquez  :  1960   Date:  2023  Cancelled visits to date: 2  No-shows to date: 1    Patient status for today's appointment patient:  [x]  Cancelled  []  Rescheduled appointment  []  No-show     Reason given by patient:  []  Patient ill  []  Conflicting appointment  []  No transportation    []  Conflict with work  []  No reason given  []  Other:     Comments:      Phone call information:   []  Phone call made today to patient at _ time at number provided:      []  Patient answered, conversation as follows:    [x]  Patient did not answer, message left as follows:  []  Phone call not made today    Electronically signed by:  Chelle Appiah PT

## 2023-08-11 ENCOUNTER — HOSPITAL ENCOUNTER (OUTPATIENT)
Dept: PHYSICAL THERAPY | Age: 63
Setting detail: THERAPIES SERIES
Discharge: HOME | End: 2023-08-11
Payer: COMMERCIAL

## 2023-08-11 PROCEDURE — 97112 NEUROMUSCULAR REEDUCATION: CPT

## 2023-08-11 PROCEDURE — 97110 THERAPEUTIC EXERCISES: CPT

## 2023-08-11 PROCEDURE — 97140 MANUAL THERAPY 1/> REGIONS: CPT

## 2023-08-11 NOTE — PLAN OF CARE
require adjustment due to lack of progress  [] Patient is not progressing as expected and requires additional follow up with physician  [] Other:    Prognosis for POC: [x] Good [] Fair  [] Poor    Patient requires continued skilled intervention: [x] Yes  [] No        PLAN: Lumbar rom, strength, myofascial release, muscle enregy technique, joint mobs  le stretches, ns exercises, hep, modalities as needed, work on mobility, stab, jnt mobs, mfr to lumbar sirena. Poor overall facet mobility. , very tight lumbar sirena. She is afraid to move  8/11/23  Pt is showing progress, she has been inconsistent with attendance due to transportation. Continue PT, increase dynamic strength, functional activities    [] Continue per plan of care [] Alter current plan (see comments)  [x] Plan of care initiated [] Hold pending MD visit [] Discharge    Electronically signed by: Nathalia Montesinos, PT    Note: If patient does not return for scheduled/recommended follow up visits, this note will serve as a discharge from care along with the most recent update on progress.

## 2023-09-06 ENCOUNTER — HOSPITAL ENCOUNTER (OUTPATIENT)
Dept: PHYSICAL THERAPY | Age: 63
Setting detail: THERAPIES SERIES
Discharge: HOME | End: 2023-09-06

## 2023-09-06 NOTE — FLOWSHEET NOTE
[address] [address]  Phone: [phone]   Fax:     [phone]    Physical Therapy  Cancellation/No-show Note  Patient Name:  Sacha Ahmadi  :  1960  Date:  2023  Cancelled visits to date: 3  No-shows to date: 1    Patient status for today's appointment patient:  [x]  Cancelled  []  Rescheduled appointment  []  No-show     Reason given by patient:  []  Patient ill  []  Conflicting appointment  []  No transportation    []  Conflict with work  []  No reason given  []  Other:     Comments:      Phone call information:   []  Phone call made today to patient at _ time at number provided:      []  Patient answered, conversation as follows:    [x]  Patient did not answer, message left as follows:  []  Phone call not made today    Electronically signed by:  Lupe Flores PT

## 2023-09-10 ENCOUNTER — HOSPITAL ENCOUNTER (EMERGENCY)
Age: 63
Discharge: HOME | End: 2023-09-10
Payer: COMMERCIAL

## 2023-09-10 VITALS
SYSTOLIC BLOOD PRESSURE: 148 MMHG | TEMPERATURE: 98.1 F | HEART RATE: 74 BPM | BODY MASS INDEX: 21.72 KG/M2 | WEIGHT: 135.14 LBS | DIASTOLIC BLOOD PRESSURE: 80 MMHG | HEIGHT: 66 IN | RESPIRATION RATE: 17 BRPM | OXYGEN SATURATION: 98 %

## 2023-09-10 DIAGNOSIS — S00.81XA ABRASION OF FACE, INITIAL ENCOUNTER: ICD-10-CM

## 2023-09-10 DIAGNOSIS — S05.02XA CORNEAL ABRASION, LEFT, INITIAL ENCOUNTER: Primary | ICD-10-CM

## 2023-09-10 PROCEDURE — 99283 EMERGENCY DEPT VISIT LOW MDM: CPT

## 2023-09-10 RX ORDER — TETRACAINE HYDROCHLORIDE 5 MG/ML
2 SOLUTION OPHTHALMIC ONCE
Status: DISCONTINUED | OUTPATIENT
Start: 2023-09-10 | End: 2023-09-10 | Stop reason: HOSPADM

## 2023-09-10 RX ORDER — ERYTHROMYCIN 5 MG/G
OINTMENT OPHTHALMIC
Qty: 3.5 G | Refills: 0 | Status: SHIPPED | OUTPATIENT
Start: 2023-09-10

## 2023-09-10 ASSESSMENT — VISUAL ACUITY
OU: 20/15
OD: 20/20
OS: 20/70

## 2023-09-10 ASSESSMENT — PAIN - FUNCTIONAL ASSESSMENT: PAIN_FUNCTIONAL_ASSESSMENT: NONE - DENIES PAIN
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
## 2023-09-11 ENCOUNTER — TELEPHONE (OUTPATIENT)
Dept: PRIMARY CARE CLINIC | Age: 63
End: 2023-09-11
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
## 2023-09-13 ENCOUNTER — HOSPITAL ENCOUNTER (OUTPATIENT)
Dept: PHYSICAL THERAPY | Age: 63
Setting detail: THERAPIES SERIES
Discharge: HOME | End: 2023-09-13
Payer: COMMERCIAL

## 2023-09-13 PROCEDURE — 97110 THERAPEUTIC EXERCISES: CPT

## 2023-09-13 PROCEDURE — 97140 MANUAL THERAPY 1/> REGIONS: CPT

## 2023-09-13 NOTE — PLAN OF CARE
do more around the house and walk more \"  [x] Progressing: [] Met: [] Not Met: [] Adjusted              ASSESSMENT:  See eval    Treatment/Activity Tolerance:  [x] Patient tolerated treatment well [] Patient limited by fatique  [] Patient limited by pain  [] Patient limited by other medical complications  [] Other:     Overall Progression Towards Functional goals/ Treatment Progress Update:  [] Patient is progressing as expected towards functional goals listed. [] Progression is slowed due to complexities/Impairments listed. [] Progression has been slowed due to co-morbidities. [x] Plan just implemented, too soon to assess goals progression <30days   [] Goals require adjustment due to lack of progress  [] Patient is not progressing as expected and requires additional follow up with physician  [] Other:    Prognosis for POC: [x] Good [] Fair  [] Poor    Patient requires continued skilled intervention: [x] Yes  [] No        PLAN: Lumbar rom, strength, myofascial release, muscle enregy technique, joint mobs  le stretches, ns exercises, hep, modalities as needed, work on mobility, stab, jnt mobs, mfr to lumbar sirena. Poor overall facet mobility. , very tight lumbar sirena. She is afraid to move  8/11/23  Pt is showing progress, she has been inconsistent with attendance due to transportation. Continue PT, increase dynamic strength, functional activities    [] Continue per plan of care [] Alter current plan (see comments)  [x] Plan of care initiated [] Hold pending MD visit [] Discharge    Electronically signed by: Mariela Rodriguez PT    Note: If patient does not return for scheduled/recommended follow up visits, this note will serve as a discharge from care along with the most recent update on progress.

## 2023-09-19 ENCOUNTER — OFFICE VISIT (OUTPATIENT)
Dept: PRIMARY CARE CLINIC | Age: 63
End: 2023-09-19
Payer: COMMERCIAL

## 2023-09-19 VITALS
OXYGEN SATURATION: 99 % | HEIGHT: 66 IN | HEART RATE: 57 BPM | DIASTOLIC BLOOD PRESSURE: 87 MMHG | WEIGHT: 140 LBS | TEMPERATURE: 97.9 F | BODY MASS INDEX: 22.5 KG/M2 | SYSTOLIC BLOOD PRESSURE: 137 MMHG

## 2023-09-19 DIAGNOSIS — M48.061 NEURAL FORAMINAL STENOSIS OF LUMBAR SPINE: Chronic | ICD-10-CM

## 2023-09-19 DIAGNOSIS — M54.32 SCIATICA, LEFT SIDE: ICD-10-CM

## 2023-09-19 DIAGNOSIS — E55.9 VITAMIN D DEFICIENCY: ICD-10-CM

## 2023-09-19 DIAGNOSIS — I10 ESSENTIAL HYPERTENSION: ICD-10-CM

## 2023-09-19 PROCEDURE — 3079F DIAST BP 80-89 MM HG: CPT | Performed by: INTERNAL MEDICINE

## 2023-09-19 PROCEDURE — 3075F SYST BP GE 130 - 139MM HG: CPT | Performed by: INTERNAL MEDICINE

## 2023-09-19 PROCEDURE — 99214 OFFICE O/P EST MOD 30 MIN: CPT | Performed by: INTERNAL MEDICINE

## 2023-09-19 RX ORDER — OLMESARTAN MEDOXOMIL AND HYDROCHLOROTHIAZIDE 40/12.5 40; 12.5 MG/1; MG/1
1 TABLET ORAL DAILY
Qty: 90 TABLET | Refills: 3 | Status: SHIPPED | OUTPATIENT
Start: 2023-09-19

## 2023-09-19 RX ORDER — ACETAMINOPHEN 500 MG
1000 TABLET ORAL 3 TIMES DAILY
Qty: 180 TABLET | Refills: 5 | Status: SHIPPED | OUTPATIENT
Start: 2023-09-19

## 2023-09-19 RX ORDER — PREDNISONE 10 MG/1
TABLET ORAL
Qty: 53 TABLET | Refills: 0 | Status: SHIPPED | OUTPATIENT
Start: 2023-09-19 | End: 2023-09-29

## 2023-09-19 ASSESSMENT — ENCOUNTER SYMPTOMS
RESPIRATORY NEGATIVE: 1
GASTROINTESTINAL NEGATIVE: 1
EYES NEGATIVE: 1
BACK PAIN: 1
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
## 2023-09-20 ENCOUNTER — HOSPITAL ENCOUNTER (OUTPATIENT)
Dept: PHYSICAL THERAPY | Age: 63
Setting detail: THERAPIES SERIES
Discharge: HOME | End: 2023-09-20
Payer: COMMERCIAL

## 2023-09-20 NOTE — FLOWSHEET NOTE
[address] [address]  Phone: [phone]   Fax:     [phone]    Physical Therapy  Cancellation/No-show Note  Patient Name:  Reyes Miranda  :  1960  Date:  2023  Cancelled visits to date: 4  No-shows to date: 1    Patient status for today's appointment patient:  [x]  Cancelled  []  Rescheduled appointment  []  No-show     Reason given by patient:  []  Patient ill  []  Conflicting appointment  []  No transportation    []  Conflict with work  []  No reason given  []  Other:     Comments:      Phone call information:   []  Phone call made today to patient at _ time at number provided:      []  Patient answered, conversation as follows:    [x]  Patient did not answer, message left as follows:  []  Phone call not made today    Electronically signed by:  Faraz Ortiz PT

## 2023-09-27 ENCOUNTER — HOSPITAL ENCOUNTER (OUTPATIENT)
Dept: PHYSICAL THERAPY | Age: 63
Setting detail: THERAPIES SERIES
End: 2023-09-27
Payer: COMMERCIAL

## 2023-10-03 ENCOUNTER — HOSPITAL ENCOUNTER (OUTPATIENT)
Dept: PHYSICAL THERAPY | Age: 63
Setting detail: THERAPIES SERIES
Discharge: HOME | End: 2023-10-03
Payer: COMMERCIAL

## 2023-10-03 PROCEDURE — 97112 NEUROMUSCULAR REEDUCATION: CPT

## 2023-10-03 PROCEDURE — 97140 MANUAL THERAPY 1/> REGIONS: CPT

## 2023-10-03 PROCEDURE — 97110 THERAPEUTIC EXERCISES: CPT

## 2023-10-03 NOTE — FLOWSHEET NOTE
reps    Therapeutic Exercise and NMR EXR  [x] (73292) Provided verbal/tactile cueing for activities related to strengthening, flexibility, endurance, ROM  for improvements in proximal hip and core control with self care, mobility, lifting and ambulation. [x] (35236) Provided verbal/tactile cueing for activities related to improving balance, coordination, kinesthetic sense, posture, motor skill, proprioception  to assist with core control in self care, mobility, lifting, and ambulation. Therapeutic Activities:    [x] (39479 or 12370) Provided verbal/tactile cueing for activities related to improving balance, coordination, kinesthetic sense, posture, motor skill, proprioception and motor activation to allow for proper function  with self care and ADLs  [x] (18794) Provided training and instruction to the patient for proper core and proximal hip recruitment and positioning with ambulation re-education     Home Exercise Program:    [x] (04181) Reviewed/Progressed HEP activities related to strengthening, flexibility, endurance, ROM of core, proximal hip and LE for functional self-care, mobility, lifting and ambulation   [x] (50216) Reviewed/Progressed HEP activities related to improving balance, coordination, kinesthetic sense, posture, motor skill, proprioception of core, proximal hip and LE for self care, mobility, lifting, and ambulation      Manual Treatments:  PROM / STM / Oscillations-Mobs:  G-I, II, III, IV (PA's, Inf., Post.)  [x] (19253) Provided manual therapy to mobilize proximal hip and LS spine soft tissue/joints for the purpose of modulating pain, promoting relaxation,  increasing ROM, reducing/eliminating soft tissue swelling/inflammation/restriction, improving soft tissue extensibility and allowing for proper ROM for normal function with self care, mobility, lifting and ambulation.      Approval Dates:  CPT Code Units Approved Units Used  Date Updated:                     Charges:  Timed Code

## 2023-11-28 ENCOUNTER — TELEPHONE (OUTPATIENT)
Dept: PRIMARY CARE CLINIC | Age: 63
End: 2023-11-28

## 2023-11-28 NOTE — TELEPHONE ENCOUNTER
Spoke with patient and advised her to get Covid test and call office back.----- Message from Marilee Mobley sent at 11/27/2023 10:34 AM EST -----  Subject: Appointment Request    Reason for Call: Established Patient Appointment needed: Semi-Routine   Cough, Cold Symptoms    QUESTIONS    Reason for appointment request? Requested Provider unavailable - Dr. Galvin     Additional Information for Provider? Pt is requesting an appt with Dr. Galvin for a cold and a check up. She would need a few days in advance for   transportation. Please advise.   ---------------------------------------------------------------------------  --------------  CALL BACK INFO  [phone]; OK to leave message on voicemail  ---------------------------------------------------------------------------  --------------  SCRIPT ANSWERS

## 2024-01-08 ENCOUNTER — OFFICE VISIT (OUTPATIENT)
Dept: PRIMARY CARE CLINIC | Age: 64
End: 2024-01-08
Payer: COMMERCIAL

## 2024-01-08 VITALS
SYSTOLIC BLOOD PRESSURE: 120 MMHG | HEART RATE: 60 BPM | BODY MASS INDEX: 22.31 KG/M2 | TEMPERATURE: 97.3 F | WEIGHT: 138.8 LBS | OXYGEN SATURATION: 98 % | DIASTOLIC BLOOD PRESSURE: 85 MMHG | HEIGHT: 66 IN

## 2024-01-08 DIAGNOSIS — F32.2 CURRENT SEVERE EPISODE OF MAJOR DEPRESSIVE DISORDER WITHOUT PSYCHOTIC FEATURES, UNSPECIFIED WHETHER RECURRENT (HCC): ICD-10-CM

## 2024-01-08 DIAGNOSIS — M54.32 SCIATICA, LEFT SIDE: ICD-10-CM

## 2024-01-08 DIAGNOSIS — M48.061 NEURAL FORAMINAL STENOSIS OF LUMBAR SPINE: Primary | Chronic | ICD-10-CM

## 2024-01-08 PROCEDURE — 3074F SYST BP LT 130 MM HG: CPT | Performed by: FAMILY MEDICINE

## 2024-01-08 PROCEDURE — 99214 OFFICE O/P EST MOD 30 MIN: CPT | Performed by: FAMILY MEDICINE

## 2024-01-08 PROCEDURE — 3079F DIAST BP 80-89 MM HG: CPT | Performed by: FAMILY MEDICINE

## 2024-01-08 RX ORDER — IBUPROFEN 600 MG/1
600 TABLET ORAL 3 TIMES DAILY PRN
Qty: 270 TABLET | Refills: 1 | Status: SHIPPED | OUTPATIENT
Start: 2024-01-08

## 2024-01-08 RX ORDER — GABAPENTIN 300 MG/1
300 CAPSULE ORAL 3 TIMES DAILY
Qty: 90 CAPSULE | Refills: 5 | Status: SHIPPED | OUTPATIENT
Start: 2024-01-08 | End: 2025-01-07

## 2024-01-08 ASSESSMENT — ENCOUNTER SYMPTOMS
DIARRHEA: 0
BACK PAIN: 1
SHORTNESS OF BREATH: 0
VOMITING: 0
NAUSEA: 0
CONSTIPATION: 0
COUGH: 0

## 2024-01-08 ASSESSMENT — PATIENT HEALTH QUESTIONNAIRE - PHQ9
SUM OF ALL RESPONSES TO PHQ QUESTIONS 1-9: 0
1. LITTLE INTEREST OR PLEASURE IN DOING THINGS: 0
8. MOVING OR SPEAKING SO SLOWLY THAT OTHER PEOPLE COULD HAVE NOTICED. OR THE OPPOSITE, BEING SO FIGETY OR RESTLESS THAT YOU HAVE BEEN MOVING AROUND A LOT MORE THAN USUAL: 0
SUM OF ALL RESPONSES TO PHQ QUESTIONS 1-9: 0
10. IF YOU CHECKED OFF ANY PROBLEMS, HOW DIFFICULT HAVE THESE PROBLEMS MADE IT FOR YOU TO DO YOUR WORK, TAKE CARE OF THINGS AT HOME, OR GET ALONG WITH OTHER PEOPLE: 0
4. FEELING TIRED OR HAVING LITTLE ENERGY: 0
7. TROUBLE CONCENTRATING ON THINGS, SUCH AS READING THE NEWSPAPER OR WATCHING TELEVISION: 0
5. POOR APPETITE OR OVEREATING: 0
3. TROUBLE FALLING OR STAYING ASLEEP: 0
9. THOUGHTS THAT YOU WOULD BE BETTER OFF DEAD, OR OF HURTING YOURSELF: 0
SUM OF ALL RESPONSES TO PHQ9 QUESTIONS 1 & 2: 0
2. FEELING DOWN, DEPRESSED OR HOPELESS: 0
6. FEELING BAD ABOUT YOURSELF - OR THAT YOU ARE A FAILURE OR HAVE LET YOURSELF OR YOUR FAMILY DOWN: 0
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
## 2024-07-23 NOTE — TELEPHONE ENCOUNTER
----- Message from Loren Kaiser sent at 7/23/2024  9:08 AM CDT -----  Contact: Sara spouse  Type:  RX Refill Request    Who Called:  Sara spouse  Refill or New Rx:  refill  RX Name and Strength:  flash glucose sensor (FREESTYLE RAMA 2 SENSOR)   How is the patient currently taking it? (ex. 1XDay):  as directed  Is this a 30 day or 90 day RX:  90  Preferred Pharmacy with phone number:    [facility] - [place], [place] - [address] [address] [facility] [address]  Phone: [phone] Fax: [phone]      Local or Mail Order:  local  Ordering Provider:  Dr. Lillian Ni Call Back Number:  [phone] (home)     Additional Information:  Please call spouse to advise.  Thanks!   Discussed results. Please give copy of MRI results to patient. Please refer to Dr Barbara Becerril for [facility]. At this time, I do not believe spinal surgery is indicated. She may benefit other therapeutic options such as epidural steroid injection, facet injection or other interventional procedures. For this reason, I am going to refer to Dr. Carter Tavarez for Interventional Physiatry Consultation for an evaluation and treatment. Mar Kevin

## 2025-05-08 ENCOUNTER — OFFICE VISIT (OUTPATIENT)
Dept: PRIMARY CARE CLINIC | Age: 65
End: 2025-05-08

## 2025-05-08 VITALS
HEIGHT: 66 IN | WEIGHT: 117 LBS | SYSTOLIC BLOOD PRESSURE: 140 MMHG | DIASTOLIC BLOOD PRESSURE: 88 MMHG | TEMPERATURE: 97.8 F | HEART RATE: 70 BPM | BODY MASS INDEX: 18.8 KG/M2 | OXYGEN SATURATION: 100 %

## 2025-05-08 DIAGNOSIS — Z12.11 SCREENING FOR COLON CANCER: ICD-10-CM

## 2025-05-08 DIAGNOSIS — B35.1 ONYCHOMYCOSIS: ICD-10-CM

## 2025-05-08 DIAGNOSIS — K21.9 GASTROESOPHAGEAL REFLUX DISEASE, UNSPECIFIED WHETHER ESOPHAGITIS PRESENT: ICD-10-CM

## 2025-05-08 DIAGNOSIS — I10 ESSENTIAL HYPERTENSION: ICD-10-CM

## 2025-05-08 DIAGNOSIS — Z12.31 SCREENING MAMMOGRAM FOR BREAST CANCER: ICD-10-CM

## 2025-05-08 DIAGNOSIS — F32.2 CURRENT SEVERE EPISODE OF MAJOR DEPRESSIVE DISORDER WITHOUT PSYCHOTIC FEATURES, UNSPECIFIED WHETHER RECURRENT (HCC): Primary | ICD-10-CM

## 2025-05-08 DIAGNOSIS — Z13.220 SCREENING FOR LIPID DISORDERS: ICD-10-CM

## 2025-05-08 LAB
ALBUMIN SERPL-MCNC: 4.1 G/DL (ref 3.4–5)
ALBUMIN/GLOB SERPL: 1.4 {RATIO} (ref 1.1–2.2)
ALP SERPL-CCNC: 75 U/L (ref 40–129)
ALT SERPL-CCNC: 18 U/L (ref 10–40)
ANION GAP SERPL CALCULATED.3IONS-SCNC: 8 MMOL/L (ref 3–16)
AST SERPL-CCNC: 20 U/L (ref 15–37)
BILIRUB DIRECT SERPL-MCNC: 0.2 MG/DL (ref 0–0.3)
BILIRUB INDIRECT SERPL-MCNC: 0.2 MG/DL (ref 0–1)
BILIRUB SERPL-MCNC: 0.4 MG/DL (ref 0–1)
BUN SERPL-MCNC: 15 MG/DL (ref 7–20)
CALCIUM SERPL-MCNC: 9.2 MG/DL (ref 8.3–10.6)
CHLORIDE SERPL-SCNC: 105 MMOL/L (ref 99–110)
CHOLEST SERPL-MCNC: 157 MG/DL (ref 0–199)
CO2 SERPL-SCNC: 28 MMOL/L (ref 21–32)
CREAT SERPL-MCNC: 1 MG/DL (ref 0.6–1.2)
GFR SERPLBLD CREATININE-BSD FMLA CKD-EPI: 63 ML/MIN/{1.73_M2}
GLUCOSE SERPL-MCNC: 95 MG/DL (ref 70–99)
HDLC SERPL-MCNC: 67 MG/DL (ref 40–60)
LDLC SERPL CALC-MCNC: 80 MG/DL
POTASSIUM SERPL-SCNC: 4.1 MMOL/L (ref 3.5–5.1)
PROT SERPL-MCNC: 7 G/DL (ref 6.4–8.2)
SODIUM SERPL-SCNC: 141 MMOL/L (ref 136–145)
TRIGL SERPL-MCNC: 50 MG/DL (ref 0–150)
VLDLC SERPL CALC-MCNC: 10 MG/DL

## 2025-05-08 RX ORDER — OLMESARTAN MEDOXOMIL 20 MG/1
20 TABLET ORAL DAILY
Qty: 90 TABLET | Refills: 1 | Status: SHIPPED | OUTPATIENT
Start: 2025-05-08

## 2025-05-08 RX ORDER — DEXLANSOPRAZOLE 60 MG/1
CAPSULE, DELAYED RELEASE ORAL
Qty: 90 CAPSULE | Refills: 1 | Status: SHIPPED | OUTPATIENT
Start: 2025-05-08

## 2025-05-08 SDOH — ECONOMIC STABILITY: FOOD INSECURITY: WITHIN THE PAST 12 MONTHS, YOU WORRIED THAT YOUR FOOD WOULD RUN OUT BEFORE YOU GOT MONEY TO BUY MORE.: NEVER TRUE

## 2025-05-08 SDOH — ECONOMIC STABILITY: FOOD INSECURITY: WITHIN THE PAST 12 MONTHS, THE FOOD YOU BOUGHT JUST DIDN'T LAST AND YOU DIDN'T HAVE MONEY TO GET MORE.: NEVER TRUE

## 2025-05-08 ASSESSMENT — PATIENT HEALTH QUESTIONNAIRE - PHQ9
4. FEELING TIRED OR HAVING LITTLE ENERGY: NOT AT ALL
10. IF YOU CHECKED OFF ANY PROBLEMS, HOW DIFFICULT HAVE THESE PROBLEMS MADE IT FOR YOU TO DO YOUR WORK, TAKE CARE OF THINGS AT HOME, OR GET ALONG WITH OTHER PEOPLE: NOT DIFFICULT AT ALL
2. FEELING DOWN, DEPRESSED OR HOPELESS: NOT AT ALL
5. POOR APPETITE OR OVEREATING: NOT AT ALL
8. MOVING OR SPEAKING SO SLOWLY THAT OTHER PEOPLE COULD HAVE NOTICED. OR THE OPPOSITE, BEING SO FIGETY OR RESTLESS THAT YOU HAVE BEEN MOVING AROUND A LOT MORE THAN USUAL: NOT AT ALL
7. TROUBLE CONCENTRATING ON THINGS, SUCH AS READING THE NEWSPAPER OR WATCHING TELEVISION: NOT AT ALL
SUM OF ALL RESPONSES TO PHQ QUESTIONS 1-9: 0
3. TROUBLE FALLING OR STAYING ASLEEP: NOT AT ALL
1. LITTLE INTEREST OR PLEASURE IN DOING THINGS: NOT AT ALL
9. THOUGHTS THAT YOU WOULD BE BETTER OFF DEAD, OR OF HURTING YOURSELF: NOT AT ALL
6. FEELING BAD ABOUT YOURSELF - OR THAT YOU ARE A FAILURE OR HAVE LET YOURSELF OR YOUR FAMILY DOWN: NOT AT ALL
SUM OF ALL RESPONSES TO PHQ QUESTIONS 1-9: 0

## 2025-05-08 ASSESSMENT — ENCOUNTER SYMPTOMS
SHORTNESS OF BREATH: 0
DIARRHEA: 0
NAUSEA: 0
COUGH: 0
VOMITING: 0
BACK PAIN: 1
CONSTIPATION: 0

## 2025-05-08 NOTE — PROGRESS NOTES
Jackie Farfan (:  1960) is a 65 y.o. female,Established patient, here for evaluation of the following chief complaint(s):  Dizziness (Pt states sometimes when walking she gets dizzy and feels like she's going to fall or when she stands up ), Lower Back Pain (Pt states pain still comes and goes ), and Other (Handicap plac renewal)    Losartan??    SUBJECTIVE:  5.8.25:    Needs handicap placard  Still having back pain - steroid shots not very effective  Dizziness when walking? Faint concern, standing up quickly.     Subjective:  - Jackie Farfan presents with dizziness when standing up, feeling like she might fall.  or son has to grab her to prevent falling.  - Reports feeling depressed related to the loss of her daughter. Has been crying and feeling down.  - Reports acid reflux symptoms, particularly triggered by red sauce, tomatoes, and wheat.    Past Medical History:  - Previously on olmesartan/hydrochlorothiazide for hypertension but has not been taking it recently.  - History of GERD, previously treated with Dexilant (dexlansoprazole) which provided symptom relief.  - Has three children - twins (15 months old, one boy and one girl) and an older child who will be 3 years old at the end of the month.    Objective:  - Blood pressure appears to be stable without medication currently.    Assessment & Plan:  1. Orthostatic Hypotension  - Assessment: Experiencing dizziness when changing positions, likely due to blood pressure adjustments  - Treatment planned: Education provided regarding orthostatic hypotension and taking time when changing positions    2. Hypertension  - Assessment: Currently stable without medication  - Treatment planned: Switching from olmesartan/hydrochlorothiazide 40/12.5mg to olmesartan 20mg daily for kidney protection. Prescription sent to [facility] on [address].    3. Grief/Depression  - Assessment: Ongoing grief related to daughter's death  - Treatment planned:

## 2025-05-09 ENCOUNTER — RESULTS FOLLOW-UP (OUTPATIENT)
Dept: PRIMARY CARE CLINIC | Age: 65
End: 2025-05-09

## 2025-05-09 ENCOUNTER — TELEPHONE (OUTPATIENT)
Dept: ADMINISTRATIVE | Age: 65
End: 2025-05-09

## 2025-05-09 NOTE — TELEPHONE ENCOUNTER
Submitted PA for Dexlansoprazole 60MG dr capsules   Via [facility]  (Key: [id number]) STATUS: PENDING.    Follow up done daily; if no decision with in three days we will refax.  If another three days goes by with no decision will call the insurance for status.

## 2025-05-09 NOTE — TELEPHONE ENCOUNTER
The medication Dexlansoprazole 60MG dr capsules  is APPROVED from 05/09/25 to 05/09/26.    Please notify the patient.    If this requires a response please respond to the pool ( P [facility]X [facility] MEDICATION PRE-AUTH).

## 2025-05-21 ENCOUNTER — TELEPHONE (OUTPATIENT)
Dept: PRIMARY CARE CLINIC | Age: 65
End: 2025-05-21

## 2025-05-22 NOTE — TELEPHONE ENCOUNTER
Attempted to reach pt regarding form unsure if it was [employer] form if so new form needs to be refax verify  on form it states [birth year] in chart [birth year]

## 2025-06-05 ENCOUNTER — OFFICE VISIT (OUTPATIENT)
Dept: PRIMARY CARE CLINIC | Age: 65
End: 2025-06-05

## 2025-06-05 VITALS
DIASTOLIC BLOOD PRESSURE: 84 MMHG | SYSTOLIC BLOOD PRESSURE: 132 MMHG | TEMPERATURE: 98 F | BODY MASS INDEX: 18.32 KG/M2 | WEIGHT: 114 LBS | HEIGHT: 66 IN | HEART RATE: 67 BPM | OXYGEN SATURATION: 99 %

## 2025-06-05 DIAGNOSIS — Z12.31 SCREENING MAMMOGRAM FOR BREAST CANCER: ICD-10-CM

## 2025-06-05 DIAGNOSIS — R63.4 UNINTENTIONAL WEIGHT LOSS: Primary | ICD-10-CM

## 2025-06-05 DIAGNOSIS — Z12.11 SCREENING FOR COLON CANCER: ICD-10-CM

## 2025-06-05 ASSESSMENT — PATIENT HEALTH QUESTIONNAIRE - PHQ9
SUM OF ALL RESPONSES TO PHQ QUESTIONS 1-9: 0
1. LITTLE INTEREST OR PLEASURE IN DOING THINGS: NOT AT ALL
SUM OF ALL RESPONSES TO PHQ QUESTIONS 1-9: 0
7. TROUBLE CONCENTRATING ON THINGS, SUCH AS READING THE NEWSPAPER OR WATCHING TELEVISION: NOT AT ALL
2. FEELING DOWN, DEPRESSED OR HOPELESS: NOT AT ALL
4. FEELING TIRED OR HAVING LITTLE ENERGY: NOT AT ALL
9. THOUGHTS THAT YOU WOULD BE BETTER OFF DEAD, OR OF HURTING YOURSELF: NOT AT ALL
SUM OF ALL RESPONSES TO PHQ QUESTIONS 1-9: 0
5. POOR APPETITE OR OVEREATING: NOT AT ALL
3. TROUBLE FALLING OR STAYING ASLEEP: NOT AT ALL
10. IF YOU CHECKED OFF ANY PROBLEMS, HOW DIFFICULT HAVE THESE PROBLEMS MADE IT FOR YOU TO DO YOUR WORK, TAKE CARE OF THINGS AT HOME, OR GET ALONG WITH OTHER PEOPLE: NOT DIFFICULT AT ALL
8. MOVING OR SPEAKING SO SLOWLY THAT OTHER PEOPLE COULD HAVE NOTICED. OR THE OPPOSITE, BEING SO FIGETY OR RESTLESS THAT YOU HAVE BEEN MOVING AROUND A LOT MORE THAN USUAL: NOT AT ALL
6. FEELING BAD ABOUT YOURSELF - OR THAT YOU ARE A FAILURE OR HAVE LET YOURSELF OR YOUR FAMILY DOWN: NOT AT ALL
SUM OF ALL RESPONSES TO PHQ QUESTIONS 1-9: 0

## 2025-06-05 ASSESSMENT — ENCOUNTER SYMPTOMS
VOMITING: 0
DIARRHEA: 0
CONSTIPATION: 0
COUGH: 0
NAUSEA: 0
BACK PAIN: 1
SHORTNESS OF BREATH: 0

## 2025-06-05 NOTE — PROGRESS NOTES
Jackie Farfan (:  1960) is a 65 y.o. female,Established patient, here for evaluation of the following chief complaint(s):  Hypertension      SUBJECTIVE:  6.5.25:    Needs protein shakes to help with protein intake and more calories    Subjective:  - Jackie presents with continued weight loss, reporting a decrease from 117 to 114 pounds. She states she is not intentionally trying to lose weight.  - She reports trying to eat baked chicken and vegetables but acknowledges not eating enough overall.  - She avoids fried foods, preferring to bake chicken and fish.  - She expresses hesitation about trying oatmeal despite recommendations for its cholesterol benefits.  - She mentions adding sugar to grits and other foods.    Past Medical History:  - Previous Cologard test was attempted but sample was not collected; patient reports the collection service never returned to  the sample.    Objective:  - Weight: 114 pounds (down from previous weight of 117 pounds)  - Previous laboratory studies showed normal liver function, blood glucose, cholesterol, kidney function, and total protein.    Assessment & Plan:  1. Unintentional weight loss  - Assessment: Unintentional weight loss requiring further evaluation  - Investigations planned: Order new Cologard test to screen for colon cancer; schedule mammogram to rule out breast pathology  - Treatment planned: Increase caloric intake through addition of protein shakes, higher calorie foods including ice cream, peanut butter, and whole grain bread products; recommended adding fruit, cinnamon, honey to oatmeal for palatability           5.8.25:    Needs handicap placard  Still having back pain - steroid shots not very effective  Dizziness when walking? Faint concern, standing up quickly.     Subjective:  - Jackie Fafran presents with dizziness when standing up, feeling like she might fall.  or son has to grab her to prevent falling.  - Reports feeling

## 2025-06-06 PROBLEM — R63.4 UNINTENTIONAL WEIGHT LOSS: Status: ACTIVE | Noted: 2025-06-06

## (undated) DEVICE — SYRINGE MED 10ML LUERLOCK TIP W/O SFTY DISP

## (undated) DEVICE — MEDIA CONTRAST RX ISOVUE-300 61% 30ML VIALS

## (undated) DEVICE — Device: Brand: JELCO

## (undated) DEVICE — PORT VLV 2 W NDL FREE SMRTSITE

## (undated) DEVICE — STERILE POLYISOPRENE POWDER-FREE SURGICAL GLOVES: Brand: PROTEXIS

## (undated) DEVICE — GLOVE ORANGE PI 8   MSG9080

## (undated) DEVICE — CHLORAPREP 26ML ORANGE

## (undated) DEVICE — STANDARD HYPODERMIC NEEDLE,POLYPROPYLENE HUB: Brand: MONOJECT

## (undated) DEVICE — NEEDLE SPNL 22GA L3.5IN BLK HUB S STL REG WALL FIT STYL W/